# Patient Record
Sex: MALE | Race: BLACK OR AFRICAN AMERICAN | NOT HISPANIC OR LATINO | Employment: UNEMPLOYED
[De-identification: names, ages, dates, MRNs, and addresses within clinical notes are randomized per-mention and may not be internally consistent; named-entity substitution may affect disease eponyms.]

---
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
## 2023-10-17 ENCOUNTER — TELEPHONE (OUTPATIENT)
Dept: HEMATOLOGY/ONCOLOGY | Facility: HOSPITAL | Age: 23
End: 2023-10-17
Payer: MEDICAID

## 2023-10-17 NOTE — TELEPHONE ENCOUNTER
Message sent to team.   Contacted patient regarding breathing machine and that we are looking into a network that is able to provide a new one. Patient verbalized understanding. Provided call back number.

## 2023-10-17 NOTE — TELEPHONE ENCOUNTER
Patient insurance states that  is not in their network, they are attempting to come  his breathing machine. Patient would like to know what he should do.

## 2023-10-20 PROBLEM — H43.10 VITREOUS HEMORRHAGE (MULTI): Status: ACTIVE | Noted: 2023-10-20

## 2023-10-20 PROBLEM — R41.89 COGNITIVE IMPAIRMENT: Status: ACTIVE | Noted: 2023-10-20

## 2023-10-20 PROBLEM — R70.1 RETICULOCYTOSIS: Status: ACTIVE | Noted: 2023-10-20

## 2023-10-20 PROBLEM — L03.115 CELLULITIS OF RIGHT ANTERIOR LOWER LEG: Status: ACTIVE | Noted: 2022-08-15

## 2023-10-20 PROBLEM — R80.9 ALBUMINURIA: Status: ACTIVE | Noted: 2023-10-20

## 2023-10-20 PROBLEM — R48.8 DYSCALCULIA: Status: ACTIVE | Noted: 2023-10-20

## 2023-10-20 PROBLEM — H52.203 ASTIGMATISM OF BOTH EYES: Status: ACTIVE | Noted: 2023-10-20

## 2023-10-20 PROBLEM — K80.20 CALCULUS OF GALLBLADDER: Status: ACTIVE | Noted: 2021-05-26

## 2023-10-20 PROBLEM — R17 JAUNDICE: Status: ACTIVE | Noted: 2023-10-20

## 2023-10-20 PROBLEM — J98.4 SMALL AIRWAYS DISEASE: Status: ACTIVE | Noted: 2023-10-20

## 2023-10-20 PROBLEM — R06.02 SHORTNESS OF BREATH ON EXERTION: Status: ACTIVE | Noted: 2023-10-20

## 2023-10-20 PROBLEM — R40.0 DAYTIME SLEEPINESS: Status: ACTIVE | Noted: 2023-10-20

## 2023-10-20 PROBLEM — R29.818 SUSPECTED SLEEP APNEA: Status: ACTIVE | Noted: 2023-10-20

## 2023-10-20 PROBLEM — E83.19 IRON OVERLOAD: Status: ACTIVE | Noted: 2023-10-20

## 2023-10-20 PROBLEM — H52.13 MYOPIA, BILATERAL: Status: ACTIVE | Noted: 2023-10-20

## 2023-10-20 PROBLEM — E86.0 MODERATE DEHYDRATION: Status: ACTIVE | Noted: 2023-10-20

## 2023-10-20 PROBLEM — H36.89 SICKLE CELL RETINOPATHY (MULTI): Status: ACTIVE | Noted: 2023-10-20

## 2023-10-20 PROBLEM — R09.02 HYPOXIA: Status: ACTIVE | Noted: 2018-04-04

## 2023-10-20 PROBLEM — K80.50 BILIARY COLIC: Status: ACTIVE | Noted: 2021-05-26

## 2023-10-20 PROBLEM — S05.01XA ABRASION OF CORNEA, RIGHT: Status: ACTIVE | Noted: 2023-10-20

## 2023-10-20 PROBLEM — L03.119 CELLULITIS, LEG: Status: ACTIVE | Noted: 2023-10-20

## 2023-10-20 PROBLEM — D57.1 SICKLE CELL DISEASE (MULTI): Status: ACTIVE | Noted: 2021-05-26

## 2023-10-20 PROBLEM — D57.1 SICKLE CELL RETINOPATHY (MULTI): Status: ACTIVE | Noted: 2023-10-20

## 2023-10-20 RX ORDER — CALCIUM CARBONATE 200(500)MG
1 TABLET,CHEWABLE ORAL AS NEEDED
COMMUNITY

## 2023-10-20 RX ORDER — ALBUTEROL SULFATE 90 UG/1
1-2 AEROSOL, METERED RESPIRATORY (INHALATION)
COMMUNITY
Start: 2022-06-20 | End: 2023-10-23 | Stop reason: SDUPTHER

## 2023-10-20 RX ORDER — ERGOCALCIFEROL 1.25 MG/1
CAPSULE ORAL
COMMUNITY
Start: 2023-05-02 | End: 2023-12-27 | Stop reason: WASHOUT

## 2023-10-20 RX ORDER — OXYCODONE HYDROCHLORIDE 5 MG/1
1 TABLET ORAL EVERY 6 HOURS PRN
COMMUNITY
End: 2023-10-23 | Stop reason: ALTCHOICE

## 2023-10-20 RX ORDER — ACETAMINOPHEN 500 MG
1-2 TABLET ORAL DAILY PRN
COMMUNITY
Start: 2020-09-16

## 2023-10-20 RX ORDER — ACETAMINOPHEN 500 MG
TABLET ORAL
COMMUNITY
Start: 2022-11-10 | End: 2023-12-27 | Stop reason: WASHOUT

## 2023-10-20 RX ORDER — NAPROXEN 500 MG/1
TABLET ORAL
COMMUNITY

## 2023-10-23 ENCOUNTER — OFFICE VISIT (OUTPATIENT)
Dept: HEMATOLOGY/ONCOLOGY | Facility: HOSPITAL | Age: 23
End: 2023-10-23
Payer: MEDICAID

## 2023-10-23 ENCOUNTER — PHARMACY VISIT (OUTPATIENT)
Dept: PHARMACY | Facility: CLINIC | Age: 23
End: 2023-10-23
Payer: MEDICAID

## 2023-10-23 VITALS
BODY MASS INDEX: 24.06 KG/M2 | WEIGHT: 177.8 LBS | OXYGEN SATURATION: 92 % | TEMPERATURE: 97.3 F | SYSTOLIC BLOOD PRESSURE: 131 MMHG | HEART RATE: 88 BPM | DIASTOLIC BLOOD PRESSURE: 70 MMHG | RESPIRATION RATE: 16 BRPM

## 2023-10-23 DIAGNOSIS — J45.909 ASTHMA, UNSPECIFIED ASTHMA SEVERITY, UNSPECIFIED WHETHER COMPLICATED, UNSPECIFIED WHETHER PERSISTENT (HHS-HCC): ICD-10-CM

## 2023-10-23 DIAGNOSIS — D57.00 SICKLE CELL DISEASE WITH CRISIS (MULTI): Primary | ICD-10-CM

## 2023-10-23 PROCEDURE — 99214 OFFICE O/P EST MOD 30 MIN: CPT | Performed by: PEDIATRICS

## 2023-10-23 PROCEDURE — RXMED WILLOW AMBULATORY MEDICATION CHARGE

## 2023-10-23 RX ORDER — NALOXONE HYDROCHLORIDE 4 MG/.1ML
1 SPRAY NASAL AS NEEDED
Qty: 2 EACH | Refills: 1 | Status: SHIPPED | OUTPATIENT
Start: 2023-10-23 | End: 2024-10-22

## 2023-10-23 RX ORDER — FOLIC ACID 1 MG/1
TABLET ORAL
Qty: 90 TABLET | Refills: 2 | Status: SHIPPED | OUTPATIENT
Start: 2023-10-23 | End: 2024-01-24

## 2023-10-23 RX ORDER — AMOXICILLIN 250 MG/1
250 CAPSULE ORAL EVERY 12 HOURS
Qty: 60 CAPSULE | Refills: 11 | Status: SHIPPED | OUTPATIENT
Start: 2023-10-23 | End: 2024-03-19 | Stop reason: SDUPTHER

## 2023-10-23 RX ORDER — ALBUTEROL SULFATE 90 UG/1
1-2 AEROSOL, METERED RESPIRATORY (INHALATION) EVERY 6 HOURS PRN
Qty: 18 G | Refills: 1 | Status: SHIPPED | OUTPATIENT
Start: 2023-10-23

## 2023-10-23 RX ORDER — HYDROXYUREA 500 MG/1
2000 CAPSULE ORAL DAILY
Qty: 120 CAPSULE | Refills: 0 | Status: SHIPPED | OUTPATIENT
Start: 2023-10-23 | End: 2023-11-24 | Stop reason: SDUPTHER

## 2023-10-23 RX ORDER — FLUTICASONE PROPIONATE AND SALMETEROL 50; 250 UG/1; UG/1
1 POWDER RESPIRATORY (INHALATION) EVERY 12 HOURS
Qty: 60 EACH | Refills: 2 | Status: SHIPPED | OUTPATIENT
Start: 2023-10-23 | End: 2024-03-19 | Stop reason: SDUPTHER

## 2023-10-23 RX ORDER — OXYCODONE HYDROCHLORIDE 10 MG/1
10 TABLET ORAL EVERY 6 HOURS
Qty: 15 TABLET | Refills: 0 | Status: SHIPPED | OUTPATIENT
Start: 2023-10-23 | End: 2023-11-07

## 2023-10-23 ASSESSMENT — PAIN SCALES - GENERAL: PAINLEVEL: 0-NO PAIN

## 2023-10-24 ASSESSMENT — ENCOUNTER SYMPTOMS
ABDOMINAL PAIN: 0
DYSURIA: 0
WOUND: 0
NAUSEA: 0
FLANK PAIN: 0
CONFUSION: 0
ARTHRALGIAS: 0
CONSTIPATION: 0
HEMOPTYSIS: 0
BACK PAIN: 0
UNEXPECTED WEIGHT CHANGE: 0
LEG SWELLING: 0
EYE PROBLEMS: 0
FEVER: 0
SLEEP DISTURBANCE: 0
SORE THROAT: 0
NUMBNESS: 0
FATIGUE: 0
HEMATOLOGIC/LYMPHATIC NEGATIVE: 1
APPETITE CHANGE: 0
MYALGIAS: 0
DEPRESSION: 0
CHILLS: 0
SCLERAL ICTERUS: 0
FREQUENCY: 0
PALPITATIONS: 0
BLOOD IN STOOL: 0
CHEST TIGHTNESS: 0
DIARRHEA: 0
EXTREMITY WEAKNESS: 0
HEADACHES: 0
COUGH: 0
VOMITING: 0
LIGHT-HEADEDNESS: 0
NERVOUS/ANXIOUS: 0
WHEEZING: 0

## 2023-10-24 NOTE — PROGRESS NOTES
SICKLE CELL OUTPATIENT NOTE  Patient ID: Yakov Jimenez   Visit Type: Follow up visit       ASSESSMENT AND PLAN  Yakov Jimenez is 23 y.o. male with     History of Hb SS Sickle cell disease in steady state and without acute sickle cell complications including pain. Pain regimen will be as follows  Oral Tylenol and or ibuprofen/naprosyn as needed for mild to moderate pain   Oral oxycodone 10 mg every 4-6 hours prn and patient was given a new prescription   Discussed non pharmacologic methods of pain control   Reviewed OARRS  Narcan available at all times and reviewed indications    Encounter for management of disease modifying therapy. Yakov Jimenez is currently on Hydroxyurea and continues to tolerate this without any major side effects    Continue hydroxyurea 2000 mg daily   Follow up on CBC and CMP for toxicity monitoring     Chronic anemia secondary to SCD    Daily folic acid     Nocturnal hypoxia on supplemental night time oxygen. Current oxygen supplier is out of his insurance network. 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 He will  need a CBC and CMP         Chief Complaint: Follow up for HB SS SCD      Interval History: Yakov is present, in clinic today for follow-up of hemoglobin SS sickle cell disease.  His grandfather Anderson, was on the phone during the encounter. He has been doing well since his last office visit encounter, and is without any complaints in clinic today. He remains on oral hydroxyurea, 2 g daily and has been compliant with this. Prn tylenol ibuprofen and oxycodone does control his sickle cell pain. He denies  hospitalizations, ED visits or [facility] visits for acute sickle cell crises. He had his nocturnal oximetry testing done but current oxygen supplier is out of network for him. Reactive airway disease has been well controlled. He is without pain, fever, chest pain, chest tightness, shortness of breath, nausea, or vomiting in clinic today. He denies focal neurologic deficits or priapism. He has not had any changes or worsening of his vision.     Review of System:   Review of Systems   Constitutional:  Negative for appetite change, chills, fatigue, fever and unexpected weight change.   HENT:   Negative for nosebleeds and sore throat.    Eyes:  Negative for eye problems and icterus.   Respiratory:  Negative for chest tightness, cough, hemoptysis and wheezing.    Cardiovascular:  Negative for chest pain, leg swelling and palpitations.   Gastrointestinal:  Negative for abdominal pain, blood in stool, constipation, diarrhea, nausea and vomiting.   Genitourinary:  Negative for dysuria, frequency and nocturia.    Musculoskeletal:  Negative for arthralgias, back pain, flank pain, gait problem and myalgias.   Skin:  Negative for rash and wound.   Neurological:  Negative for extremity weakness, gait problem, headaches, light-headedness and numbness.   Hematological: Negative.    Psychiatric/Behavioral:  Negative for confusion, depression and sleep disturbance. The patient is not nervous/anxious.         Allergies:   Allergies   Allergen Reactions    Ceftriaxone Anaphylaxis       Current Medications:   Outpatient Encounter Medications as of 10/23/2023   Medication Sig    acetaminophen (Tylenol) 500 mg tablet Take 1-2 tablets (500-1,000 mg) by mouth once daily as needed.    naproxen (Naprosyn) 500 mg tablet Take by mouth.  May take one Naproxen with one tylenol for pain if tylenol alone doesn't work for mild to moderate sickle cell pain    Advair Diskus 250-50 mcg/dose diskus inhaler Inhale 1 puff every 12 hours.    albuterol 90  mcg/actuation inhaler Inhale 1-2 puffs  EVERY 4 TO 6 HOURS if needed for wheezing..    amoxicillin (Amoxil) 250 mg capsule TAKE 1 CAPSULE BY MOUTH EVERY 12 HOURS    calcium carbonate (Tums) 200 mg calcium chewable tablet Chew 1 tablet (500 mg) if needed.    ergocalciferol (Vitamin D-2) 1.25 MG (18404 UT) capsule     folic acid (Folvite) 1 mg tablet TAKE 1 TABLLET BY MOUTH ONCE A DAY    hydroxyurea (Hydrea) 500 mg capsule TAKE 4 CAPSULES BY MOUTH ONCE DAILY    melatonin 5 mg tablet     naloxone (Narcan) 4 mg/0.1 mL nasal spray INSTILL 1 SPRAY IN ONE NOSTRIL AS NEEDED FOR ACCIDENTAL OPIOID OVERDOSE; REPEAT WITH SECOND DOSE IN 5 MINUTES IF NO RESPONSE.    oxyCODONE (Roxicodone) 10 mg immediate release tablet TAKE 1 TABLET BY MOUTH EVERY 6 HOURS     Past Medical History:    Hb SS SCD    Vitreous hemorrhage right eye, March 2019. s/p laser and close f/u via optho. Last opthal   Chronic fatigue      Past Surgical History:   She has a past surgical history that includes Other surgical history (07/14/2022) and MR angio head wo IV contrast (7/26/2019). Lives with Grandparents and uncle. 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 Mother passed away from complications of SCD and her uncle also has  SCD        Family History:   Family History   Problem Relation Name Age of Onset    Asthma Mother         Social History:   Yakov Jimenez  reports that he has never smoked. He has never used smokeless tobacco.  has no history on file for drug use.    EXAMINATION FINDINGS   /70 (BP Location: Left arm, Patient Position: Sitting)   Pulse 88   Temp 36.3 °C (97.3 °F) (Core)   Resp 16   Wt 80.6 kg (177 lb 12.8 oz)   SpO2 92%   BMI 24.06 kg/m²   2.02 meters squared  Physical Exam  Vitals and nursing note reviewed.   Constitutional:       General: He is not in acute distress.     Appearance: Normal appearance. He is not ill-appearing.   HENT:      Nose: Nose normal.      Mouth/Throat:      Mouth: Mucous  membranes are moist.      Pharynx: Oropharynx is clear. No oropharyngeal exudate or posterior oropharyngeal erythema.   Eyes:      General: No scleral icterus.        Right eye: No discharge.         Left eye: No discharge.      Extraocular Movements: Extraocular movements intact.      Pupils: Pupils are equal, round, and reactive to light.   Cardiovascular:      Rate and Rhythm: Normal rate and regular rhythm.      Pulses: Normal pulses.      Heart sounds: Normal heart sounds, S1 normal and S2 normal. No murmur heard.  Pulmonary:      Effort: Pulmonary effort is normal. No respiratory distress.      Breath sounds: Normal breath sounds. No wheezing or rales.   Abdominal:      General: Abdomen is flat. There is no distension.      Palpations: Abdomen is soft. There is no hepatomegaly or splenomegaly.      Tenderness: There is no abdominal tenderness. There is no guarding.   Musculoskeletal:         General: No swelling or deformity. Normal range of motion.      Cervical back: Normal range of motion and neck supple.      Right lower leg: No edema.      Left lower leg: No edema.   Skin:     General: Skin is warm.      Capillary Refill: Capillary refill takes less than 2 seconds.      Coloration: Skin is not jaundiced.      Findings: No bruising or lesion.   Neurological:      General: No focal deficit present.      Mental Status: He is alert and oriented to person, place, and time.      Cranial Nerves: No cranial nerve deficit.      Motor: No weakness.      Gait: Gait normal.   Psychiatric:         Mood and Affect: Mood normal.         Behavior: Behavior normal.       LABS   [facility] Encounter on 08/21/2023   Component Date Value Ref Range Status    Hgb Identification Path Review 08/21/2023 JADA   Final    Comment:  By her/his signature above, the Pathologist   listed as making the final interpretation   certifies that she/he has personally reviewed    this case.  ----------------------------------------------        Glucose 08/21/2023 83  74 - 99 mg/dL Final    Sodium 08/21/2023 136  136 - 145 mmol/L Final    Potassium 08/21/2023 4.4  3.5 - 5.3 mmol/L Final    Chloride 08/21/2023 104  98 - 107 mmol/L Final    Bicarbonate 08/21/2023 27  21 - 32 mmol/L Final    Anion Gap 08/21/2023 9 (L)  10 - 20 mmol/L Final    Urea Nitrogen 08/21/2023 11  6 - 23 mg/dL Final    Creatinine 08/21/2023 0.69  0.50 - 1.30 mg/dL Final    GFR MALE 08/21/2023 >90  >90 mL/min/1.73m2 Final    Comment:  CALCULATIONS OF ESTIMATED GFR ARE PERFORMED   USING THE 2021 CKD-EPI STUDY REFIT EQUATION   WITHOUT THE RACE VARIABLE FOR THE IDMS-TRACEABLE   CREATININE METHODS.    https://jasn.asnjournals.org/content/early/2021/09/22/ASN.2564358829      Calcium 08/21/2023 9.4  8.6 - 10.3 mg/dL Final    Albumin 08/21/2023 4.3  3.4 - 5.0 g/dL Final    Alkaline Phosphatase 08/21/2023 98  33 - 120 U/L Final    Total Protein 08/21/2023 7.8  6.4 - 8.2 g/dL Final    AST 08/21/2023 31  9 - 39 U/L Final    Total Bilirubin 08/21/2023 2.9 (H)  0.0 - 1.2 mg/dL Final    ALT (SGPT) 08/21/2023 27  10 - 52 U/L Final    Comment:  Patients treated with Sulfasalazine may generate    falsely decreased results for ALT.      BNP 08/21/2023 22  0 - 99 pg/mL Final    Comment: .  <100 pg/mL - Heart failure unlikely  100-299 pg/mL - Intermediate probability of acute heart  .               failure exacerbation. Correlate with clinical  .               context and patient history.    >=300 pg/mL - Heart Failure likely. Correlate with clinical  .               context and patient history.   Biotin interference may cause falsely decreased results.   Patients taking a Biotin dose of up to 5 mg/day should   refrain from taking Biotin for 24 hours before sample   collection. Providers may contact their local laboratory   for further information.      Retic % 08/21/2023 11.5 (H)  0.5 - 2.0 % Final    Retic Absolute 08/21/2023 0.265 (H)  0.022 - 0.118 x10E12/L Final    Immature Retic fraction  08/21/2023 30.9 (H)  0.0 - 16.0 % Final    Reticulocyte Hemoglobin 08/21/2023 42 (H)  28 - 38 pg Final    LD 08/21/2023 341 (H)  84 - 246 U/L Final    WBC 08/21/2023 5.2  4.4 - 11.3 x10E9/L Final    nRBC 08/21/2023 41.5  0.0 - 0.0 /100 WBC Final    RBC 08/21/2023 2.30 (L)  4.50 - 5.90 x10E12/L Final    Hemoglobin 08/21/2023 10.1 (L)  13.5 - 17.5 g/dL Final    Hematocrit 08/21/2023 26.7 (L)  41.0 - 52.0 % Final    MCV 08/21/2023 116 (H)  80 - 100 fL Final    MCHC 08/21/2023 37.8 (H)  32.0 - 36.0 g/dL Final    Platelets 08/21/2023 310  150 - 450 x10E9/L Final    RDW 08/21/2023 24.8 (H)  11.5 - 14.5 % Final    Neutrophils % 08/21/2023 42.2  40.0 - 80.0 % Final    Immature Granulocytes %, Automated 08/21/2023 0.2  0.0 - 0.9 % Final    Comment:  Immature Granulocyte Count (IG) includes promyelocytes,    myelocytes and metamyelocytes but does not include bands.   Percent differential counts (%) should be interpreted in the   context of the absolute cell counts (cells/L).      Lymphocytes % 08/21/2023 47.7  13.0 - 44.0 % Final    Monocytes % 08/21/2023 6.0  2.0 - 10.0 % Final    Eosinophils % 08/21/2023 2.7  0.0 - 6.0 % Final    Basophils % 08/21/2023 1.2  0.0 - 2.0 % Final    Neutrophils Absolute 08/21/2023 2.20  1.20 - 7.70 x10E9/L Final    Lymphocytes Absolute 08/21/2023 2.48  1.20 - 4.80 x10E9/L Final    Monocytes Absolute 08/21/2023 0.31  0.10 - 1.00 x10E9/L Final    Eosinophils Absolute 08/21/2023 0.14  0.00 - 0.70 x10E9/L Final    Basophils Absolute 08/21/2023 0.06  0.00 - 0.10 x10E9/L Final    Automated WBC differential has been confirmed by manual smear.    Hemoglobin F 08/21/2023 7.7  % Final    Hemoglobin S 08/21/2023 88.1  % Final    Hemoglobin A2 08/21/2023 4.2  % Final     HGB A2 values may be falsely elevated  in the presence of HGB S.    Hemoglobin Identification Interpre* 08/21/2023 SEE COMMENT   Final    Comment: Known Sickle Cell Anemia (Hb SS)  Increased hemoglobin F with macrocytosis is most likely a  result of treatment   effect. Clinical correlation suggested.      ALBUMIN (MG/L) IN URINE 08/21/2023 12.7  Not Established mg/L Final    Albumin/Creatine Ratio 08/21/2023 15.0  0.0 - 30.0 ug/mg crt Final    Creatinine, Urine 08/21/2023 84.7  20.0 - 370.0 mg/dL Final    RBC Morphology 08/21/2023 See Below   Final    Polychromasia 08/21/2023 Mild   Final    RBC Fragments 08/21/2023 Few   Final    Sickle Cells 08/21/2023 Many   Final    Target Cells 08/21/2023 Few   Final    Pappenheimer Bodies 08/21/2023 Present   Final                 Jose Guadalupe Nelson MD

## 2023-11-02 ENCOUNTER — PHARMACY VISIT (OUTPATIENT)
Dept: PHARMACY | Facility: CLINIC | Age: 23
End: 2023-11-02
Payer: MEDICAID

## 2023-11-02 PROCEDURE — RXMED WILLOW AMBULATORY MEDICATION CHARGE

## 2023-11-09 ENCOUNTER — APPOINTMENT (OUTPATIENT)
Dept: SLEEP MEDICINE | Facility: CLINIC | Age: 23
End: 2023-11-09
Payer: MEDICAID

## 2023-11-17 ENCOUNTER — CLINICAL SUPPORT (OUTPATIENT)
Dept: SLEEP MEDICINE | Facility: CLINIC | Age: 23
End: 2023-11-17
Payer: MEDICAID

## 2023-11-17 VITALS
HEIGHT: 72 IN | SYSTOLIC BLOOD PRESSURE: 131 MMHG | DIASTOLIC BLOOD PRESSURE: 70 MMHG | WEIGHT: 177.69 LBS | BODY MASS INDEX: 24.07 KG/M2

## 2023-11-17 DIAGNOSIS — G47.9 SLEEP DISORDER, UNSPECIFIED: ICD-10-CM

## 2023-11-17 DIAGNOSIS — R40.0 DAYTIME SLEEPINESS: ICD-10-CM

## 2023-11-17 DIAGNOSIS — R29.818 SUSPECTED SLEEP APNEA: ICD-10-CM

## 2023-11-17 PROCEDURE — 95810 POLYSOM 6/> YRS 4/> PARAM: CPT | Performed by: INTERNAL MEDICINE

## 2023-11-18 ASSESSMENT — SLEEP AND FATIGUE QUESTIONNAIRES
HOW LIKELY ARE YOU TO NOD OFF OR FALL ASLEEP WHILE WATCHING TV: WOULD NEVER DOZE
SITING INACTIVE IN A PUBLIC PLACE LIKE A CLASS ROOM OR A MOVIE THEATER: WOULD NEVER DOZE
HOW LIKELY ARE YOU TO NOD OFF OR FALL ASLEEP WHILE SITTING AND TALKING TO SOMEONE: WOULD NEVER DOZE
HOW LIKELY ARE YOU TO NOD OFF OR FALL ASLEEP IN A CAR, WHILE STOPPED FOR A FEW MINUTES IN TRAFFIC: WOULD NEVER DOZE
HOW LIKELY ARE YOU TO NOD OFF OR FALL ASLEEP WHILE SITTING AND READING: WOULD NEVER DOZE
ESS-CHAD TOTAL SCORE: 5
HOW LIKELY ARE YOU TO NOD OFF OR FALL ASLEEP WHEN YOU ARE A PASSENGER IN A CAR FOR AN HOUR WITHOUT A BREAK: HIGH CHANCE OF DOZING
HOW LIKELY ARE YOU TO NOD OFF OR FALL ASLEEP WHILE LYING DOWN TO REST IN THE AFTERNOON WHEN CIRCUMSTANCES PERMIT: MODERATE CHANCE OF DOZING
HOW LIKELY ARE YOU TO NOD OFF OR FALL ASLEEP WHILE SITTING QUIETLY AFTER LUNCH WITHOUT ALCOHOL: WOULD NEVER DOZE

## 2023-11-18 NOTE — PROGRESS NOTES
[facility] TECH NOTE:     Patient: Yakov Jimenez   MRN//AGE: [id number]  2000  23 y.o.   Technologist: Joseph Olivas   Room: 3   Service Date: 2023        Sleep Testing Location: [facility]  Neck Ciur 37cm  Vail: 5    TECHNOLOGIST SLEEP STUDY PROCEDURE NOTE:   This sleep study is being conducted according to the policies and procedures outlined by the AAS accreditation standards.  The sleep study procedure and processes involved during this appointment was explained to the patient/patient’s family, questions were answered. The patient/family verbalized understanding.      The patient is a 23 y.o. year old male scheduled for aDiagnostic PSG Split night with montage of:  adult PSG with end tidal CO2 . he arrived for his appointment.      The study that was ultimately completed was aDiagnostic PSG Split night with montage of:  adult PSG with end tidal CO2 .    The full study Was completed.  Patient questionnaires completed?: yes     Consents signed? yes    Initial Fall Risk Screening:     Yakov has not fallen in the last 6 months. his did not result in injury. Yakov does not have a fear of falling. He does not need assistance with sitting, standing, or walking. he does not need assistance walking in his home. he does not need assistance in an unfamiliar setting. The patient is notusing an assistive device.     Brief Study observations: This is a 23 year-old male here for a PSG-Split Night Study     Deviation to order/protocol and reason: none      If PAP, which was preferred mask/pressure/mode: none      Other:None    After the procedure, the patient/family was informed to ensure followup with ordering clinician for testing results.      Technologist: Joseph Olivas

## 2023-11-24 ENCOUNTER — PHARMACY VISIT (OUTPATIENT)
Dept: PHARMACY | Facility: CLINIC | Age: 23
End: 2023-11-24
Payer: MEDICAID

## 2023-11-24 DIAGNOSIS — D57.00 SICKLE CELL DISEASE WITH CRISIS (MULTI): ICD-10-CM

## 2023-11-24 PROCEDURE — RXMED WILLOW AMBULATORY MEDICATION CHARGE

## 2023-11-27 RX ORDER — HYDROXYUREA 500 MG/1
2000 CAPSULE ORAL DAILY
Qty: 120 CAPSULE | Refills: 0 | Status: ON HOLD | OUTPATIENT
Start: 2023-11-27 | End: 2024-01-03

## 2023-11-28 ENCOUNTER — PHARMACY VISIT (OUTPATIENT)
Dept: PHARMACY | Facility: CLINIC | Age: 23
End: 2023-11-28
Payer: MEDICAID

## 2023-11-28 PROCEDURE — RXMED WILLOW AMBULATORY MEDICATION CHARGE

## 2023-11-30 ENCOUNTER — TELEPHONE (OUTPATIENT)
Dept: HEMATOLOGY/ONCOLOGY | Facility: HOSPITAL | Age: 23
End: 2023-11-30
Payer: MEDICAID

## 2023-11-30 ENCOUNTER — HOSPITAL ENCOUNTER (EMERGENCY)
Facility: HOSPITAL | Age: 23
Discharge: HOME | End: 2023-11-30
Attending: EMERGENCY MEDICINE
Payer: MEDICAID

## 2023-11-30 VITALS
DIASTOLIC BLOOD PRESSURE: 73 MMHG | TEMPERATURE: 98.5 F | OXYGEN SATURATION: 100 % | HEART RATE: 78 BPM | RESPIRATION RATE: 16 BRPM | SYSTOLIC BLOOD PRESSURE: 118 MMHG

## 2023-11-30 DIAGNOSIS — R42 DIZZINESS: Primary | ICD-10-CM

## 2023-11-30 LAB
ALBUMIN SERPL BCP-MCNC: 4.4 G/DL (ref 3.4–5)
ALP SERPL-CCNC: 87 U/L (ref 33–120)
ALT SERPL W P-5'-P-CCNC: 23 U/L (ref 10–52)
ANION GAP SERPL CALC-SCNC: 11 MMOL/L (ref 10–20)
AST SERPL W P-5'-P-CCNC: 24 U/L (ref 9–39)
BASOPHILS # BLD AUTO: 0.05 X10*3/UL (ref 0–0.1)
BASOPHILS NFR BLD AUTO: 0.9 %
BILIRUB SERPL-MCNC: 2.4 MG/DL (ref 0–1.2)
BUN SERPL-MCNC: 8 MG/DL (ref 6–23)
CALCIUM SERPL-MCNC: 9.1 MG/DL (ref 8.6–10.6)
CHLORIDE SERPL-SCNC: 104 MMOL/L (ref 98–107)
CO2 SERPL-SCNC: 27 MMOL/L (ref 21–32)
CREAT SERPL-MCNC: 0.69 MG/DL (ref 0.5–1.3)
EOSINOPHIL # BLD AUTO: 0.1 X10*3/UL (ref 0–0.7)
EOSINOPHIL NFR BLD AUTO: 1.8 %
ERYTHROCYTE [DISTWIDTH] IN BLOOD BY AUTOMATED COUNT: 19.1 % (ref 11.5–14.5)
GFR SERPL CREATININE-BSD FRML MDRD: >90 ML/MIN/1.73M*2
GLUCOSE SERPL-MCNC: 86 MG/DL (ref 74–99)
HCT VFR BLD AUTO: 26.1 % (ref 41–52)
HGB BLD-MCNC: 9.4 G/DL (ref 13.5–17.5)
HGB RETIC QN: 41 PG (ref 28–38)
IMM GRANULOCYTES # BLD AUTO: 0.02 X10*3/UL (ref 0–0.7)
IMM GRANULOCYTES NFR BLD AUTO: 0.4 % (ref 0–0.9)
IMMATURE RETIC FRACTION: 34.1 %
LDH SERPL L TO P-CCNC: 260 U/L (ref 84–246)
LYMPHOCYTES # BLD AUTO: 3.54 X10*3/UL (ref 1.2–4.8)
LYMPHOCYTES NFR BLD AUTO: 62.4 %
MCH RBC QN AUTO: 42.9 PG (ref 26–34)
MCHC RBC AUTO-ENTMCNC: 36 G/DL (ref 32–36)
MCV RBC AUTO: 119 FL (ref 80–100)
MONOCYTES # BLD AUTO: 0.43 X10*3/UL (ref 0.1–1)
MONOCYTES NFR BLD AUTO: 7.6 %
NEUTROPHILS # BLD AUTO: 1.53 X10*3/UL (ref 1.2–7.7)
NEUTROPHILS NFR BLD AUTO: 26.9 %
NRBC BLD-RTO: 21.3 /100 WBCS (ref 0–0)
PLATELET # BLD AUTO: 239 X10*3/UL (ref 150–450)
POLYCHROMASIA BLD QL SMEAR: NORMAL
POTASSIUM SERPL-SCNC: 4.2 MMOL/L (ref 3.5–5.3)
PROT SERPL-MCNC: 7.2 G/DL (ref 6.4–8.2)
RBC # BLD AUTO: 2.19 X10*6/UL (ref 4.5–5.9)
RBC MORPH BLD: NORMAL
RETICS #: 0.25 X10*6/UL (ref 0.02–0.12)
RETICS/RBC NFR AUTO: 11.2 % (ref 0.5–2)
SICKLE CELLS BLD QL SMEAR: NORMAL
SODIUM SERPL-SCNC: 138 MMOL/L (ref 136–145)
STOMATOCYTES BLD QL SMEAR: NORMAL
TARGETS BLD QL SMEAR: NORMAL
WBC # BLD AUTO: 5.7 X10*3/UL (ref 4.4–11.3)

## 2023-11-30 PROCEDURE — 2500000004 HC RX 250 GENERAL PHARMACY W/ HCPCS (ALT 636 FOR OP/ED): Mod: SE | Performed by: STUDENT IN AN ORGANIZED HEALTH CARE EDUCATION/TRAINING PROGRAM

## 2023-11-30 PROCEDURE — 93010 ELECTROCARDIOGRAM REPORT: CPT | Performed by: EMERGENCY MEDICINE

## 2023-11-30 PROCEDURE — 80053 COMPREHEN METABOLIC PANEL: CPT | Performed by: EMERGENCY MEDICINE

## 2023-11-30 PROCEDURE — 36415 COLL VENOUS BLD VENIPUNCTURE: CPT | Performed by: EMERGENCY MEDICINE

## 2023-11-30 PROCEDURE — 85025 COMPLETE CBC W/AUTO DIFF WBC: CPT | Performed by: EMERGENCY MEDICINE

## 2023-11-30 PROCEDURE — 99283 EMERGENCY DEPT VISIT LOW MDM: CPT | Mod: 25

## 2023-11-30 PROCEDURE — 99284 EMERGENCY DEPT VISIT MOD MDM: CPT | Performed by: EMERGENCY MEDICINE

## 2023-11-30 PROCEDURE — 93005 ELECTROCARDIOGRAM TRACING: CPT

## 2023-11-30 PROCEDURE — 83615 LACTATE (LD) (LDH) ENZYME: CPT | Performed by: EMERGENCY MEDICINE

## 2023-11-30 PROCEDURE — 96361 HYDRATE IV INFUSION ADD-ON: CPT

## 2023-11-30 PROCEDURE — 85045 AUTOMATED RETICULOCYTE COUNT: CPT | Performed by: EMERGENCY MEDICINE

## 2023-11-30 PROCEDURE — 96360 HYDRATION IV INFUSION INIT: CPT

## 2023-11-30 PROCEDURE — 2500000001 HC RX 250 WO HCPCS SELF ADMINISTERED DRUGS (ALT 637 FOR MEDICARE OP): Mod: SE | Performed by: STUDENT IN AN ORGANIZED HEALTH CARE EDUCATION/TRAINING PROGRAM

## 2023-11-30 RX ORDER — MECLIZINE HCL 12.5 MG 12.5 MG/1
25 TABLET ORAL ONCE
Status: COMPLETED | OUTPATIENT
Start: 2023-11-30 | End: 2023-11-30

## 2023-11-30 RX ORDER — MECLIZINE HYDROCHLORIDE 25 MG/1
25 TABLET ORAL 3 TIMES DAILY PRN
Qty: 10 TABLET | Refills: 0 | Status: SHIPPED | OUTPATIENT
Start: 2023-11-30 | End: 2024-01-03 | Stop reason: HOSPADM

## 2023-11-30 RX ORDER — HYDROGEN PEROXIDE 3 %
SOLUTION, NON-ORAL MISCELLANEOUS
Status: DISCONTINUED
Start: 2023-11-30 | End: 2023-11-30 | Stop reason: HOSPADM

## 2023-11-30 RX ADMIN — MECLIZINE 25 MG: 12.5 TABLET ORAL at 19:30

## 2023-11-30 RX ADMIN — SODIUM CHLORIDE, POTASSIUM CHLORIDE, SODIUM LACTATE AND CALCIUM CHLORIDE 1000 ML: 600; 310; 30; 20 INJECTION, SOLUTION INTRAVENOUS at 19:30

## 2023-11-30 ASSESSMENT — COLUMBIA-SUICIDE SEVERITY RATING SCALE - C-SSRS
2. HAVE YOU ACTUALLY HAD ANY THOUGHTS OF KILLING YOURSELF?: NO
1. IN THE PAST MONTH, HAVE YOU WISHED YOU WERE DEAD OR WISHED YOU COULD GO TO SLEEP AND NOT WAKE UP?: NO
6. HAVE YOU EVER DONE ANYTHING, STARTED TO DO ANYTHING, OR PREPARED TO DO ANYTHING TO END YOUR LIFE?: NO

## 2023-11-30 NOTE — ED PROVIDER NOTES
HPI   Chief Complaint   Patient presents with    Dizziness       Patient is a 23-year-old male with past medical history of sickle cell disease here with 1 day of positional vertigo.  Denies fevers, chills, chest pain, shortness of breath, vision changes, neck pain or stiffness, headaches, focal weakness, numbness, paresthesias.  Denies any symptoms ever had before, denies preceding URI, denies tinnitus or hearing changes.  Has not taken any medication for symptoms.  Denies any vomiting.       used: No                        No data recorded                Patient History   No past medical history on file.  Past Surgical History:   Procedure Laterality Date    MR HEAD ANGIO WO IV CONTRAST  7/26/2019    MR HEAD ANGIO WO IV CONTRAST 7/26/2019 [facility] ANCILLARY LEGACY    OTHER SURGICAL HISTORY  07/14/2022    Eye surgery     Family History   Problem Relation Name Age of Onset    Asthma Mother       Social History     Tobacco Use    Smoking status: Never    Smokeless tobacco: Never   Substance Use Topics    Alcohol use: Not on file    Drug use: Not on file       Physical Exam   ED Triage Vitals [11/30/23 1453]   Temp Heart Rate Resp BP   36.9 °C (98.4 °F) 83 16 123/61      SpO2 Temp Source Heart Rate Source Patient Position   98 % Temporal -- --      BP Location FiO2 (%)     -- --       Physical Exam  Constitutional:       Appearance: Normal appearance.   HENT:      Head: Normocephalic and atraumatic.      Right Ear: External ear normal.      Left Ear: External ear normal.      Nose: No congestion or rhinorrhea.      Mouth/Throat:      Mouth: Mucous membranes are moist.      Pharynx: Oropharynx is clear.   Eyes:      Extraocular Movements: Extraocular movements intact.      Conjunctiva/sclera: Conjunctivae normal.      Pupils: Pupils are equal, round, and reactive to light.      Comments: No nystagmus   Cardiovascular:      Rate and Rhythm: Normal rate and regular rhythm.      Pulses: Normal pulses.       Heart sounds: Normal heart sounds.   Pulmonary:      Effort: Pulmonary effort is normal.      Breath sounds: Normal breath sounds.   Abdominal:      Palpations: Abdomen is soft.      Tenderness: There is no abdominal tenderness.   Musculoskeletal:         General: Normal range of motion.      Cervical back: Normal range of motion and neck supple. No rigidity.   Skin:     General: Skin is warm and dry.      Capillary Refill: Capillary refill takes less than 2 seconds.   Neurological:      General: No focal deficit present.      Mental Status: He is alert and oriented to person, place, and time. Mental status is at baseline.      Comments: 5 out of 5 strength bilateral upper and lower extremities, sensation intact, negative Romberg, gait intact without ataxia, negative finger-nose and heel-to-shin, negative dysdiadochokinesia    Psychiatric:         Mood and Affect: Mood normal.         Behavior: Behavior normal.         ED Course & [facility]   ED Course as of 11/30/23 2007   Thu Nov 30, 2023   1902 ECG 12 lead  EKG interpreted by me.  11/30/2023 at 1503.  Sinus rhythm with sinus arrhythmia at 78 bpm.   QRS 90 QTc 408.  No ST elevation.  LVH.  No significant change when compared to previous EKG from 3/2/2022. [MC]      ED Course User Index  [MC] David Dutton MD       Medical Decision Making  23-year-old male past medical history of sickle cell disease here with 1 day of positional vertigo.  Patient presents hemodynamically stable, no acute distress, mentating appropriately, afebrile and saturating well on room air.  Physical exam notable for an overall well-appearing male, he has a nonfocal neurologic exam with negative cerebellar testing, does not have nystagmus so cannot perform a hints exam, but given completely benign exam, as well as positional nature of vertigo I suspect a peripheral cause, very low concern for a posterior CVA or dural venous thrombosis.  No signs of meningitis.  Given his lack of  cardiopulmonary or infectious symptoms, very low concern for acute chest syndrome, EKG nonischemic without any concerns for arrhythmia, no chest x-ray indicated.  Labs are largely at his baseline with findings of vaso-occlusive crisis with appropriate bone marrow response.  Patient given meclizine, fluids with improvement in symptoms, discharged with neurology follow-up.    Amount and/or Complexity of Data Reviewed  External Data Reviewed: notes.  Labs: ordered.  ECG/medicine tests: ordered and independent interpretation performed.     Details: Normal sinus rhythm, no ST changes, normal axis    Risk  Prescription drug management.        Procedure  Procedures     Danie Medina MD  Resident  11/30/23 2019

## 2023-11-30 NOTE — TELEPHONE ENCOUNTER
"Received message that Yakov would like to be seen in ACC. When I spoke with him he reported having symptoms of 'lightheadedness and motion sickness\" and feels this is exacerbating his fatigue. He has also had HA. Pt denies chest pain, cough, SOB, blurry vision, falls, fever or chills, v/d/abd pain, or urinary complaints.  Per chart review he has not had labs drawn since 8/23. Given symptoms advised him to go to ER.   "

## 2023-12-01 ENCOUNTER — PHARMACY VISIT (OUTPATIENT)
Dept: PHARMACY | Facility: CLINIC | Age: 23
End: 2023-12-01
Payer: MEDICAID

## 2023-12-01 PROCEDURE — RXMED WILLOW AMBULATORY MEDICATION CHARGE

## 2023-12-01 NOTE — DISCHARGE INSTRUCTIONS
May take meclizine up to 3 times per day as needed for dizziness. Please follow up with your hematologist. Return to emergency department for reassessment if new or worsening symptoms.

## 2023-12-02 ENCOUNTER — ANCILLARY PROCEDURE (OUTPATIENT)
Dept: EMERGENCY MEDICINE | Facility: HOSPITAL | Age: 23
End: 2023-12-02
Payer: MEDICAID

## 2023-12-02 LAB
ATRIAL RATE: 78 BPM
P AXIS: 52 DEGREES
P OFFSET: 199 MS
P ONSET: 152 MS
PR INTERVAL: 132 MS
Q ONSET: 218 MS
QRS COUNT: 12 BEATS
QRS DURATION: 90 MS
QT INTERVAL: 358 MS
QTC CALCULATION(BAZETT): 408 MS
QTC FREDERICIA: 390 MS
R AXIS: 78 DEGREES
T AXIS: 50 DEGREES
T OFFSET: 397 MS
VENTRICULAR RATE: 78 BPM

## 2023-12-03 ENCOUNTER — PHARMACY VISIT (OUTPATIENT)
Dept: PHARMACY | Facility: CLINIC | Age: 23
End: 2023-12-03
Payer: MEDICAID

## 2023-12-03 PROCEDURE — RXMED WILLOW AMBULATORY MEDICATION CHARGE

## 2023-12-06 ENCOUNTER — TELEPHONE (OUTPATIENT)
Dept: HEMATOLOGY/ONCOLOGY | Facility: HOSPITAL | Age: 23
End: 2023-12-06
Payer: MEDICAID

## 2023-12-06 DIAGNOSIS — D57.00 SICKLE CELL DISEASE WITH CRISIS (MULTI): Primary | ICD-10-CM

## 2023-12-06 NOTE — TELEPHONE ENCOUNTER
Refill request received for Oxycodone.  Preferred pharmacy is [facility].  Message sent to Sickle Cell team.

## 2023-12-08 ENCOUNTER — PHARMACY VISIT (OUTPATIENT)
Dept: PHARMACY | Facility: CLINIC | Age: 23
End: 2023-12-08
Payer: MEDICAID

## 2023-12-08 PROCEDURE — RXMED WILLOW AMBULATORY MEDICATION CHARGE

## 2023-12-08 RX ORDER — OXYCODONE HYDROCHLORIDE 10 MG/1
10 TABLET ORAL EVERY 6 HOURS PRN
Qty: 15 TABLET | Refills: 0 | Status: SHIPPED | OUTPATIENT
Start: 2023-12-08 | End: 2023-12-15

## 2023-12-15 ENCOUNTER — PHARMACY VISIT (OUTPATIENT)
Dept: PHARMACY | Facility: CLINIC | Age: 23
End: 2023-12-15

## 2023-12-15 PROCEDURE — RXMED WILLOW AMBULATORY MEDICATION CHARGE

## 2023-12-26 ENCOUNTER — HOSPITAL ENCOUNTER (INPATIENT)
Facility: HOSPITAL | Age: 23
LOS: 7 days | Discharge: HOME | End: 2024-01-03
Attending: EMERGENCY MEDICINE | Admitting: INTERNAL MEDICINE
Payer: MEDICAID

## 2023-12-26 ENCOUNTER — TELEPHONE (OUTPATIENT)
Dept: HEMATOLOGY/ONCOLOGY | Facility: HOSPITAL | Age: 23
End: 2023-12-26

## 2023-12-26 ENCOUNTER — ANCILLARY PROCEDURE (OUTPATIENT)
Dept: EMERGENCY MEDICINE | Facility: HOSPITAL | Age: 23
End: 2023-12-26
Payer: MEDICAID

## 2023-12-26 ENCOUNTER — APPOINTMENT (OUTPATIENT)
Dept: RADIOLOGY | Facility: HOSPITAL | Age: 23
End: 2023-12-26
Payer: MEDICAID

## 2023-12-26 DIAGNOSIS — D57.00 SICKLE CELL DISEASE WITH CRISIS (MULTI): ICD-10-CM

## 2023-12-26 DIAGNOSIS — H36.819: ICD-10-CM

## 2023-12-26 DIAGNOSIS — D57.00 SICKLE CELL PAIN CRISIS (MULTI): ICD-10-CM

## 2023-12-26 DIAGNOSIS — D57.09: ICD-10-CM

## 2023-12-26 DIAGNOSIS — H36.89: ICD-10-CM

## 2023-12-26 DIAGNOSIS — J11.1 FLU: Primary | ICD-10-CM

## 2023-12-26 LAB
ABO GROUP (TYPE) IN BLOOD: NORMAL
ALBUMIN SERPL BCP-MCNC: 4.2 G/DL (ref 3.4–5)
ALP SERPL-CCNC: 98 U/L (ref 33–120)
ALT SERPL W P-5'-P-CCNC: 33 U/L (ref 10–52)
ANION GAP SERPL CALC-SCNC: 12 MMOL/L (ref 10–20)
ANTIBODY SCREEN: NORMAL
AST SERPL W P-5'-P-CCNC: 59 U/L (ref 9–39)
ATRIAL RATE: 88 BPM
BASOPHILS # BLD AUTO: 0.08 X10*3/UL (ref 0–0.1)
BASOPHILS NFR BLD AUTO: 0.6 %
BILIRUB SERPL-MCNC: 2.6 MG/DL (ref 0–1.2)
BUN SERPL-MCNC: 9 MG/DL (ref 6–23)
CALCIUM SERPL-MCNC: 9.2 MG/DL (ref 8.6–10.6)
CHLORIDE SERPL-SCNC: 105 MMOL/L (ref 98–107)
CO2 SERPL-SCNC: 27 MMOL/L (ref 21–32)
CREAT SERPL-MCNC: 0.75 MG/DL (ref 0.5–1.3)
EOSINOPHIL # BLD AUTO: 0.01 X10*3/UL (ref 0–0.7)
EOSINOPHIL NFR BLD AUTO: 0.1 %
ERYTHROCYTE [DISTWIDTH] IN BLOOD BY AUTOMATED COUNT: 21.8 % (ref 11.5–14.5)
FLUAV RNA RESP QL NAA+PROBE: NOT DETECTED
FLUBV RNA RESP QL NAA+PROBE: NOT DETECTED
GFR SERPL CREATININE-BSD FRML MDRD: >90 ML/MIN/1.73M*2
GLUCOSE SERPL-MCNC: 118 MG/DL (ref 74–99)
HCT VFR BLD AUTO: 23.4 % (ref 41–52)
HGB BLD-MCNC: 9.1 G/DL (ref 13.5–17.5)
HGB RETIC QN: 39 PG (ref 28–38)
IMM GRANULOCYTES # BLD AUTO: 0.45 X10*3/UL (ref 0–0.7)
IMM GRANULOCYTES NFR BLD AUTO: 3.3 % (ref 0–0.9)
IMMATURE RETIC FRACTION: 42.1 %
LDH SERPL L TO P-CCNC: 701 U/L (ref 84–246)
LYMPHOCYTES # BLD AUTO: 3.12 X10*3/UL (ref 1.2–4.8)
LYMPHOCYTES NFR BLD AUTO: 22.9 %
MCH RBC QN AUTO: 41 PG (ref 26–34)
MCHC RBC AUTO-ENTMCNC: 38.9 G/DL (ref 32–36)
MCV RBC AUTO: 105 FL (ref 80–100)
MONOCYTES # BLD AUTO: 0.79 X10*3/UL (ref 0.1–1)
MONOCYTES NFR BLD AUTO: 5.8 %
NEUTROPHILS # BLD AUTO: 9.16 X10*3/UL (ref 1.2–7.7)
NEUTROPHILS NFR BLD AUTO: 67.3 %
NRBC BLD-RTO: 18.8 /100 WBCS (ref 0–0)
P AXIS: 77 DEGREES
P OFFSET: 192 MS
P ONSET: 152 MS
PLATELET # BLD AUTO: 144 X10*3/UL (ref 150–450)
POTASSIUM SERPL-SCNC: 4 MMOL/L (ref 3.5–5.3)
PR INTERVAL: 130 MS
PROT SERPL-MCNC: 8.1 G/DL (ref 6.4–8.2)
Q ONSET: 217 MS
QRS COUNT: 15 BEATS
QRS DURATION: 80 MS
QT INTERVAL: 374 MS
QTC CALCULATION(BAZETT): 452 MS
QTC FREDERICIA: 425 MS
R AXIS: 70 DEGREES
RBC # BLD AUTO: 2.22 X10*6/UL (ref 4.5–5.9)
RBC MORPH BLD: NORMAL
RETICS #: 0.15 X10*6/UL (ref 0.02–0.12)
RETICS/RBC NFR AUTO: 6.8 % (ref 0.5–2)
RH FACTOR (ANTIGEN D): NORMAL
SARS-COV-2 RNA RESP QL NAA+PROBE: NOT DETECTED
SODIUM SERPL-SCNC: 140 MMOL/L (ref 136–145)
T AXIS: 28 DEGREES
T OFFSET: 404 MS
VENTRICULAR RATE: 88 BPM
WBC # BLD AUTO: 13.6 X10*3/UL (ref 4.4–11.3)

## 2023-12-26 PROCEDURE — 83021 HEMOGLOBIN CHROMOTOGRAPHY: CPT | Performed by: STUDENT IN AN ORGANIZED HEALTH CARE EDUCATION/TRAINING PROGRAM

## 2023-12-26 PROCEDURE — 71046 X-RAY EXAM CHEST 2 VIEWS: CPT | Performed by: RADIOLOGY

## 2023-12-26 PROCEDURE — 87636 SARSCOV2 & INF A&B AMP PRB: CPT | Performed by: EMERGENCY MEDICINE

## 2023-12-26 PROCEDURE — 80053 COMPREHEN METABOLIC PANEL: CPT | Performed by: EMERGENCY MEDICINE

## 2023-12-26 PROCEDURE — 36415 COLL VENOUS BLD VENIPUNCTURE: CPT | Performed by: EMERGENCY MEDICINE

## 2023-12-26 PROCEDURE — 96374 THER/PROPH/DIAG INJ IV PUSH: CPT

## 2023-12-26 PROCEDURE — 93010 ELECTROCARDIOGRAM REPORT: CPT | Performed by: EMERGENCY MEDICINE

## 2023-12-26 PROCEDURE — 85045 AUTOMATED RETICULOCYTE COUNT: CPT | Performed by: EMERGENCY MEDICINE

## 2023-12-26 PROCEDURE — 2500000004 HC RX 250 GENERAL PHARMACY W/ HCPCS (ALT 636 FOR OP/ED): Mod: SE | Performed by: EMERGENCY MEDICINE

## 2023-12-26 PROCEDURE — 93005 ELECTROCARDIOGRAM TRACING: CPT

## 2023-12-26 PROCEDURE — 96376 TX/PRO/DX INJ SAME DRUG ADON: CPT

## 2023-12-26 PROCEDURE — 86901 BLOOD TYPING SEROLOGIC RH(D): CPT | Performed by: EMERGENCY MEDICINE

## 2023-12-26 PROCEDURE — 99285 EMERGENCY DEPT VISIT HI MDM: CPT | Performed by: EMERGENCY MEDICINE

## 2023-12-26 PROCEDURE — 71046 X-RAY EXAM CHEST 2 VIEWS: CPT

## 2023-12-26 PROCEDURE — 85025 COMPLETE CBC W/AUTO DIFF WBC: CPT | Performed by: EMERGENCY MEDICINE

## 2023-12-26 PROCEDURE — 83020 HEMOGLOBIN ELECTROPHORESIS: CPT | Performed by: STUDENT IN AN ORGANIZED HEALTH CARE EDUCATION/TRAINING PROGRAM

## 2023-12-26 PROCEDURE — 96361 HYDRATE IV INFUSION ADD-ON: CPT

## 2023-12-26 PROCEDURE — 83615 LACTATE (LD) (LDH) ENZYME: CPT | Performed by: EMERGENCY MEDICINE

## 2023-12-26 PROCEDURE — G0378 HOSPITAL OBSERVATION PER HR: HCPCS

## 2023-12-26 PROCEDURE — 96375 TX/PRO/DX INJ NEW DRUG ADDON: CPT

## 2023-12-26 RX ORDER — HYDROMORPHONE HYDROCHLORIDE 1 MG/ML
1 INJECTION, SOLUTION INTRAMUSCULAR; INTRAVENOUS; SUBCUTANEOUS ONCE
Status: COMPLETED | OUTPATIENT
Start: 2023-12-26 | End: 2023-12-26

## 2023-12-26 RX ORDER — KETOROLAC TROMETHAMINE 30 MG/ML
30 INJECTION, SOLUTION INTRAMUSCULAR; INTRAVENOUS ONCE
Status: COMPLETED | OUTPATIENT
Start: 2023-12-26 | End: 2023-12-26

## 2023-12-26 RX ORDER — OSELTAMIVIR PHOSPHATE 75 MG/1
75 CAPSULE ORAL ONCE
Status: COMPLETED | OUTPATIENT
Start: 2023-12-26 | End: 2023-12-26

## 2023-12-26 RX ORDER — HYDROMORPHONE HYDROCHLORIDE 1 MG/ML
0.6 INJECTION, SOLUTION INTRAMUSCULAR; INTRAVENOUS; SUBCUTANEOUS
Status: DISCONTINUED | OUTPATIENT
Start: 2023-12-26 | End: 2023-12-26

## 2023-12-26 RX ADMIN — HYDROMORPHONE HYDROCHLORIDE 1 MG: 1 INJECTION, SOLUTION INTRAMUSCULAR; INTRAVENOUS; SUBCUTANEOUS at 22:43

## 2023-12-26 RX ADMIN — OSELTAMIVIR PHOSPHATE 75 MG: 75 CAPSULE ORAL at 19:25

## 2023-12-26 RX ADMIN — SODIUM CHLORIDE, POTASSIUM CHLORIDE, SODIUM LACTATE AND CALCIUM CHLORIDE 1000 ML: 600; 310; 30; 20 INJECTION, SOLUTION INTRAVENOUS at 19:20

## 2023-12-26 RX ADMIN — HYDROMORPHONE HYDROCHLORIDE 0.6 MG: 1 INJECTION, SOLUTION INTRAMUSCULAR; INTRAVENOUS; SUBCUTANEOUS at 19:25

## 2023-12-26 RX ADMIN — KETOROLAC TROMETHAMINE 30 MG: 30 INJECTION, SOLUTION INTRAMUSCULAR; INTRAVENOUS at 20:37

## 2023-12-26 RX ADMIN — SODIUM CHLORIDE, POTASSIUM CHLORIDE, SODIUM LACTATE AND CALCIUM CHLORIDE 1000 ML: 600; 310; 30; 20 INJECTION, SOLUTION INTRAVENOUS at 22:06

## 2023-12-26 ASSESSMENT — LIFESTYLE VARIABLES
REASON UNABLE TO ASSESS: YES
EVER FELT BAD OR GUILTY ABOUT YOUR DRINKING: NO
HAVE YOU EVER FELT YOU SHOULD CUT DOWN ON YOUR DRINKING: NO
HAVE PEOPLE ANNOYED YOU BY CRITICIZING YOUR DRINKING: NO
EVER HAD A DRINK FIRST THING IN THE MORNING TO STEADY YOUR NERVES TO GET RID OF A HANGOVER: NO

## 2023-12-26 ASSESSMENT — PAIN SCALES - GENERAL
PAINLEVEL_OUTOF10: 8
PAINLEVEL_OUTOF10: 10 - WORST POSSIBLE PAIN

## 2023-12-26 ASSESSMENT — PAIN DESCRIPTION - LOCATION: LOCATION: BACK

## 2023-12-26 ASSESSMENT — PAIN - FUNCTIONAL ASSESSMENT
PAIN_FUNCTIONAL_ASSESSMENT: 0-10
PAIN_FUNCTIONAL_ASSESSMENT: 0-10

## 2023-12-26 ASSESSMENT — PAIN DESCRIPTION - ORIENTATION: ORIENTATION: LOWER

## 2023-12-26 NOTE — ED TRIAGE NOTES
Pt presents to ED for sickle cell crisis x 1 day. Pt states that he started to have a sickle cell crisis today, but it feels worse than it usually does. Pt states he was seen at an urgent care recently where he tested positive for flu. Pt states home medications(one dose which was doubled) are not alleviating symptoms. Pain is primarily in lower back, chest, and legs. Pt is Aox3.

## 2023-12-26 NOTE — ED PROVIDER NOTES
[facility] Emergency Medicine   Date:  12/28/2023  Patient:  Yakov Jimenez  YOB: 2000  MRN:  [id number]   ED Provider: Pranay Contreras DO    History of Present Illness:  Patient is a 23-year-old gentleman with a history of sickle cell disease presenting to the emergency room today with flulike symptoms and sickle cell pain crisis symptoms.  Patient reports symptoms for the last 24 hours: Fevers, chills, cough, shortness of breath, and bodyaches.  In addition he is having low back pain but this is consistent with sickle cell pain.  Pain is rating up his back.  Sharp and stabbing in nature.  Try to take his oxycodone at home with minimal relief.  Mother who accompanies him to the hospital reports decreased oral intake and is concerned for dehydration.  No exacerbating relieving factors of his symptoms.  Not on Tamiflu.  No additional complaints offered at time my assessment.    Limitations to history: None  Independent Historians: Mother  External Records Reviewed: Prior hospitalizations reviewed.    Past Medical History     No past medical history on file.  Past Surgical History:   Procedure Laterality Date    MR HEAD ANGIO WO IV CONTRAST  7/26/2019    MR HEAD ANGIO WO IV [place] [facility] [place]    OTHER SURGICAL HISTORY  07/14/2022    Eye surgery     Social History     Tobacco Use    Smoking status: Never    Smokeless tobacco: Never     Allergies   Allergen Reactions    Ceftriaxone Anaphylaxis     Current Outpatient Medications   Medication Instructions    acetaminophen (Tylenol) 500 mg tablet 1-2 tablets, oral, Daily PRN    Advair Diskus 250-50 mcg/dose diskus inhaler 1 puff, inhalation, Every 12 hours    albuterol 90 mcg/actuation inhaler Inhale 1-2 puffs  EVERY 4 TO 6 HOURS if needed for wheezing..    amoxicillin (Amoxil) 250 mg capsule TAKE 1 CAPSULE BY MOUTH EVERY 12 HOURS    calcium carbonate (Tums) 200 mg calcium chewable tablet 1 tablet,  oral, As needed    folic acid (Folvite) 1 mg tablet TAKE 1 TABLLET BY MOUTH ONCE A DAY    hydroxyurea (Hydrea) 500 mg capsule TAKE 4 CAPSULES BY MOUTH ONCE DAILY    meclizine (ANTIVERT) 25 mg, oral, 3 times daily PRN    naloxone (Narcan) 4 mg/0.1 mL nasal spray INSTILL 1 SPRAY IN ONE NOSTRIL AS NEEDED FOR ACCIDENTAL OPIOID OVERDOSE; REPEAT WITH SECOND DOSE IN 5 MINUTES IF NO RESPONSE.    naproxen (Naprosyn) 500 mg tablet oral,  May take one Naproxen with one tylenol for pain if tylenol alone doesn't work for mild to moderate sickle cell pain<BR>    oxyCODONE (ROXICODONE) 10 mg, oral, Every 6 hours PRN       Review of Systems:     ROS: a ten point review of systems was performed and was negative except as per HPI.    Physical Exam:    Vitals:    12/28/23 0939   BP: 129/74   Pulse: 97   Resp: 18   Temp: 37.1 °C (98.8 °F)   SpO2: 93%       Physical Exam  General: Awake and Alert, uncomfortable in appearance   Head: Atraumatic, Normocephalic  Eyes: Clear conjunctiva  Mouth: Dry MM  Throat: No pharyngeal erythema.  No tonsillar swelling  Lungs: Respirations non labored.    Cardiac: Regular Rate and Rhythm.  No murmur  Abdominal: Abdomen non distended  Neuro: Moves all extremities, gait normal  Extremities: Warm and well perfused.  Diffuse body tenderness  Psych: Appropriate mood and affect.       Diagnostics:    Labs Reviewed   CBC WITH AUTO DIFFERENTIAL - Abnormal       Result Value    WBC 13.6 (*)     nRBC 18.8 (*)     RBC 2.22 (*)     Hemoglobin 9.1 (*)     Hematocrit 23.4 (*)      (*)     MCH 41.0 (*)     MCHC 38.9 (*)     RDW 21.8 (*)     Platelets 144 (*)     Neutrophils % 67.3      Immature Granulocytes %, Automated 3.3 (*)     Lymphocytes % 22.9      Monocytes % 5.8      Eosinophils % 0.1      Basophils % 0.6      Neutrophils Absolute 9.16 (*)     Immature Granulocytes Absolute, Automated 0.45      Lymphocytes Absolute 3.12      Monocytes Absolute 0.79      Eosinophils Absolute 0.01      Basophils Absolute  0.08     COMPREHENSIVE METABOLIC PANEL - Abnormal    Glucose 118 (*)     Sodium 140      Potassium 4.0      Chloride 105      Bicarbonate 27      Anion Gap 12      Urea Nitrogen 9      Creatinine 0.75      eGFR >90      Calcium 9.2      Albumin 4.2      Alkaline Phosphatase 98      Total Protein 8.1      AST 59 (*)     Bilirubin, Total 2.6 (*)     ALT 33     LACTATE DEHYDROGENASE - Abnormal     (*)    RETICULOCYTES - Abnormal    Retic % 6.8 (*)     Retic Absolute 0.153 (*)     Reticulocyte Hemoglobin 39 (*)     Immature Retic fraction 42.1 (*)    HEMOGLOBIN IDENTIFICATION WITH PATH REVIEW - Abnormal    Hemoglobin F        Hemoglobin S 85.0 (*)     Hemoglobin A2        Hemoglobin Identification Interpretation        Pathologist Review-Hemoglobin Identification       CBC WITH AUTO DIFFERENTIAL - Abnormal    WBC 12.9 (*)     nRBC 25.5 (*)     RBC 2.13 (*)     Hemoglobin 9.0 (*)     Hematocrit 22.8 (*)      (*)     MCH 42.3 (*)     MCHC 39.5 (*)     RDW 22.6 (*)     Platelets 169      Neutrophils % 64.7      Immature Granulocytes %, Automated 1.9 (*)     Lymphocytes % 22.8      Monocytes % 10.0      Eosinophils % 0.3      Basophils % 0.3      Neutrophils Absolute 8.32 (*)     Immature Granulocytes Absolute, Automated 0.25      Lymphocytes Absolute 2.94      Monocytes Absolute 1.29 (*)     Eosinophils Absolute 0.04      Basophils Absolute 0.04     CBC WITH AUTO DIFFERENTIAL - Abnormal    WBC 14.1 (*)     nRBC 26.7 (*)     RBC 2.20 (*)     Hemoglobin 9.0 (*)     Hematocrit 23.7 (*)      (*)     MCH 40.9 (*)     MCHC 38.0 (*)     RDW 23.4 (*)     Platelets 225      Neutrophils % 74.5      Immature Granulocytes %, Automated 1.7 (*)     Lymphocytes % 17.4      Monocytes % 5.9      Eosinophils % 0.1      Basophils % 0.4      Neutrophils Absolute 10.49 (*)     Immature Granulocytes Absolute, Automated 0.24      Lymphocytes Absolute 2.44      Monocytes Absolute 0.83      Eosinophils Absolute 0.01       Basophils Absolute 0.05     COMPREHENSIVE METABOLIC PANEL - Abnormal    Glucose 128 (*)     Sodium 138      Potassium 4.8      Chloride 106      Bicarbonate 26      Anion Gap 11      Urea Nitrogen 10      Creatinine 0.75      eGFR >90      Calcium 9.1      Albumin 3.9      Alkaline Phosphatase 85      Total Protein 7.2      AST 60 (*)     Bilirubin, Total 3.1 (*)     ALT 28     COMPREHENSIVE METABOLIC PANEL - Abnormal    Glucose 122 (*)     Sodium 139      Potassium 4.4      Chloride 106      Bicarbonate 25      Anion Gap 12      Urea Nitrogen 11      Creatinine 0.91      eGFR >90      Calcium 8.9      Albumin 3.9      Alkaline Phosphatase 150 (*)     Total Protein 7.3      AST 76 (*)     Bilirubin, Total 4.2 (*)     ALT 28     LACTATE DEHYDROGENASE - Abnormal     (*)    RETICULOCYTES - Abnormal    Retic % 10.1 (*)     Retic Absolute 0.215 (*)     Reticulocyte Hemoglobin 37      Immature Retic fraction 28.9 (*)    HEMOGLOBIN IDENTIFICATION WITH PATH REVIEW - Abnormal    Hemoglobin F        Hemoglobin S 85.5 (*)     Hemoglobin A2        Hemoglobin Identification Interpretation        Pathologist Review-Hemoglobin Identification       LACTATE DEHYDROGENASE - Abnormal    LDH 1,043 (*)    CBC WITH AUTO DIFFERENTIAL - Abnormal    WBC 11.0      nRBC 38.2 (*)     RBC 2.04 (*)     Hemoglobin 8.4 (*)     Hematocrit 23.2 (*)      (*)     MCH 41.2 (*)     MCHC 36.2 (*)     RDW 22.9 (*)     Platelets 272      Neutrophils % 74.7      Immature Granulocytes %, Automated 1.3 (*)     Lymphocytes % 17.7      Monocytes % 5.9      Eosinophils % 0.1      Basophils % 0.3      Neutrophils Absolute 8.21 (*)     Immature Granulocytes Absolute, Automated 0.14      Lymphocytes Absolute 1.94      Monocytes Absolute 0.65      Eosinophils Absolute 0.01      Basophils Absolute 0.03     COMPREHENSIVE METABOLIC PANEL - Abnormal    Glucose 87      Sodium 141      Potassium 3.6      Chloride 105      Bicarbonate 29      Anion Gap 11       Urea Nitrogen 8      Creatinine 0.74      eGFR >90      Calcium 9.1      Albumin 3.6      Alkaline Phosphatase 174 (*)     Total Protein 7.1      AST 47 (*)     Bilirubin, Total 3.8 (*)     ALT 22     LACTATE DEHYDROGENASE - Abnormal    LDH 1,098 (*)    RETICULOCYTES - Abnormal    Retic % 10.1 (*)     Retic Absolute 0.206 (*)     Reticulocyte Hemoglobin 36      Immature Retic fraction 43.8 (*)    SARS-COV-2 AND INFLUENZA A/B PCR - Normal    Flu A Result Not Detected      Flu B Result Not Detected      Coronavirus 2019, PCR Not Detected      Narrative:     This assay has received FDA Emergency Use Authorization (EUA) and  is only authorized for the duration of time that circumstances exist to justify the authorization of the emergency use of in vitro diagnostic tests for the detection of SARS-CoV-2 virus and/or diagnosis of COVID-19 infection under section 564(b)(1) of the Act, 21 U.S.C. 360bbb-3(b)(1). Testing for SARS-CoV-2 is only recommended for patients who meet current clinical and/or epidemiological criteria as defined by federal, state, or local public health directives. This assay is an in vitro diagnostic nucleic acid amplification test for the qualitative detection of SARS-CoV-2, Influenza A, and Influenza B from nasopharyngeal specimens and has been validated for use at [facility]. Negative results do not preclude COVID-19 infections or Influenza A/B infections, and should not be used as the sole basis for diagnosis, treatment, or other management decisions. If Influenza A/B and RSV PCR results are negative, testing for Parainfluenza virus, Adenovirus and Metapneumovirus is routinely performed for [facility] [place], and is available on other patients by placing an add-on request.    SCREEN OPIATE/OPIOID/BENZO PRESCRIPTION COMPLIANCE - Normal    Creatinine, Urine Random 60.6      Amphetamine Screen, Urine Presumptive Negative      Barbiturate  Screen, Urine Presumptive Negative      Cannabinoid Screen, Urine Presumptive Negative      Cocaine Metabolite Screen, Urine Presumptive Negative      PCP Screen, Urine Presumptive Negative     TYPE AND SCREEN    ABO TYPE O      Rh TYPE POS      ANTIBODY SCREEN NEG     OOB INTERNAL TRACKING   RESPIRATORY VIRAL PANEL   OPIATE/OPIOID/BENZO PRESCRIPTION COMPLIANCE    Narrative:     The following orders were created for panel order Opiate/Opioid/Benzo Prescription Compliance.  Procedure                               Abnormality         Status                     ---------                               -----------         ------                     Screen Opiate/Opioid/Kvng...[[id number]]  Normal              Final result               Confirmation Opiate/Opio...[[id number]]                      In process                   Please view results for these tests on the individual orders.   CONFIRMATION OPIATE/OPIOID/BENZO PRESCRIPTION COMPLIANCE   MORPHOLOGY    RBC Morphology See Below     MORPHOLOGY    RBC Morphology See Below      Sickle Cells Few      Target Cells Few      Basophilic Stippling Present      [place] Bodies Present     MORPHOLOGY    RBC Morphology See Below      Polychromasia Mild      Sickle Cells Few      Target Cells Few      Basophilic Stippling Present      [place] Bodies Present     PATH REVIEW-CBC DIFFERENTIAL       XR chest 2 views   Final Result   1.  No evidence of acute cardiopulmonary process.        I personally reviewed the images/study and I agree with the findings   as stated by Kumar Trejo MD. This study was interpreted at   [place], [place].        MACRO:   None.        Signed by: Evan Finkelstein 12/26/2023 8:01 PM   Dictation workstation:   [id number]          Medications   fluticasone furoate-vilanteroL (Breo Ellipta) 200-25 mcg/dose inhaler 1 puff (1 puff inhalation Given 12/28/23 0847)   albuterol 90 mcg/actuation inhaler  1-2 puff (has no administration in time range)   amoxicillin (Amoxil) capsule 250 mg (250 mg oral Given 12/28/23 0032)   calcium carbonate (Tums) chewable tablet 500 mg (500 mg oral Given 12/27/23 0848)   folic acid (Folvite) tablet 1 mg (1 mg oral Given 12/28/23 0848)   hydroxyurea (Hydrea) capsule 2,000 mg ( oral Dose Auto Held 1/4/24 0900)   melatonin tablet 5 mg (5 mg oral Not Given 12/27/23 0120)   oxyCODONE (Roxicodone) immediate release tablet 10 mg (10 mg oral Given 12/28/23 0608)   lactated Ringer's infusion (100 mL/hr intravenous New Bag 12/28/23 0032)   pantoprazole (ProtoNix) EC tablet 40 mg (40 mg oral Given 12/28/23 0608)   enoxaparin (Lovenox) syringe 40 mg (40 mg subcutaneous Given 12/28/23 0847)   HYDROmorphone (Dilaudid) injection 0.4 mg (0.4 mg intravenous Given 12/27/23 2151)   ketorolac (Toradol) injection 30 mg (30 mg intravenous Given 12/28/23 0608)   sennosides (Senokot) tablet 8.6 mg (8.6 mg oral Given 12/28/23 0848)   lactated Ringer's bolus 1,000 mL (0 mL intravenous Stopped 12/27/23 0006)   lactated Ringer's bolus 1,000 mL (0 mL intravenous Stopped 12/26/23 2120)   oseltamivir (Tamiflu) capsule 75 mg (75 mg oral Given 12/26/23 1925)   ketorolac (Toradol) injection 30 mg (30 mg intravenous Given 12/26/23 2037)   HYDROmorphone (Dilaudid) injection 1 mg (1 mg intravenous Given 12/26/23 2243)   ondansetron (Zofran) injection 4 mg (4 mg intravenous Given 12/28/23 0431)         Hospital Course / Medical Decision Making:    Diagnoses as of 12/28/23 1031   Sickle cell pain crisis (CMS/HCC)   Flu       Patient is a 19-year-old gentleman with a history of sickle cell disease presenting to the emergency room today complaining of diffuse bodyaches, sickle cell pain crisis, and recent influenza positive test.  Patient seen on his arrival to the emergency department.  Vital signs reviewed: Afebrile, normotensive, saturating well on room air.  On exam the patient is ill in appearance with diffuse body  tenderness.  Mother at bedside notes concerned about the patient as she feels that he is dehydrated does not frequently visit the hospital for sickle cell pain crises but when he does get sick he oftentimes requires admission.  EKG performed shows a sinus rhythm of 88 bpm, normal axis, normal ST segments.  Labs obtained and reviewed.  Chest x-ray without infiltrate.  Patient given symptomatic management including IV narcotics, IV Toradol, and fluids.  Upon reassessment patient reports continued symptoms.  Given such patient referred for admission to the hospital.        Diagnosis  Sickle Cell Pain Crisis    Disposition: Admit    Critical Care Time: NA    Pranay Contreras, DO  EM Attending      Pranay Contreras, DO  12/26/23 4118       Pranay Contreras, DO  12/28/23 1037

## 2023-12-27 PROBLEM — J11.1 FLU: Status: ACTIVE | Noted: 2023-12-27

## 2023-12-27 LAB
ALBUMIN SERPL BCP-MCNC: 3.9 G/DL (ref 3.4–5)
ALBUMIN SERPL BCP-MCNC: 3.9 G/DL (ref 3.4–5)
ALP SERPL-CCNC: 150 U/L (ref 33–120)
ALP SERPL-CCNC: 85 U/L (ref 33–120)
ALT SERPL W P-5'-P-CCNC: 28 U/L (ref 10–52)
ALT SERPL W P-5'-P-CCNC: 28 U/L (ref 10–52)
AMPHETAMINES UR QL SCN: NORMAL
ANION GAP SERPL CALC-SCNC: 11 MMOL/L (ref 10–20)
ANION GAP SERPL CALC-SCNC: 12 MMOL/L (ref 10–20)
AST SERPL W P-5'-P-CCNC: 60 U/L (ref 9–39)
AST SERPL W P-5'-P-CCNC: 76 U/L (ref 9–39)
BARBITURATES UR QL SCN: NORMAL
BASO STIPL BLD QL SMEAR: PRESENT
BASOPHILS # BLD AUTO: 0.04 X10*3/UL (ref 0–0.1)
BASOPHILS # BLD AUTO: 0.05 X10*3/UL (ref 0–0.1)
BASOPHILS NFR BLD AUTO: 0.3 %
BASOPHILS NFR BLD AUTO: 0.4 %
BILIRUB SERPL-MCNC: 3.1 MG/DL (ref 0–1.2)
BILIRUB SERPL-MCNC: 4.2 MG/DL (ref 0–1.2)
BUN SERPL-MCNC: 10 MG/DL (ref 6–23)
BUN SERPL-MCNC: 11 MG/DL (ref 6–23)
BZE UR QL SCN: NORMAL
CALCIUM SERPL-MCNC: 8.9 MG/DL (ref 8.6–10.6)
CALCIUM SERPL-MCNC: 9.1 MG/DL (ref 8.6–10.6)
CANNABINOIDS UR QL SCN: NORMAL
CHLORIDE SERPL-SCNC: 106 MMOL/L (ref 98–107)
CHLORIDE SERPL-SCNC: 106 MMOL/L (ref 98–107)
CO2 SERPL-SCNC: 25 MMOL/L (ref 21–32)
CO2 SERPL-SCNC: 26 MMOL/L (ref 21–32)
CREAT SERPL-MCNC: 0.75 MG/DL (ref 0.5–1.3)
CREAT SERPL-MCNC: 0.91 MG/DL (ref 0.5–1.3)
CREAT UR-MCNC: 60.6 MG/DL (ref 20–370)
EOSINOPHIL # BLD AUTO: 0.01 X10*3/UL (ref 0–0.7)
EOSINOPHIL # BLD AUTO: 0.04 X10*3/UL (ref 0–0.7)
EOSINOPHIL NFR BLD AUTO: 0.1 %
EOSINOPHIL NFR BLD AUTO: 0.3 %
ERYTHROCYTE [DISTWIDTH] IN BLOOD BY AUTOMATED COUNT: 22.6 % (ref 11.5–14.5)
ERYTHROCYTE [DISTWIDTH] IN BLOOD BY AUTOMATED COUNT: 23.4 % (ref 11.5–14.5)
GFR SERPL CREATININE-BSD FRML MDRD: >90 ML/MIN/1.73M*2
GFR SERPL CREATININE-BSD FRML MDRD: >90 ML/MIN/1.73M*2
GLUCOSE SERPL-MCNC: 122 MG/DL (ref 74–99)
GLUCOSE SERPL-MCNC: 128 MG/DL (ref 74–99)
HCT VFR BLD AUTO: 22.8 % (ref 41–52)
HCT VFR BLD AUTO: 23.7 % (ref 41–52)
HGB BLD-MCNC: 9 G/DL (ref 13.5–17.5)
HGB BLD-MCNC: 9 G/DL (ref 13.5–17.5)
HGB RETIC QN: 37 PG (ref 28–38)
HOWELL-JOLLY BOD BLD QL SMEAR: PRESENT
IMM GRANULOCYTES # BLD AUTO: 0.24 X10*3/UL (ref 0–0.7)
IMM GRANULOCYTES # BLD AUTO: 0.25 X10*3/UL (ref 0–0.7)
IMM GRANULOCYTES NFR BLD AUTO: 1.7 % (ref 0–0.9)
IMM GRANULOCYTES NFR BLD AUTO: 1.9 % (ref 0–0.9)
IMMATURE RETIC FRACTION: 28.9 %
LDH SERPL L TO P-CCNC: 1043 U/L (ref 84–246)
LDH SERPL L TO P-CCNC: 741 U/L (ref 84–246)
LYMPHOCYTES # BLD AUTO: 2.44 X10*3/UL (ref 1.2–4.8)
LYMPHOCYTES # BLD AUTO: 2.94 X10*3/UL (ref 1.2–4.8)
LYMPHOCYTES NFR BLD AUTO: 17.4 %
LYMPHOCYTES NFR BLD AUTO: 22.8 %
MCH RBC QN AUTO: 40.9 PG (ref 26–34)
MCH RBC QN AUTO: 42.3 PG (ref 26–34)
MCHC RBC AUTO-ENTMCNC: 38 G/DL (ref 32–36)
MCHC RBC AUTO-ENTMCNC: 39.5 G/DL (ref 32–36)
MCV RBC AUTO: 107 FL (ref 80–100)
MCV RBC AUTO: 108 FL (ref 80–100)
MONOCYTES # BLD AUTO: 0.83 X10*3/UL (ref 0.1–1)
MONOCYTES # BLD AUTO: 1.29 X10*3/UL (ref 0.1–1)
MONOCYTES NFR BLD AUTO: 10 %
MONOCYTES NFR BLD AUTO: 5.9 %
NEUTROPHILS # BLD AUTO: 10.49 X10*3/UL (ref 1.2–7.7)
NEUTROPHILS # BLD AUTO: 8.32 X10*3/UL (ref 1.2–7.7)
NEUTROPHILS NFR BLD AUTO: 64.7 %
NEUTROPHILS NFR BLD AUTO: 74.5 %
NRBC BLD-RTO: 25.5 /100 WBCS (ref 0–0)
NRBC BLD-RTO: 26.7 /100 WBCS (ref 0–0)
PCP UR QL SCN: NORMAL
PLATELET # BLD AUTO: 169 X10*3/UL (ref 150–450)
PLATELET # BLD AUTO: 225 X10*3/UL (ref 150–450)
POTASSIUM SERPL-SCNC: 4.4 MMOL/L (ref 3.5–5.3)
POTASSIUM SERPL-SCNC: 4.8 MMOL/L (ref 3.5–5.3)
PROT SERPL-MCNC: 7.2 G/DL (ref 6.4–8.2)
PROT SERPL-MCNC: 7.3 G/DL (ref 6.4–8.2)
RBC # BLD AUTO: 2.13 X10*6/UL (ref 4.5–5.9)
RBC # BLD AUTO: 2.2 X10*6/UL (ref 4.5–5.9)
RBC MORPH BLD: NORMAL
RETICS #: 0.21 X10*6/UL (ref 0.02–0.12)
RETICS/RBC NFR AUTO: 10.1 % (ref 0.5–2)
SICKLE CELLS BLD QL SMEAR: NORMAL
SODIUM SERPL-SCNC: 138 MMOL/L (ref 136–145)
SODIUM SERPL-SCNC: 139 MMOL/L (ref 136–145)
TARGETS BLD QL SMEAR: NORMAL
WBC # BLD AUTO: 12.9 X10*3/UL (ref 4.4–11.3)
WBC # BLD AUTO: 14.1 X10*3/UL (ref 4.4–11.3)

## 2023-12-27 PROCEDURE — 83615 LACTATE (LD) (LDH) ENZYME: CPT | Performed by: STUDENT IN AN ORGANIZED HEALTH CARE EDUCATION/TRAINING PROGRAM

## 2023-12-27 PROCEDURE — 99233 SBSQ HOSP IP/OBS HIGH 50: CPT | Performed by: NURSE PRACTITIONER

## 2023-12-27 PROCEDURE — 99223 1ST HOSP IP/OBS HIGH 75: CPT | Performed by: STUDENT IN AN ORGANIZED HEALTH CARE EDUCATION/TRAINING PROGRAM

## 2023-12-27 PROCEDURE — 87632 RESP VIRUS 6-11 TARGETS: CPT | Performed by: STUDENT IN AN ORGANIZED HEALTH CARE EDUCATION/TRAINING PROGRAM

## 2023-12-27 PROCEDURE — 80307 DRUG TEST PRSMV CHEM ANLYZR: CPT | Performed by: STUDENT IN AN ORGANIZED HEALTH CARE EDUCATION/TRAINING PROGRAM

## 2023-12-27 PROCEDURE — 84075 ASSAY ALKALINE PHOSPHATASE: CPT | Performed by: STUDENT IN AN ORGANIZED HEALTH CARE EDUCATION/TRAINING PROGRAM

## 2023-12-27 PROCEDURE — 1170000001 HC PRIVATE ONCOLOGY ROOM DAILY

## 2023-12-27 PROCEDURE — 2500000001 HC RX 250 WO HCPCS SELF ADMINISTERED DRUGS (ALT 637 FOR MEDICARE OP): Performed by: STUDENT IN AN ORGANIZED HEALTH CARE EDUCATION/TRAINING PROGRAM

## 2023-12-27 PROCEDURE — 2500000004 HC RX 250 GENERAL PHARMACY W/ HCPCS (ALT 636 FOR OP/ED): Performed by: NURSE PRACTITIONER

## 2023-12-27 PROCEDURE — 85025 COMPLETE CBC W/AUTO DIFF WBC: CPT | Performed by: STUDENT IN AN ORGANIZED HEALTH CARE EDUCATION/TRAINING PROGRAM

## 2023-12-27 PROCEDURE — 2500000001 HC RX 250 WO HCPCS SELF ADMINISTERED DRUGS (ALT 637 FOR MEDICARE OP): Performed by: NURSE PRACTITIONER

## 2023-12-27 PROCEDURE — 36415 COLL VENOUS BLD VENIPUNCTURE: CPT | Performed by: STUDENT IN AN ORGANIZED HEALTH CARE EDUCATION/TRAINING PROGRAM

## 2023-12-27 PROCEDURE — 80346 BENZODIAZEPINES1-12: CPT | Performed by: STUDENT IN AN ORGANIZED HEALTH CARE EDUCATION/TRAINING PROGRAM

## 2023-12-27 PROCEDURE — 2500000004 HC RX 250 GENERAL PHARMACY W/ HCPCS (ALT 636 FOR OP/ED): Performed by: STUDENT IN AN ORGANIZED HEALTH CARE EDUCATION/TRAINING PROGRAM

## 2023-12-27 PROCEDURE — 85045 AUTOMATED RETICULOCYTE COUNT: CPT | Performed by: STUDENT IN AN ORGANIZED HEALTH CARE EDUCATION/TRAINING PROGRAM

## 2023-12-27 PROCEDURE — 83021 HEMOGLOBIN CHROMOTOGRAPHY: CPT | Performed by: STUDENT IN AN ORGANIZED HEALTH CARE EDUCATION/TRAINING PROGRAM

## 2023-12-27 RX ORDER — SODIUM CHLORIDE, SODIUM LACTATE, POTASSIUM CHLORIDE, CALCIUM CHLORIDE 600; 310; 30; 20 MG/100ML; MG/100ML; MG/100ML; MG/100ML
100 INJECTION, SOLUTION INTRAVENOUS CONTINUOUS
Status: DISCONTINUED | OUTPATIENT
Start: 2023-12-27 | End: 2023-12-29

## 2023-12-27 RX ORDER — CALCIUM CARBONATE 200(500)MG
1 TABLET,CHEWABLE ORAL AS NEEDED
Status: DISCONTINUED | OUTPATIENT
Start: 2023-12-27 | End: 2024-01-03 | Stop reason: HOSPADM

## 2023-12-27 RX ORDER — POLYETHYLENE GLYCOL 3350 17 G/17G
17 POWDER, FOR SOLUTION ORAL 2 TIMES DAILY PRN
Status: DISCONTINUED | OUTPATIENT
Start: 2023-12-27 | End: 2023-12-27

## 2023-12-27 RX ORDER — ENOXAPARIN SODIUM 100 MG/ML
40 INJECTION SUBCUTANEOUS
Status: DISCONTINUED | OUTPATIENT
Start: 2023-12-27 | End: 2024-01-03 | Stop reason: HOSPADM

## 2023-12-27 RX ORDER — PANTOPRAZOLE SODIUM 40 MG/1
40 TABLET, DELAYED RELEASE ORAL
Status: DISCONTINUED | OUTPATIENT
Start: 2023-12-27 | End: 2024-01-03 | Stop reason: HOSPADM

## 2023-12-27 RX ORDER — ACETAMINOPHEN 500 MG
5 TABLET ORAL NIGHTLY PRN
Status: DISCONTINUED | OUTPATIENT
Start: 2023-12-27 | End: 2024-01-03 | Stop reason: HOSPADM

## 2023-12-27 RX ORDER — OXYCODONE HYDROCHLORIDE 5 MG/1
10 TABLET ORAL EVERY 6 HOURS PRN
Status: DISCONTINUED | OUTPATIENT
Start: 2023-12-27 | End: 2024-01-03 | Stop reason: HOSPADM

## 2023-12-27 RX ORDER — HYDROXYUREA 500 MG/1
2000 CAPSULE ORAL DAILY
Status: DISCONTINUED | OUTPATIENT
Start: 2023-12-27 | End: 2024-01-03 | Stop reason: HOSPADM

## 2023-12-27 RX ORDER — FLUTICASONE FUROATE AND VILANTEROL 200; 25 UG/1; UG/1
1 POWDER RESPIRATORY (INHALATION)
Status: DISCONTINUED | OUTPATIENT
Start: 2023-12-27 | End: 2024-01-03 | Stop reason: HOSPADM

## 2023-12-27 RX ORDER — KETOROLAC TROMETHAMINE 30 MG/ML
30 INJECTION, SOLUTION INTRAMUSCULAR; INTRAVENOUS 4 TIMES DAILY
Status: COMPLETED | OUTPATIENT
Start: 2023-12-27 | End: 2023-12-30

## 2023-12-27 RX ORDER — HYDROMORPHONE HYDROCHLORIDE 1 MG/ML
0.4 INJECTION, SOLUTION INTRAMUSCULAR; INTRAVENOUS; SUBCUTANEOUS
Status: DISCONTINUED | OUTPATIENT
Start: 2023-12-27 | End: 2024-01-03 | Stop reason: HOSPADM

## 2023-12-27 RX ORDER — OXYCODONE HYDROCHLORIDE 10 MG/1
10 TABLET ORAL EVERY 6 HOURS PRN
COMMUNITY
End: 2024-02-26 | Stop reason: SDUPTHER

## 2023-12-27 RX ORDER — DOCUSATE SODIUM 100 MG/1
100 CAPSULE, LIQUID FILLED ORAL 2 TIMES DAILY PRN
Status: DISCONTINUED | OUTPATIENT
Start: 2023-12-27 | End: 2023-12-27

## 2023-12-27 RX ORDER — ACETAMINOPHEN 325 MG/1
970 TABLET ORAL EVERY 8 HOURS PRN
Status: DISCONTINUED | OUTPATIENT
Start: 2023-12-27 | End: 2023-12-27

## 2023-12-27 RX ORDER — NAPROXEN 500 MG/1
500 TABLET ORAL 2 TIMES DAILY PRN
Status: DISCONTINUED | OUTPATIENT
Start: 2023-12-27 | End: 2023-12-27

## 2023-12-27 RX ORDER — AMOXICILLIN 250 MG/1
250 CAPSULE ORAL EVERY 12 HOURS
Status: DISCONTINUED | OUTPATIENT
Start: 2023-12-27 | End: 2023-12-31 | Stop reason: ALTCHOICE

## 2023-12-27 RX ORDER — ALBUTEROL SULFATE 90 UG/1
1-2 AEROSOL, METERED RESPIRATORY (INHALATION) EVERY 6 HOURS PRN
Status: DISCONTINUED | OUTPATIENT
Start: 2023-12-27 | End: 2024-01-03 | Stop reason: HOSPADM

## 2023-12-27 RX ORDER — FOLIC ACID 1 MG/1
1 TABLET ORAL DAILY
Status: DISCONTINUED | OUTPATIENT
Start: 2023-12-27 | End: 2024-01-03 | Stop reason: HOSPADM

## 2023-12-27 RX ORDER — KETOROLAC TROMETHAMINE 30 MG/ML
30 INJECTION, SOLUTION INTRAMUSCULAR; INTRAVENOUS EVERY 6 HOURS PRN
Status: DISCONTINUED | OUTPATIENT
Start: 2023-12-27 | End: 2023-12-27

## 2023-12-27 RX ORDER — SENNOSIDES 8.6 MG/1
1 TABLET ORAL 2 TIMES DAILY
Status: DISCONTINUED | OUTPATIENT
Start: 2023-12-27 | End: 2024-01-03 | Stop reason: HOSPADM

## 2023-12-27 RX ADMIN — AMOXICILLIN 250 MG: 250 CAPSULE ORAL at 11:21

## 2023-12-27 RX ADMIN — KETOROLAC TROMETHAMINE 30 MG: 30 INJECTION, SOLUTION INTRAMUSCULAR; INTRAVENOUS at 12:46

## 2023-12-27 RX ADMIN — ACETAMINOPHEN 975 MG: 325 TABLET ORAL at 08:49

## 2023-12-27 RX ADMIN — ENOXAPARIN SODIUM 40 MG: 100 INJECTION SUBCUTANEOUS at 08:44

## 2023-12-27 RX ADMIN — OXYCODONE HYDROCHLORIDE 10 MG: 5 TABLET ORAL at 01:20

## 2023-12-27 RX ADMIN — SODIUM CHLORIDE, POTASSIUM CHLORIDE, SODIUM LACTATE AND CALCIUM CHLORIDE 100 ML/HR: 600; 310; 30; 20 INJECTION, SOLUTION INTRAVENOUS at 01:49

## 2023-12-27 RX ADMIN — KETOROLAC TROMETHAMINE 30 MG: 30 INJECTION, SOLUTION INTRAMUSCULAR; INTRAVENOUS at 21:51

## 2023-12-27 RX ADMIN — HYDROMORPHONE HYDROCHLORIDE 0.4 MG: 1 INJECTION, SOLUTION INTRAMUSCULAR; INTRAVENOUS; SUBCUTANEOUS at 11:21

## 2023-12-27 RX ADMIN — SENNOSIDES 8.6 MG: 8.6 TABLET, FILM COATED ORAL at 12:47

## 2023-12-27 RX ADMIN — KETOROLAC TROMETHAMINE 30 MG: 30 INJECTION, SOLUTION INTRAMUSCULAR; INTRAVENOUS at 05:44

## 2023-12-27 RX ADMIN — SODIUM CHLORIDE, POTASSIUM CHLORIDE, SODIUM LACTATE AND CALCIUM CHLORIDE 100 ML/HR: 600; 310; 30; 20 INJECTION, SOLUTION INTRAVENOUS at 12:49

## 2023-12-27 RX ADMIN — CALCIUM CARBONATE (ANTACID) CHEW TAB 500 MG 500 MG: 500 CHEW TAB at 08:48

## 2023-12-27 RX ADMIN — DOCUSATE SODIUM 100 MG: 100 CAPSULE, LIQUID FILLED ORAL at 08:49

## 2023-12-27 RX ADMIN — HYDROMORPHONE HYDROCHLORIDE 0.4 MG: 1 INJECTION, SOLUTION INTRAMUSCULAR; INTRAVENOUS; SUBCUTANEOUS at 21:51

## 2023-12-27 RX ADMIN — HYDROMORPHONE HYDROCHLORIDE 0.4 MG: 1 INJECTION, SOLUTION INTRAMUSCULAR; INTRAVENOUS; SUBCUTANEOUS at 04:05

## 2023-12-27 RX ADMIN — KETOROLAC TROMETHAMINE 30 MG: 30 INJECTION, SOLUTION INTRAMUSCULAR; INTRAVENOUS at 17:35

## 2023-12-27 RX ADMIN — AMOXICILLIN 250 MG: 250 CAPSULE ORAL at 01:20

## 2023-12-27 RX ADMIN — FOLIC ACID 1 MG: 1 TABLET ORAL at 08:45

## 2023-12-27 RX ADMIN — HYDROXYUREA 2000 MG: 500 CAPSULE ORAL at 08:45

## 2023-12-27 RX ADMIN — SENNOSIDES 8.6 MG: 8.6 TABLET, FILM COATED ORAL at 21:51

## 2023-12-27 RX ADMIN — PANTOPRAZOLE SODIUM 40 MG: 40 TABLET, DELAYED RELEASE ORAL at 05:43

## 2023-12-27 ASSESSMENT — PAIN - FUNCTIONAL ASSESSMENT
PAIN_FUNCTIONAL_ASSESSMENT: 0-10

## 2023-12-27 ASSESSMENT — PAIN SCALES - GENERAL
PAINLEVEL_OUTOF10: 5 - MODERATE PAIN
PAINLEVEL_OUTOF10: 6
PAINLEVEL_OUTOF10: 5 - MODERATE PAIN
PAINLEVEL_OUTOF10: 5 - MODERATE PAIN
PAINLEVEL_OUTOF10: 7
PAINLEVEL_OUTOF10: 5 - MODERATE PAIN

## 2023-12-27 NOTE — H&P
History Of Present Illness  24 yo M, PMH sickle cell disease (HbSS) and intrapulmonary shunt presenting with flu like symptoms and acute pain. Patient initially presented to outpatient clinic on 12/21 for fever, sore throat, body aches and cough. Workup included strep test and covid/flu swap which was positive for influenza A. Today feels like he is in a pain crisis. He states he has not had one of these in many years. Primarily endorsing lower back pain which is consistent w/ prior pain crises. Feels the dilauded he received on arrival to the ED helped significantly. Flu sxs are improving, fevers or chills, N/V/D. Endorses mild cough, chest pain that is located centrally and radiating to his back. Denies dyspnea.  Has had poor PO intake the past several days.     In the ED  - Vitals:   T 36.8, HR 93, /63, RR 19, SpO2 95% on RA  - Labs:   CBC: WBC 13.6, Hgb 9.1, plt 144   BMP: Na 140, K 4.0, Cl 105, HCO3 27, BUN 9, Cr 0.75, glu 118   LFT: Ca 9.2, tprot 8.1, alb 4.2, alkphos 33, AST 59, ALT 33, tbili 2.6   LDH: 701   Covid/Flu A/Flu B: negative    - Imaging:  CXR (12/26)  IMPRESSION:  No evidence of acute cardiopulmonary process.    EKG: NSR, rate 88, no TWI, no ST changes compared to prior       In the ED, received 2L IVF, 30 mg IV ketorolac, 1 mg IV dilaudid and Tamiflu.     PMH: as above  SurgHx: vitreous hemorrhage  Allergies: ceftriaxone  SocHx: denies smoking, alcohol, drug use, college student   FamHx: not pertinent     Home Medications @ 10/2023:  Medication Sig    acetaminophen (Tylenol) 500 mg tablet Take 1-2 tablets (500-1,000 mg) by mouth once daily as needed.    naproxen (Naprosyn) 500 mg tablet Take by mouth.       May take one Naproxen with one tylenol for pain if tylenol alone doesn't work for mild to moderate sickle cell pain    Advair Diskus 250-50 mcg/dose diskus inhaler Inhale 1 puff every 12 hours.    albuterol 90 mcg/actuation inhaler Inhale 1-2 puffs  EVERY 4 TO 6 HOURS if needed for  "wheezing..    amoxicillin (Amoxil) 250 mg capsule TAKE 1 CAPSULE BY MOUTH EVERY 12 HOURS    calcium carbonate (Tums) 200 mg calcium chewable tablet Chew 1 tablet (500 mg) if needed.    ergocalciferol (Vitamin D-2) 1.25 MG (35771 UT) capsule      folic acid (Folvite) 1 mg tablet TAKE 1 TABLLET BY MOUTH ONCE A DAY    hydroxyurea (Hydrea) 500 mg capsule TAKE 4 CAPSULES BY MOUTH ONCE DAILY    melatonin 5 mg tablet      naloxone (Narcan) 4 mg/0.1 mL nasal spray INSTILL 1 SPRAY IN ONE NOSTRIL AS NEEDED FOR ACCIDENTAL OPIOID OVERDOSE; REPEAT WITH SECOND DOSE IN 5 MINUTES IF NO RESPONSE.    oxyCODONE (Roxicodone) 10 mg immediate release tablet TAKE 1 TABLET BY MOUTH EVERY 6 HOURS     Review of Systems   12 pt ROS complete and negative unless stated above.    Physical Exam  Constitutional: lying in bed, appears uncomfortable   HEENT: sclerae anicteric, EOM grossly intact  CV: RRR, no murmurs noted  Pulm: CTAB, no increased WOB  GI: abd soft, NT, ND  Skin: warm and dry  Ext: bilateral LE without pitting edema   Neuro: alert and conversant  Psych: affect appropriate    Last Recorded Vitals  Blood pressure 130/73, pulse 87, temperature 36.8 °C (98.2 °F), temperature source Oral, resp. rate 18, height 1.88 m (6' 2\"), weight 77.1 kg (170 lb), SpO2 94 %.    Relevant Results  Results for orders placed or performed during the hospital encounter of 12/26/23 (from the past 24 hour(s))   CBC and Auto Differential   Result Value Ref Range    WBC 13.6 (H) 4.4 - 11.3 x10*3/uL    nRBC 18.8 (H) 0.0 - 0.0 /100 WBCs    RBC 2.22 (L) 4.50 - 5.90 x10*6/uL    Hemoglobin 9.1 (L) 13.5 - 17.5 g/dL    Hematocrit 23.4 (L) 41.0 - 52.0 %     (H) 80 - 100 fL    MCH 41.0 (H) 26.0 - 34.0 pg    MCHC 38.9 (H) 32.0 - 36.0 g/dL    RDW 21.8 (H) 11.5 - 14.5 %    Platelets 144 (L) 150 - 450 x10*3/uL    Neutrophils % 67.3 40.0 - 80.0 %    Immature Granulocytes %, Automated 3.3 (H) 0.0 - 0.9 %    Lymphocytes % 22.9 13.0 - 44.0 %    Monocytes % 5.8 2.0 - 10.0 " %    Eosinophils % 0.1 0.0 - 6.0 %    Basophils % 0.6 0.0 - 2.0 %    Neutrophils Absolute 9.16 (H) 1.20 - 7.70 x10*3/uL    Immature Granulocytes Absolute, Automated 0.45 0.00 - 0.70 x10*3/uL    Lymphocytes Absolute 3.12 1.20 - 4.80 x10*3/uL    Monocytes Absolute 0.79 0.10 - 1.00 x10*3/uL    Eosinophils Absolute 0.01 0.00 - 0.70 x10*3/uL    Basophils Absolute 0.08 0.00 - 0.10 x10*3/uL   Comprehensive metabolic panel   Result Value Ref Range    Glucose 118 (H) 74 - 99 mg/dL    Sodium 140 136 - 145 mmol/L    Potassium 4.0 3.5 - 5.3 mmol/L    Chloride 105 98 - 107 mmol/L    Bicarbonate 27 21 - 32 mmol/L    Anion Gap 12 10 - 20 mmol/L    Urea Nitrogen 9 6 - 23 mg/dL    Creatinine 0.75 0.50 - 1.30 mg/dL    eGFR >90 >60 mL/min/1.73m*2    Calcium 9.2 8.6 - 10.6 mg/dL    Albumin 4.2 3.4 - 5.0 g/dL    Alkaline Phosphatase 98 33 - 120 U/L    Total Protein 8.1 6.4 - 8.2 g/dL    AST 59 (H) 9 - 39 U/L    Bilirubin, Total 2.6 (H) 0.0 - 1.2 mg/dL    ALT 33 10 - 52 U/L   Morphology   Result Value Ref Range    RBC Morphology See Below    LDH, Lactate dehydrogenase   Result Value Ref Range     (H) 84 - 246 U/L   Reticulocytes   Result Value Ref Range    Retic % 6.8 (H) 0.5 - 2.0 %    Retic Absolute 0.153 (H) 0.022 - 0.118 x10*6/uL    Reticulocyte Hemoglobin 39 (H) 28 - 38 pg    Immature Retic fraction 42.1 (H) <=16.0 %   Sars-CoV-2 and Influenza A/B PCR   Result Value Ref Range    Flu A Result Not Detected Not Detected    Flu B Result Not Detected Not Detected    Coronavirus 2019, PCR Not Detected Not Detected   ECG 12 lead   Result Value Ref Range    Ventricular Rate 88 BPM    Atrial Rate 88 BPM    NM Interval 130 ms    QRS Duration 80 ms    QT Interval 374 ms    QTC Calculation(Bazett) 452 ms    P Axis 77 degrees    R Axis 70 degrees    T Axis 28 degrees    QRS Count 15 beats    Q Onset 217 ms    P Onset 152 ms    P Offset 192 ms    T Offset 404 ms    QTC Fredericia 425 ms   Type and Screen   Result Value Ref Range    ABO  TYPE O     Rh TYPE POS     ANTIBODY SCREEN NEG    CBC and Auto Differential   Result Value Ref Range    WBC 12.9 (H) 4.4 - 11.3 x10*3/uL    nRBC 25.5 (H) 0.0 - 0.0 /100 WBCs    RBC 2.13 (L) 4.50 - 5.90 x10*6/uL    Hemoglobin 9.0 (L) 13.5 - 17.5 g/dL    Hematocrit 22.8 (L) 41.0 - 52.0 %     (H) 80 - 100 fL    MCH 42.3 (H) 26.0 - 34.0 pg    MCHC 39.5 (H) 32.0 - 36.0 g/dL    RDW 22.6 (H) 11.5 - 14.5 %    Platelets 169 150 - 450 x10*3/uL    Neutrophils % 64.7 40.0 - 80.0 %    Immature Granulocytes %, Automated 1.9 (H) 0.0 - 0.9 %    Lymphocytes % 22.8 13.0 - 44.0 %    Monocytes % 10.0 2.0 - 10.0 %    Eosinophils % 0.3 0.0 - 6.0 %    Basophils % 0.3 0.0 - 2.0 %    Neutrophils Absolute 8.32 (H) 1.20 - 7.70 x10*3/uL    Immature Granulocytes Absolute, Automated 0.25 0.00 - 0.70 x10*3/uL    Lymphocytes Absolute 2.94 1.20 - 4.80 x10*3/uL    Monocytes Absolute 1.29 (H) 0.10 - 1.00 x10*3/uL    Eosinophils Absolute 0.04 0.00 - 0.70 x10*3/uL    Basophils Absolute 0.04 0.00 - 0.10 x10*3/uL   Comprehensive Metabolic Panel   Result Value Ref Range    Glucose 128 (H) 74 - 99 mg/dL    Sodium 138 136 - 145 mmol/L    Potassium 4.8 3.5 - 5.3 mmol/L    Chloride 106 98 - 107 mmol/L    Bicarbonate 26 21 - 32 mmol/L    Anion Gap 11 10 - 20 mmol/L    Urea Nitrogen 10 6 - 23 mg/dL    Creatinine 0.75 0.50 - 1.30 mg/dL    eGFR >90 >60 mL/min/1.73m*2    Calcium 9.1 8.6 - 10.6 mg/dL    Albumin 3.9 3.4 - 5.0 g/dL    Alkaline Phosphatase 85 33 - 120 U/L    Total Protein 7.2 6.4 - 8.2 g/dL    AST 60 (H) 9 - 39 U/L    Bilirubin, Total 3.1 (H) 0.0 - 1.2 mg/dL    ALT 28 10 - 52 U/L   Lactate Dehydrogenase   Result Value Ref Range     (H) 84 - 246 U/L   Reticulocytes   Result Value Ref Range    Retic % 10.1 (H) 0.5 - 2.0 %    Retic Absolute 0.215 (H) 0.022 - 0.118 x10*6/uL    Reticulocyte Hemoglobin 37 28 - 38 pg    Immature Retic fraction 28.9 (H) <=16.0 %        Assessment/Plan   Principal Problem:    Sickle cell pain crisis  (CMS/[facility])  Active Problems:    Flu      22 yo M, PMH sickle cell disease (HbSS) and intrapulmonary shunt presenting with flu like symptoms and acute pain. Patient rarely admitted for acute pain crises. Will manage supportively. Flu symptoms largely resolved at this point but will continue supportive care.     #HgbSS disease w/ pain crisis   -follows w/ Dr Nelson outpatient  -pain plan & oarrs reviewed  -oral Tylenol PRN for mild/mod pain  -oral oxy 10 mg every 4-6 hours PRN severe pain   -toradol 30 mg IV q6h PRN for moderate pain w/ PPI ppx  -dilaudid 0.4 mg q3h severe break through   -hgb 9.1 (baseline 9-10)  -LDH elevated at 701 (baseline 200-300)  -hgb ID pending  -cont home hydrea   -cont folic acid   -cont amoxicillin ppx   -bowel regimen (PRN doc senna/miralax)    #influenza A  -tested positive outpatient on 12/21   -negative here for covid and flu  -full respiratory viral panel pending  -supportive care  -IVF @ 100 ccs    #leukocytosis  -mildly elevated at 13  -low suspicion for infection   -likely reactive  -cont to monitor     #small airway disease  -cont home inhalers       N: regular  A: PIV  DVT ppx: lovenox  GI ppx: PPI    Code Status: FULL CODE (confirmed on admission)  Surrogate Medical Decision-maker: Sulam Warner [phone]        Kamron Flynn MD

## 2023-12-27 NOTE — PROGRESS NOTES
"Yakov Jimenez is a 23 y.o. male on day 0 of admission presenting with Sickle cell pain crisis (CMS/HCC).    Subjective   Feeling well this morning, says the IVP dilaudid is helping his pain a lot. He is drinking lots of water. He does not have any abdominal pain or fullness. LBM two days ago. Denies any HA, dizziness/lightheadedness, fevers/chills, cough, congestion, rhinorrhea, sore throat, SOB, CP, palpitations, abdominal pain, n/v/d/c, melena, dysuria, urgency/frequency, hematuria, numbness/tingling, bruising/bleeding or rashes. Denies any sick contacts. ROS otherwise negative.         Objective     Physical Exam  Vitals reviewed.   Constitutional:       Appearance: Normal appearance.   HENT:      Head: Normocephalic and atraumatic.      Nose: Nose normal.      Mouth/Throat:      Mouth: Mucous membranes are moist.      Pharynx: Oropharynx is clear.   Eyes:      Extraocular Movements: Extraocular movements intact.      Pupils: Pupils are equal, round, and reactive to light.   Cardiovascular:      Rate and Rhythm: Normal rate and regular rhythm.      Pulses: Normal pulses.      Heart sounds: Normal heart sounds.   Pulmonary:      Effort: Pulmonary effort is normal.      Breath sounds: Normal breath sounds.   Abdominal:      General: Bowel sounds are normal.      Palpations: Abdomen is soft.      Tenderness: There is no abdominal tenderness.   Musculoskeletal:         General: Normal range of motion.   Skin:     General: Skin is warm.   Neurological:      General: No focal deficit present.      Mental Status: He is alert and oriented to person, place, and time. Mental status is at baseline.   Psychiatric:         Mood and Affect: Mood normal.         Behavior: Behavior normal.       Last Recorded Vitals  Blood pressure 137/80, pulse 93, temperature 37.8 °C (100 °F), temperature source Temporal, resp. rate 20, height 1.88 m (6' 2\"), weight 77.1 kg (170 lb), SpO2 99 %.    Assessment/Plan   Principal Problem:    " Sickle cell pain crisis (CMS/HCC)  Active Problems:    Flu    Yakov Jimenez is a 24 yo male with PMH of sickle cell disease (HbSS, on hydrea) and intrapulmonary shunt who presented to the ED with flu like symptoms and acute pain typical of his sickle cell disease. He tested positive for influenza A (12/21) as outpatient, and his symptoms have improved since then, but now with worsening pain. Admitted for pain management.      #HgbSS disease w/ pain crisis   - crisis likely in setting of recent influenza A   - follows w/ Dr. Nelson outpatient, very rarely admitted for crisis   - OARRS reviewed- no aberrant behavior noted   - no active careplan available   - 12/26- current continue dilaudid 0.4mg IVP q2h PRN + oxycodone 10mg q6h PRN   - 12/27 start toradol 30mg IVP q6h sched with PPI support x 3 days   - hgb at baseline (BL ~ 9), no indication for simple transfusion at this time   - CXR 12/26  without acute process   - LDH elevated on admission (BL ~ 350) - 1043 (12/27) but without abdominal pain; bili elevated (BL ~1.5), 4.2 (12/27) - likely both elevated in setting of active crisis, but if develops abdominal pain or worsening labs would consider RUQ U/S   - continue LR @ 100ml/hr in setting of active hemolysis   - hgb S 85% (12/26)   - home hydrea 2000mg daily held 12/27 in setting of active infection-  indicating compliance   - continue home folic acid and amoxicillin ppx   - bowel regimen for opioid induced constipation   - Utox 12/27 WNL     # influenza A  - 12/21 as outpatient tested + for influenza A  - COVID and flu A/B neg on admit  - s/p tamiflu in ED 12/26  - RSV panel pending 12/26  - mild leukocytosis of 14K noted (12/27), likely reactive     #small airway disease  -cont home inhalers     PPX: lovenox, encourage ambulation, PPI while toradol     DISPO  - FULL CODE, confirmed on admission  - discharge home no needs pending improvement in pain and labs  - Mom Alana [phone]  - FUVs Dr. Nelson  Heme 1/3       I spent > 60 minutes in the professional and overall care of this patient.      Julita Renee, APRN-CNP

## 2023-12-27 NOTE — PROGRESS NOTES
Pharmacy Medication History Review    Yakov Jimenez is a 23 y.o. male admitted for Sickle cell pain crisis (CMS/[facility]). Pharmacy reviewed the patient's [id number] medications and allergies for accuracy.    The list below reflects the updated PTA list. Comments regarding how patient may be taking medications differently can be found in the Admit Orders Activity  Prior to Admission Medications   Prescriptions Last Dose Informant Patient Reported? Taking?   Advair Diskus 250-50 mcg/dose diskus inhaler 12/26/2023 Self No Yes   Sig: Inhale 1 puff every 12 hours.   acetaminophen (Tylenol) 500 mg tablet Past Week Self Yes PRN   Sig: Take 1-2 tablets (500-1,000 mg) by mouth once daily as needed.   albuterol 90 mcg/actuation inhaler 12/26/2023 Self No PRN   Sig: Inhale 1-2 puffs  EVERY 4 TO 6 HOURS if needed for wheezing..   amoxicillin (Amoxil) 250 mg capsule 12/26/2023 Self No Yes   Sig: TAKE 1 CAPSULE BY MOUTH EVERY 12 HOURS   calcium carbonate (Tums) 200 mg calcium chewable tablet Unknown Self Yes PRN   Sig: Chew 1 tablet (500 mg) if needed.   folic acid (Folvite) 1 mg tablet 12/26/2023 Self No Yes   Sig: TAKE 1 TABLLET BY MOUTH ONCE A DAY   hydroxyurea (Hydrea) 500 mg capsule 12/26/2023 Self No Yes   Sig: TAKE 4 CAPSULES BY MOUTH ONCE DAILY   meclizine (Antivert) 25 mg tablet Not Taking Self No No   Sig: Take 1 tablet (25 mg) by mouth 3 times a day as needed for dizziness for up to 10 doses.   Patient not taking: Reported on 12/27/2023   naloxone (Narcan) 4 mg/0.1 mL nasal spray  Self No PRN   Sig: INSTILL 1 SPRAY IN ONE NOSTRIL AS NEEDED FOR ACCIDENTAL OPIOID OVERDOSE; REPEAT WITH SECOND DOSE IN 5 MINUTES IF NO RESPONSE.   naproxen (Naprosyn) 500 mg tablet Past Week Self Yes PRN   Sig: Take by mouth.  May take one Naproxen with one tylenol for pain if tylenol alone doesn't work for mild to moderate sickle cell pain   oxyCODONE (Roxicodone) 10 mg immediate release tablet 12/27/2023 at am Self Yes PRN   Sig: Take  "1 tablet (10 mg) by mouth every 6 hours if needed for severe pain (7 - 10).      Facility-Administered Medications: None        The list below reflects the updated allergy list. Please review each documented allergy for additional clarification and justification.  Allergies  Reviewed by Jose Ace RN on 12/26/2023        Severity Reactions Comments    Ceftriaxone High Anaphylaxis             Patient accepts M2B at discharge. Pharmacy has been updated to [facility].    Sources used to complete the med history include   OARRS, Surescripts fill history, care everywhere  Patient interview, patient is a good historian, and listed all medication names and doses    Below are additional concerns with the patient's PTA list.  Patient reports taking both capsules of amoxicillin once daily      Kelli Castellano, PharmD, [facility] PGY1 Pharmacy Resident  [facility]  Please reach out via [employer] Secure Chat for questions, or if no response call [phone] or [employer] \"MedRec\"    "

## 2023-12-28 LAB
ALBUMIN SERPL BCP-MCNC: 3.6 G/DL (ref 3.4–5)
ALP SERPL-CCNC: 174 U/L (ref 33–120)
ALT SERPL W P-5'-P-CCNC: 22 U/L (ref 10–52)
ANION GAP SERPL CALC-SCNC: 11 MMOL/L (ref 10–20)
AST SERPL W P-5'-P-CCNC: 47 U/L (ref 9–39)
BASO STIPL BLD QL SMEAR: PRESENT
BASOPHILS # BLD AUTO: 0.03 X10*3/UL (ref 0–0.1)
BASOPHILS NFR BLD AUTO: 0.3 %
BILIRUB SERPL-MCNC: 3.8 MG/DL (ref 0–1.2)
BUN SERPL-MCNC: 8 MG/DL (ref 6–23)
CALCIUM SERPL-MCNC: 9.1 MG/DL (ref 8.6–10.6)
CHLORIDE SERPL-SCNC: 105 MMOL/L (ref 98–107)
CO2 SERPL-SCNC: 29 MMOL/L (ref 21–32)
CREAT SERPL-MCNC: 0.74 MG/DL (ref 0.5–1.3)
EOSINOPHIL # BLD AUTO: 0.01 X10*3/UL (ref 0–0.7)
EOSINOPHIL NFR BLD AUTO: 0.1 %
ERYTHROCYTE [DISTWIDTH] IN BLOOD BY AUTOMATED COUNT: 22.9 % (ref 11.5–14.5)
GFR SERPL CREATININE-BSD FRML MDRD: >90 ML/MIN/1.73M*2
GLUCOSE SERPL-MCNC: 87 MG/DL (ref 74–99)
HCT VFR BLD AUTO: 23.2 % (ref 41–52)
HEMOGLOBIN A2: 4.1 % (ref 2–3.5)
HEMOGLOBIN A2: 4.2 % (ref 2–3.5)
HEMOGLOBIN F: 8.9 % (ref 0–2)
HEMOGLOBIN F: 9 % (ref 0–2)
HEMOGLOBIN IDENTIFICATION INTERPRETATION: ABNORMAL
HEMOGLOBIN IDENTIFICATION INTERPRETATION: ABNORMAL
HEMOGLOBIN S: 85 %
HEMOGLOBIN S: 85.5 %
HGB BLD-MCNC: 8.4 G/DL (ref 13.5–17.5)
HGB RETIC QN: 36 PG (ref 28–38)
HOWELL-JOLLY BOD BLD QL SMEAR: PRESENT
IMM GRANULOCYTES # BLD AUTO: 0.14 X10*3/UL (ref 0–0.7)
IMM GRANULOCYTES NFR BLD AUTO: 1.3 % (ref 0–0.9)
IMMATURE RETIC FRACTION: 43.8 %
LDH SERPL L TO P-CCNC: 1098 U/L (ref 84–246)
LYMPHOCYTES # BLD AUTO: 1.94 X10*3/UL (ref 1.2–4.8)
LYMPHOCYTES NFR BLD AUTO: 17.7 %
MCH RBC QN AUTO: 41.2 PG (ref 26–34)
MCHC RBC AUTO-ENTMCNC: 36.2 G/DL (ref 32–36)
MCV RBC AUTO: 114 FL (ref 80–100)
MONOCYTES # BLD AUTO: 0.65 X10*3/UL (ref 0.1–1)
MONOCYTES NFR BLD AUTO: 5.9 %
NEUTROPHILS # BLD AUTO: 8.21 X10*3/UL (ref 1.2–7.7)
NEUTROPHILS NFR BLD AUTO: 74.7 %
NRBC BLD-RTO: 38.2 /100 WBCS (ref 0–0)
PATH REVIEW-HGB IDENTIFICATION: ABNORMAL
PATH REVIEW-HGB IDENTIFICATION: ABNORMAL
PLATELET # BLD AUTO: 272 X10*3/UL (ref 150–450)
POLYCHROMASIA BLD QL SMEAR: NORMAL
POTASSIUM SERPL-SCNC: 3.6 MMOL/L (ref 3.5–5.3)
PROT SERPL-MCNC: 7.1 G/DL (ref 6.4–8.2)
RBC # BLD AUTO: 2.04 X10*6/UL (ref 4.5–5.9)
RBC MORPH BLD: NORMAL
RETICS #: 0.21 X10*6/UL (ref 0.02–0.12)
RETICS/RBC NFR AUTO: 10.1 % (ref 0.5–2)
SICKLE CELLS BLD QL SMEAR: NORMAL
SODIUM SERPL-SCNC: 141 MMOL/L (ref 136–145)
TARGETS BLD QL SMEAR: NORMAL
WBC # BLD AUTO: 11 X10*3/UL (ref 4.4–11.3)

## 2023-12-28 PROCEDURE — 99233 SBSQ HOSP IP/OBS HIGH 50: CPT | Performed by: INTERNAL MEDICINE

## 2023-12-28 PROCEDURE — 2500000004 HC RX 250 GENERAL PHARMACY W/ HCPCS (ALT 636 FOR OP/ED): Performed by: STUDENT IN AN ORGANIZED HEALTH CARE EDUCATION/TRAINING PROGRAM

## 2023-12-28 PROCEDURE — 1170000001 HC PRIVATE ONCOLOGY ROOM DAILY

## 2023-12-28 PROCEDURE — 80053 COMPREHEN METABOLIC PANEL: CPT | Performed by: STUDENT IN AN ORGANIZED HEALTH CARE EDUCATION/TRAINING PROGRAM

## 2023-12-28 PROCEDURE — 2500000004 HC RX 250 GENERAL PHARMACY W/ HCPCS (ALT 636 FOR OP/ED): Performed by: NURSE PRACTITIONER

## 2023-12-28 PROCEDURE — 99233 SBSQ HOSP IP/OBS HIGH 50: CPT

## 2023-12-28 PROCEDURE — 2500000002 HC RX 250 W HCPCS SELF ADMINISTERED DRUGS (ALT 637 FOR MEDICARE OP, ALT 636 FOR OP/ED): Performed by: STUDENT IN AN ORGANIZED HEALTH CARE EDUCATION/TRAINING PROGRAM

## 2023-12-28 PROCEDURE — 83615 LACTATE (LD) (LDH) ENZYME: CPT | Performed by: NURSE PRACTITIONER

## 2023-12-28 PROCEDURE — 85025 COMPLETE CBC W/AUTO DIFF WBC: CPT | Performed by: STUDENT IN AN ORGANIZED HEALTH CARE EDUCATION/TRAINING PROGRAM

## 2023-12-28 PROCEDURE — 85045 AUTOMATED RETICULOCYTE COUNT: CPT | Performed by: NURSE PRACTITIONER

## 2023-12-28 PROCEDURE — 96372 THER/PROPH/DIAG INJ SC/IM: CPT | Performed by: STUDENT IN AN ORGANIZED HEALTH CARE EDUCATION/TRAINING PROGRAM

## 2023-12-28 PROCEDURE — 36415 COLL VENOUS BLD VENIPUNCTURE: CPT | Performed by: STUDENT IN AN ORGANIZED HEALTH CARE EDUCATION/TRAINING PROGRAM

## 2023-12-28 PROCEDURE — 2500000001 HC RX 250 WO HCPCS SELF ADMINISTERED DRUGS (ALT 637 FOR MEDICARE OP): Performed by: STUDENT IN AN ORGANIZED HEALTH CARE EDUCATION/TRAINING PROGRAM

## 2023-12-28 PROCEDURE — 2500000004 HC RX 250 GENERAL PHARMACY W/ HCPCS (ALT 636 FOR OP/ED)

## 2023-12-28 PROCEDURE — 2500000001 HC RX 250 WO HCPCS SELF ADMINISTERED DRUGS (ALT 637 FOR MEDICARE OP): Performed by: NURSE PRACTITIONER

## 2023-12-28 RX ORDER — ONDANSETRON HYDROCHLORIDE 2 MG/ML
4 INJECTION, SOLUTION INTRAVENOUS ONCE
Status: COMPLETED | OUTPATIENT
Start: 2023-12-28 | End: 2023-12-28

## 2023-12-28 RX ADMIN — FLUTICASONE FUROATE AND VILANTEROL TRIFENATATE 1 PUFF: 200; 25 POWDER RESPIRATORY (INHALATION) at 08:47

## 2023-12-28 RX ADMIN — SODIUM CHLORIDE, POTASSIUM CHLORIDE, SODIUM LACTATE AND CALCIUM CHLORIDE 100 ML/HR: 600; 310; 30; 20 INJECTION, SOLUTION INTRAVENOUS at 20:48

## 2023-12-28 RX ADMIN — SODIUM CHLORIDE, POTASSIUM CHLORIDE, SODIUM LACTATE AND CALCIUM CHLORIDE 100 ML/HR: 600; 310; 30; 20 INJECTION, SOLUTION INTRAVENOUS at 11:01

## 2023-12-28 RX ADMIN — KETOROLAC TROMETHAMINE 30 MG: 30 INJECTION, SOLUTION INTRAMUSCULAR; INTRAVENOUS at 06:08

## 2023-12-28 RX ADMIN — SENNOSIDES 8.6 MG: 8.6 TABLET, FILM COATED ORAL at 20:48

## 2023-12-28 RX ADMIN — KETOROLAC TROMETHAMINE 30 MG: 30 INJECTION, SOLUTION INTRAMUSCULAR; INTRAVENOUS at 20:48

## 2023-12-28 RX ADMIN — FOLIC ACID 1 MG: 1 TABLET ORAL at 08:48

## 2023-12-28 RX ADMIN — KETOROLAC TROMETHAMINE 30 MG: 30 INJECTION, SOLUTION INTRAMUSCULAR; INTRAVENOUS at 17:07

## 2023-12-28 RX ADMIN — SODIUM CHLORIDE, POTASSIUM CHLORIDE, SODIUM LACTATE AND CALCIUM CHLORIDE 100 ML/HR: 600; 310; 30; 20 INJECTION, SOLUTION INTRAVENOUS at 00:32

## 2023-12-28 RX ADMIN — AMOXICILLIN 250 MG: 250 CAPSULE ORAL at 00:32

## 2023-12-28 RX ADMIN — AMOXICILLIN 250 MG: 250 CAPSULE ORAL at 12:09

## 2023-12-28 RX ADMIN — OXYCODONE HYDROCHLORIDE 10 MG: 5 TABLET ORAL at 06:08

## 2023-12-28 RX ADMIN — OXYCODONE HYDROCHLORIDE 10 MG: 5 TABLET ORAL at 12:09

## 2023-12-28 RX ADMIN — ONDANSETRON 4 MG: 2 INJECTION INTRAMUSCULAR; INTRAVENOUS at 04:31

## 2023-12-28 RX ADMIN — SENNOSIDES 8.6 MG: 8.6 TABLET, FILM COATED ORAL at 08:48

## 2023-12-28 RX ADMIN — ENOXAPARIN SODIUM 40 MG: 100 INJECTION SUBCUTANEOUS at 08:47

## 2023-12-28 RX ADMIN — KETOROLAC TROMETHAMINE 30 MG: 30 INJECTION, SOLUTION INTRAMUSCULAR; INTRAVENOUS at 12:09

## 2023-12-28 RX ADMIN — PANTOPRAZOLE SODIUM 40 MG: 40 TABLET, DELAYED RELEASE ORAL at 06:08

## 2023-12-28 SDOH — SOCIAL STABILITY: SOCIAL INSECURITY: WERE YOU ABLE TO COMPLETE ALL THE BEHAVIORAL HEALTH SCREENINGS?: YES

## 2023-12-28 SDOH — SOCIAL STABILITY: SOCIAL INSECURITY: DO YOU FEEL ANYONE HAS EXPLOITED OR TAKEN ADVANTAGE OF YOU FINANCIALLY OR OF YOUR PERSONAL PROPERTY?: NO

## 2023-12-28 SDOH — SOCIAL STABILITY: SOCIAL INSECURITY: HAVE YOU HAD THOUGHTS OF HARMING ANYONE ELSE?: NO

## 2023-12-28 SDOH — SOCIAL STABILITY: SOCIAL INSECURITY: DOES ANYONE TRY TO KEEP YOU FROM HAVING/CONTACTING OTHER FRIENDS OR DOING THINGS OUTSIDE YOUR HOME?: NO

## 2023-12-28 SDOH — SOCIAL STABILITY: SOCIAL INSECURITY: ARE YOU OR HAVE YOU BEEN THREATENED OR ABUSED PHYSICALLY, EMOTIONALLY, OR SEXUALLY BY ANYONE?: NO

## 2023-12-28 SDOH — SOCIAL STABILITY: SOCIAL INSECURITY: ABUSE: ADULT

## 2023-12-28 SDOH — SOCIAL STABILITY: SOCIAL INSECURITY: HAS ANYONE EVER THREATENED TO HURT YOUR FAMILY OR YOUR PETS?: NO

## 2023-12-28 SDOH — SOCIAL STABILITY: SOCIAL INSECURITY: DO YOU FEEL UNSAFE GOING BACK TO THE PLACE WHERE YOU ARE LIVING?: NO

## 2023-12-28 SDOH — SOCIAL STABILITY: SOCIAL INSECURITY: ARE THERE ANY APPARENT SIGNS OF INJURIES/BEHAVIORS THAT COULD BE RELATED TO ABUSE/NEGLECT?: NO

## 2023-12-28 ASSESSMENT — COGNITIVE AND FUNCTIONAL STATUS - GENERAL
DAILY ACTIVITIY SCORE: 24
MOBILITY SCORE: 24
MOBILITY SCORE: 24
DAILY ACTIVITIY SCORE: 24
PATIENT BASELINE BEDBOUND: NO

## 2023-12-28 ASSESSMENT — ACTIVITIES OF DAILY LIVING (ADL)
PATIENT'S MEMORY ADEQUATE TO SAFELY COMPLETE DAILY ACTIVITIES?: YES
GROOMING: INDEPENDENT
HEARING - RIGHT EAR: FUNCTIONAL
BATHING: INDEPENDENT
JUDGMENT_ADEQUATE_SAFELY_COMPLETE_DAILY_ACTIVITIES: YES
ASSISTIVE_DEVICE: EYEGLASSES
FEEDING YOURSELF: INDEPENDENT
WALKS IN HOME: INDEPENDENT
HEARING - LEFT EAR: FUNCTIONAL
LACK_OF_TRANSPORTATION: NO
DRESSING YOURSELF: INDEPENDENT
ADEQUATE_TO_COMPLETE_ADL: YES
TOILETING: INDEPENDENT

## 2023-12-28 ASSESSMENT — LIFESTYLE VARIABLES
AUDIT-C TOTAL SCORE: 0
HOW OFTEN DO YOU HAVE 6 OR MORE DRINKS ON ONE OCCASION: NEVER
HOW MANY STANDARD DRINKS CONTAINING ALCOHOL DO YOU HAVE ON A TYPICAL DAY: PATIENT DOES NOT DRINK
HOW OFTEN DO YOU HAVE A DRINK CONTAINING ALCOHOL: NEVER
AUDIT-C TOTAL SCORE: 0
SKIP TO QUESTIONS 9-10: 1

## 2023-12-28 ASSESSMENT — PATIENT HEALTH QUESTIONNAIRE - PHQ9
2. FEELING DOWN, DEPRESSED OR HOPELESS: NOT AT ALL
SUM OF ALL RESPONSES TO PHQ9 QUESTIONS 1 & 2: 0
1. LITTLE INTEREST OR PLEASURE IN DOING THINGS: NOT AT ALL

## 2023-12-28 ASSESSMENT — PAIN - FUNCTIONAL ASSESSMENT
PAIN_FUNCTIONAL_ASSESSMENT: 0-10

## 2023-12-28 ASSESSMENT — PAIN SCALES - GENERAL
PAINLEVEL_OUTOF10: 4
PAINLEVEL_OUTOF10: 7
PAINLEVEL_OUTOF10: 2
PAINLEVEL_OUTOF10: 7

## 2023-12-28 NOTE — PROGRESS NOTES
"Yakov Jimenez is a 23 y.o. male on day 1 of admission presenting with Sickle cell pain crisis (CMS/HCC).    Subjective   Patient seen resting in bed. Reports significant improvement in pain this morning. Experiencing most of his pain in his lower back. Also continues to endorse a productive cough but denies any fevers/chills, shortness of breath or chest pain. Patient has not had a bowel movement since admission. Reports that he is taking miralax and senna and would not like to add anything additional today. Patient has been drinking and eating as able, notes some improvement in his appetite today.       Objective     Physical Exam  Constitutional:       Appearance: Normal appearance.   HENT:      Mouth/Throat:      Mouth: Mucous membranes are moist.   Eyes:      Pupils: Pupils are equal, round, and reactive to light.   Cardiovascular:      Rate and Rhythm: Normal rate and regular rhythm.   Pulmonary:      Effort: Pulmonary effort is normal.      Breath sounds: Normal breath sounds. No wheezing or rhonchi.   Abdominal:      General: Abdomen is flat. Bowel sounds are normal.      Palpations: Abdomen is soft.   Musculoskeletal:         General: Normal range of motion.      Cervical back: Normal range of motion and neck supple.   Skin:     General: Skin is warm.   Neurological:      General: No focal deficit present.      Mental Status: He is alert.   Psychiatric:         Mood and Affect: Mood normal.         Last Recorded Vitals  Blood pressure 119/68, pulse 103, temperature 37.3 °C (99.1 °F), resp. rate 18, height 1.88 m (6' 2\"), weight 78.5 kg (173 lb 1 oz), SpO2 94 %.  Intake/Output last 3 Shifts:  I/O last 3 completed shifts:  In: 383 (4.9 mL/kg) [I.V.:383 (4.9 mL/kg)]  Out: 1000 (12.7 mL/kg) [Urine:1000 (0.4 mL/kg/hr)]  Weight: 78.5 kg     Relevant Results                Scheduled medications  amoxicillin, 250 mg, oral, q12h  enoxaparin, 40 mg, subcutaneous, q24h EMIR  fluticasone furoate-vilanteroL, 1 puff, " inhalation, Daily  folic acid, 1 mg, oral, Daily  [Held by provider] hydroxyurea, 2,000 mg, oral, Daily  ketorolac, 30 mg, intravenous, 4x daily  pantoprazole, 40 mg, oral, Daily before breakfast  sennosides, 1 tablet, oral, BID      Continuous medications  lactated Ringer's, 100 mL/hr, Last Rate: 100 mL/hr (12/28/23 0032)      PRN medications  PRN medications: albuterol, calcium carbonate, HYDROmorphone, melatonin, oxyCODONE               Assessment/Plan   Principal Problem:    Sickle cell pain crisis (CMS/HCC)  Active Problems:    Flu    Yakov Jimenez is a 24 yo male with PMH of sickle cell disease (HbSS, on hydrea) and intrapulmonary shunt who presented to the ED with flu like symptoms and acute pain typical of his sickle cell disease. He tested positive for influenza A (12/21) as outpatient, and his symptoms have improved since then, but now with worsening pain. Patient with worsening Tbili and LDH on 12/27, started on LR @100mL/hour (12/17-current) in setting of active hemolysis. Discharge pending improvement of labs and pain.      #HgbSS disease w/ pain crisis   - crisis likely in setting of recent influenza A   - follows w/ Dr. Nelson outpatient, very rarely admitted for crisis   - OARRS reviewed- no aberrant behavior noted   - no active careplan available   - 12/26- current continue dilaudid 0.4mg IVP q2h PRN + oxycodone 10mg q6h PRN   - continue toradol 30mg IVP q6h sched with PPI support x 3 days (12/27-planned stop 12/30)   - hgb at baseline (BL ~ 9), no indication for simple transfusion at this time   - CXR (12/26) without acute cardiopulmonary process   - LDH elevated on admission (BL ~ 350) - 1098 (12/28) but without abdominal pain; bili elevated (BL ~1.5), 3.8 (12/28) and now down trending - likely both elevated in setting of active crisis, but if develops abdominal pain or worsening labs would consider RUQ U/S   - continue LR @ 100ml/hr in setting of active hemolysis (12/27-current)  - hgb S 85%  (12/26)   - home hydrea 2000mg daily held 12/27 in setting of active infection-  indicating compliance   - continue home folic acid and amoxicillin ppx   - bowel regimen for opioid induced constipation   - Utox 12/27 WNL     # influenza A  - 12/21 as outpatient tested + for influenza A  - COVID and flu A/B neg on admit  - s/p tamiflu in ED 12/26  - RSV panel pending 12/28, collected 12/27   - mild leukocytosis of 14K noted (12/27), likely reactive     # small airway disease  -cont home inhalers      PPX: lovenox, encourage ambulation, PPI while toradol      DISPO  - FULL CODE, confirmed on admission  - discharge home no needs pending improvement in pain and labs  - Mom Alana [phone]  - FUVs Dr. Leslie Gómez 1/3       I spent 60 minutes in the professional and overall care of this patient.    Assessment and Plan as above discussed with attending physician, Dr. Abel, PA-C

## 2023-12-28 NOTE — PROGRESS NOTES
Child Life Assessment:   Reason for Consult  Discipline: Child Life Specialist  Reason for Consult: Coping skill development/planning  Total Time Spent (min): 10 minutes    Patient Intervention(s)  Type of Intervention Performed: Healing environment interventions  Healing Environment Intervention(s): Opportunity for choice and control, Orientation to services, Coping skill development/planning      Session Details: Certified Child Life Specialist (CCLS) introduced child life services to patient and engaged in conversation regarding patient's coping with hospitalization and pain.  Patient shared that he felt much more comfortable today than when he was first admitted, though did take a stress ball as a coping tool.  Patient shared that he typically uses distraction when having pain and denied the need for additional resources.  Child life available if needed throughout patient's admission.    MARCOS Marti, CCLS  [employer] Secure Chat

## 2023-12-29 LAB
1OH-MIDAZOLAM UR CFM-MCNC: <25 NG/ML
6MAM UR CFM-MCNC: <25 NG/ML
7AMINOCLONAZEPAM UR CFM-MCNC: <25 NG/ML
A-OH ALPRAZ UR CFM-MCNC: <25 NG/ML
ADENOVIRUS RVP, VIRC: NOT DETECTED
ALBUMIN SERPL BCP-MCNC: 3.2 G/DL (ref 3.4–5)
ALP SERPL-CCNC: 138 U/L (ref 33–120)
ALPRAZ UR CFM-MCNC: <25 NG/ML
ALT SERPL W P-5'-P-CCNC: 18 U/L (ref 10–52)
ANION GAP SERPL CALC-SCNC: 10 MMOL/L (ref 10–20)
AST SERPL W P-5'-P-CCNC: 32 U/L (ref 9–39)
BASO STIPL BLD QL SMEAR: PRESENT
BASOPHILS # BLD AUTO: 0.02 X10*3/UL (ref 0–0.1)
BASOPHILS NFR BLD AUTO: 0.2 %
BILIRUB SERPL-MCNC: 3.1 MG/DL (ref 0–1.2)
BUN SERPL-MCNC: 7 MG/DL (ref 6–23)
CALCIUM SERPL-MCNC: 8.5 MG/DL (ref 8.6–10.6)
CHLORDIAZEP UR CFM-MCNC: <25 NG/ML
CHLORIDE SERPL-SCNC: 104 MMOL/L (ref 98–107)
CLONAZEPAM UR CFM-MCNC: <25 NG/ML
CO2 SERPL-SCNC: 29 MMOL/L (ref 21–32)
CODEINE UR CFM-MCNC: <50 NG/ML
CREAT SERPL-MCNC: 0.78 MG/DL (ref 0.5–1.3)
DIAZEPAM UR CFM-MCNC: <25 NG/ML
EDDP UR CFM-MCNC: <25 NG/ML
ENTEROVIRUS/RHINOVIRUS RVP, VIRC: NOT DETECTED
EOSINOPHIL # BLD AUTO: 0.1 X10*3/UL (ref 0–0.7)
EOSINOPHIL NFR BLD AUTO: 0.9 %
ERYTHROCYTE [DISTWIDTH] IN BLOOD BY AUTOMATED COUNT: 22.5 % (ref 11.5–14.5)
FENTANYL UR CFM-MCNC: <2.5 NG/ML
GFR SERPL CREATININE-BSD FRML MDRD: >90 ML/MIN/1.73M*2
GLUCOSE SERPL-MCNC: 81 MG/DL (ref 74–99)
HCT VFR BLD AUTO: 21.4 % (ref 41–52)
HGB BLD-MCNC: 7.6 G/DL (ref 13.5–17.5)
HGB RETIC QN: 33 PG (ref 28–38)
HOWELL-JOLLY BOD BLD QL SMEAR: PRESENT
HUMAN BOCAVIRUS RVP, VIRC: NOT DETECTED
HUMAN CORONAVIRUS RVP, VIRC: NOT DETECTED
HYDROCODONE CTO UR CFM-MCNC: <25 NG/ML
HYDROMORPHONE UR CFM-MCNC: 353 NG/ML
IMM GRANULOCYTES # BLD AUTO: 0.15 X10*3/UL (ref 0–0.7)
IMM GRANULOCYTES NFR BLD AUTO: 1.4 % (ref 0–0.9)
IMMATURE RETIC FRACTION: 34.4 %
INFLUENZA A , VIRC: NOT DETECTED
INFLUENZA A H1N1-09 , VIRC: NOT DETECTED
INFLUENZA B PCR, VIRC: NOT DETECTED
LDH SERPL L TO P-CCNC: 854 U/L (ref 84–246)
LORAZEPAM UR CFM-MCNC: <25 NG/ML
LYMPHOCYTES # BLD AUTO: 1.76 X10*3/UL (ref 1.2–4.8)
LYMPHOCYTES NFR BLD AUTO: 16.4 %
MCH RBC QN AUTO: 40 PG (ref 26–34)
MCHC RBC AUTO-ENTMCNC: 35.5 G/DL (ref 32–36)
MCV RBC AUTO: 113 FL (ref 80–100)
METAPNEUMOVIRUS , VIRC: NOT DETECTED
METHADONE UR CFM-MCNC: <25 NG/ML
MIDAZOLAM UR CFM-MCNC: <25 NG/ML
MONOCYTES # BLD AUTO: 0.73 X10*3/UL (ref 0.1–1)
MONOCYTES NFR BLD AUTO: 6.8 %
MORPHINE UR CFM-MCNC: <50 NG/ML
NEUTROPHILS # BLD AUTO: 7.94 X10*3/UL (ref 1.2–7.7)
NEUTROPHILS NFR BLD AUTO: 74.3 %
NORDIAZEPAM UR CFM-MCNC: <25 NG/ML
NORFENTANYL UR CFM-MCNC: <2.5 NG/ML
NORHYDROCODONE UR CFM-MCNC: <25 NG/ML
NOROXYCODONE UR CFM-MCNC: >1000 NG/ML
NORTRAMADOL UR-MCNC: <50 NG/ML
NRBC BLD-RTO: 38.6 /100 WBCS (ref 0–0)
OXAZEPAM UR CFM-MCNC: <25 NG/ML
OXYCODONE UR CFM-MCNC: 670 NG/ML
OXYMORPHONE UR CFM-MCNC: 552 NG/ML
PARAINFLUENZA PCR, VIRC: NOT DETECTED
PLATELET # BLD AUTO: 357 X10*3/UL (ref 150–450)
POLYCHROMASIA BLD QL SMEAR: NORMAL
POTASSIUM SERPL-SCNC: 3.4 MMOL/L (ref 3.5–5.3)
PROT SERPL-MCNC: 6.3 G/DL (ref 6.4–8.2)
RBC # BLD AUTO: 1.9 X10*6/UL (ref 4.5–5.9)
RBC MORPH BLD: NORMAL
RETICS #: 0.25 X10*6/UL (ref 0.02–0.12)
RETICS/RBC NFR AUTO: 13.1 % (ref 0.5–2)
RSV PCR, RVP, VIRC: NOT DETECTED
SICKLE CELLS BLD QL SMEAR: NORMAL
SODIUM SERPL-SCNC: 140 MMOL/L (ref 136–145)
TARGETS BLD QL SMEAR: NORMAL
TEMAZEPAM UR CFM-MCNC: <25 NG/ML
TRAMADOL UR CFM-MCNC: <50 NG/ML
WBC # BLD AUTO: 10.7 X10*3/UL (ref 4.4–11.3)
ZOLPIDEM UR CFM-MCNC: <25 NG/ML
ZOLPIDEM UR-MCNC: <25 NG/ML

## 2023-12-29 PROCEDURE — 36415 COLL VENOUS BLD VENIPUNCTURE: CPT | Performed by: STUDENT IN AN ORGANIZED HEALTH CARE EDUCATION/TRAINING PROGRAM

## 2023-12-29 PROCEDURE — 2500000002 HC RX 250 W HCPCS SELF ADMINISTERED DRUGS (ALT 637 FOR MEDICARE OP, ALT 636 FOR OP/ED): Performed by: STUDENT IN AN ORGANIZED HEALTH CARE EDUCATION/TRAINING PROGRAM

## 2023-12-29 PROCEDURE — 83615 LACTATE (LD) (LDH) ENZYME: CPT

## 2023-12-29 PROCEDURE — 85025 COMPLETE CBC W/AUTO DIFF WBC: CPT | Performed by: STUDENT IN AN ORGANIZED HEALTH CARE EDUCATION/TRAINING PROGRAM

## 2023-12-29 PROCEDURE — 2500000004 HC RX 250 GENERAL PHARMACY W/ HCPCS (ALT 636 FOR OP/ED): Performed by: NURSE PRACTITIONER

## 2023-12-29 PROCEDURE — 99233 SBSQ HOSP IP/OBS HIGH 50: CPT | Performed by: HOME HEALTH AIDE

## 2023-12-29 PROCEDURE — 96372 THER/PROPH/DIAG INJ SC/IM: CPT | Performed by: STUDENT IN AN ORGANIZED HEALTH CARE EDUCATION/TRAINING PROGRAM

## 2023-12-29 PROCEDURE — 2500000001 HC RX 250 WO HCPCS SELF ADMINISTERED DRUGS (ALT 637 FOR MEDICARE OP): Performed by: STUDENT IN AN ORGANIZED HEALTH CARE EDUCATION/TRAINING PROGRAM

## 2023-12-29 PROCEDURE — 2500000004 HC RX 250 GENERAL PHARMACY W/ HCPCS (ALT 636 FOR OP/ED): Performed by: HOME HEALTH AIDE

## 2023-12-29 PROCEDURE — 85045 AUTOMATED RETICULOCYTE COUNT: CPT

## 2023-12-29 PROCEDURE — 2500000004 HC RX 250 GENERAL PHARMACY W/ HCPCS (ALT 636 FOR OP/ED): Performed by: STUDENT IN AN ORGANIZED HEALTH CARE EDUCATION/TRAINING PROGRAM

## 2023-12-29 PROCEDURE — 1170000001 HC PRIVATE ONCOLOGY ROOM DAILY

## 2023-12-29 PROCEDURE — 85060 BLOOD SMEAR INTERPRETATION: CPT | Performed by: STUDENT IN AN ORGANIZED HEALTH CARE EDUCATION/TRAINING PROGRAM

## 2023-12-29 PROCEDURE — 80053 COMPREHEN METABOLIC PANEL: CPT | Performed by: STUDENT IN AN ORGANIZED HEALTH CARE EDUCATION/TRAINING PROGRAM

## 2023-12-29 PROCEDURE — 2500000001 HC RX 250 WO HCPCS SELF ADMINISTERED DRUGS (ALT 637 FOR MEDICARE OP): Performed by: NURSE PRACTITIONER

## 2023-12-29 PROCEDURE — 93010 ELECTROCARDIOGRAM REPORT: CPT | Performed by: INTERNAL MEDICINE

## 2023-12-29 RX ORDER — POTASSIUM CHLORIDE 20 MEQ/1
20 TABLET, EXTENDED RELEASE ORAL ONCE
Status: COMPLETED | OUTPATIENT
Start: 2023-12-29 | End: 2023-12-29

## 2023-12-29 RX ORDER — ACETAMINOPHEN 325 MG/1
650 TABLET ORAL ONCE
Status: COMPLETED | OUTPATIENT
Start: 2023-12-29 | End: 2023-12-29

## 2023-12-29 RX ADMIN — SODIUM CHLORIDE, POTASSIUM CHLORIDE, SODIUM LACTATE AND CALCIUM CHLORIDE 100 ML/HR: 600; 310; 30; 20 INJECTION, SOLUTION INTRAVENOUS at 06:41

## 2023-12-29 RX ADMIN — FLUTICASONE FUROATE AND VILANTEROL TRIFENATATE 1 PUFF: 200; 25 POWDER RESPIRATORY (INHALATION) at 08:53

## 2023-12-29 RX ADMIN — HYDROMORPHONE HYDROCHLORIDE 0.4 MG: 1 INJECTION, SOLUTION INTRAMUSCULAR; INTRAVENOUS; SUBCUTANEOUS at 06:41

## 2023-12-29 RX ADMIN — AMOXICILLIN 250 MG: 250 CAPSULE ORAL at 23:26

## 2023-12-29 RX ADMIN — ACETAMINOPHEN 650 MG: 325 TABLET ORAL at 18:20

## 2023-12-29 RX ADMIN — FOLIC ACID 1 MG: 1 TABLET ORAL at 08:53

## 2023-12-29 RX ADMIN — AMOXICILLIN 250 MG: 250 CAPSULE ORAL at 12:15

## 2023-12-29 RX ADMIN — KETOROLAC TROMETHAMINE 30 MG: 30 INJECTION, SOLUTION INTRAMUSCULAR; INTRAVENOUS at 21:56

## 2023-12-29 RX ADMIN — KETOROLAC TROMETHAMINE 30 MG: 30 INJECTION, SOLUTION INTRAMUSCULAR; INTRAVENOUS at 12:15

## 2023-12-29 RX ADMIN — OXYCODONE HYDROCHLORIDE 10 MG: 5 TABLET ORAL at 02:09

## 2023-12-29 RX ADMIN — KETOROLAC TROMETHAMINE 30 MG: 30 INJECTION, SOLUTION INTRAMUSCULAR; INTRAVENOUS at 17:15

## 2023-12-29 RX ADMIN — KETOROLAC TROMETHAMINE 30 MG: 30 INJECTION, SOLUTION INTRAMUSCULAR; INTRAVENOUS at 06:41

## 2023-12-29 RX ADMIN — PANTOPRAZOLE SODIUM 40 MG: 40 TABLET, DELAYED RELEASE ORAL at 06:42

## 2023-12-29 RX ADMIN — SENNOSIDES 8.6 MG: 8.6 TABLET, FILM COATED ORAL at 08:52

## 2023-12-29 RX ADMIN — POTASSIUM CHLORIDE 20 MEQ: 1500 TABLET, EXTENDED RELEASE ORAL at 12:49

## 2023-12-29 RX ADMIN — ENOXAPARIN SODIUM 40 MG: 100 INJECTION SUBCUTANEOUS at 08:53

## 2023-12-29 RX ADMIN — SENNOSIDES 8.6 MG: 8.6 TABLET, FILM COATED ORAL at 21:56

## 2023-12-29 RX ADMIN — OXYCODONE HYDROCHLORIDE 10 MG: 5 TABLET ORAL at 08:52

## 2023-12-29 RX ADMIN — AMOXICILLIN 250 MG: 250 CAPSULE ORAL at 00:43

## 2023-12-29 RX ADMIN — OXYCODONE HYDROCHLORIDE 10 MG: 5 TABLET ORAL at 17:15

## 2023-12-29 ASSESSMENT — COGNITIVE AND FUNCTIONAL STATUS - GENERAL
DAILY ACTIVITIY SCORE: 24
MOBILITY SCORE: 24

## 2023-12-29 ASSESSMENT — PAIN SCALES - GENERAL
PAINLEVEL_OUTOF10: 7
PAINLEVEL_OUTOF10: 1
PAINLEVEL_OUTOF10: 7
PAINLEVEL_OUTOF10: 7
PAINLEVEL_OUTOF10: 6

## 2023-12-29 ASSESSMENT — PAIN - FUNCTIONAL ASSESSMENT
PAIN_FUNCTIONAL_ASSESSMENT: 0-10

## 2023-12-29 NOTE — PROGRESS NOTES
Yakov Jimenez is a 23 y.o. male on day 2 of admission presenting with Sickle cell pain crisis (CMS/HCC).    Subjective   Patient laying comfortably in bed. States the pain is more controlled today on pain regimen, not ready to switch to only oral pain medications. States pain is primary in back, currently 9/10 severity. LBM last evening, liquid consistency. No bowel movements since. Denies HA, dizziness, CP, SOB, N/V/C. All other ROS otherwise negative.        Objective     Physical Exam  Constitutional:       General: He is not in acute distress.     Appearance: Normal appearance.   HENT:      Head: Normocephalic and atraumatic.      Mouth/Throat:      Mouth: Mucous membranes are moist.   Eyes:      General: No scleral icterus.     Extraocular Movements: Extraocular movements intact.      Pupils: Pupils are equal, round, and reactive to light.   Cardiovascular:      Rate and Rhythm: Normal rate and regular rhythm.      Pulses: Normal pulses.      Heart sounds: Normal heart sounds. No murmur heard.     No friction rub. No gallop.   Pulmonary:      Effort: Pulmonary effort is normal. No respiratory distress.      Breath sounds: Normal breath sounds. No wheezing, rhonchi or rales.   Abdominal:      General: Abdomen is flat. Bowel sounds are normal. There is no distension.      Palpations: Abdomen is soft. There is no mass.      Tenderness: There is no abdominal tenderness. There is no guarding.   Musculoskeletal:         General: No swelling, tenderness, deformity or signs of injury. Normal range of motion.      Cervical back: Normal range of motion and neck supple.   Skin:     General: Skin is warm.      Findings: No bruising, erythema or rash.   Neurological:      General: No focal deficit present.      Mental Status: He is alert and oriented to person, place, and time.      Cranial Nerves: No cranial nerve deficit.      Sensory: No sensory deficit.      Motor: No weakness.   Psychiatric:         Mood and Affect:  "Mood normal.         Behavior: Behavior normal.      Comments: Fluent speech, cooperative          Last Recorded Vitals  Blood pressure 112/70, pulse 83, temperature 36.7 °C (98.1 °F), temperature source Temporal, resp. rate 18, height 1.88 m (6' 2\"), weight 78.5 kg (173 lb 1 oz), SpO2 99 %.  Intake/Output last 3 Shifts:  I/O last 3 completed shifts:  In: 2977.7 (37.9 mL/kg) [I.V.:2977.7 (37.9 mL/kg)]  Out: 1550 (19.7 mL/kg) [Urine:1550 (0.5 mL/kg/hr)]  Weight: 78.5 kg        Scheduled medications  amoxicillin, 250 mg, oral, q12h  enoxaparin, 40 mg, subcutaneous, q24h EMIR  fluticasone furoate-vilanteroL, 1 puff, inhalation, Daily  folic acid, 1 mg, oral, Daily  [Held by provider] hydroxyurea, 2,000 mg, oral, Daily  ketorolac, 30 mg, intravenous, 4x daily  pantoprazole, 40 mg, oral, Daily before breakfast  sennosides, 1 tablet, oral, BID      Continuous medications       PRN medications  PRN medications: albuterol, calcium carbonate, HYDROmorphone, melatonin, oxyCODONE               Assessment/Plan   Principal Problem:    Sickle cell pain crisis (CMS/HCC)  Active Problems:    Flu    Yakov Jimenez is a 22 yo male with PMH of sickle cell disease (HbSS, on hydrea) and intrapulmonary shunt who presented to the ED with flu like symptoms and acute pain typical of his sickle cell disease. He tested positive for influenza A (12/21) as outpatient, and his symptoms have improved since then, but now with worsening pain. Patient with worsening Tbili and LDH on 12/27, started on LR @100mL/hour (12/27-current) in setting of active hemolysis. Discharge pending improvement of labs and pain.      #HgbSS disease w/ pain crisis   - crisis likely in setting of recent influenza A   - follows w/ Dr. Nelson outpatient, very rarely admitted for crisis   - OARRS reviewed- no aberrant behavior noted   - no active careplan available   - 12/26- current continue dilaudid 0.4mg IVP q2h PRN + oxycodone 10mg q6h PRN   - continue toradol 30mg IVP q6h " sched with PPI support x 3 days (12/27-planned stop 12/30)   - hgb at baseline (BL ~ 9), no indication for simple transfusion at this time ; Hgb 7.6 (12/29) likely dilaudid from IVF, no evidence of bleeding, lysis labs downtrending. Ctm   - CXR (12/26) without acute cardiopulmonary process   - LDH elevated on admission (BL ~ 350) - 1098 (12/28) but without abdominal pain; bili elevated (BL ~1.5), 3.8 (12/28) and now down trending - likely both elevated in setting of active crisis, but if develops abdominal pain or worsening labs would consider RUQ U/S   - s/p LR @ 100ml/hr in setting of active hemolysis (12/27-12/29)  - hgb S 85% (12/26)   - home hydrea 2000mg daily held 12/27 in setting of active infection-  indicating compliance   - continue home folic acid and amoxicillin ppx   - bowel regimen for opioid induced constipation   - Utox 12/27 WNL     # influenza A  - 12/21 as outpatient tested + for influenza A  - COVID and flu A/B neg on admit  - s/p tamiflu in ED 12/26  - RSV panel pending 12/28, collected 12/27   - mild leukocytosis of 14K noted (12/27), likely reactive     # small airway disease  -cont home inhalers      PPX: lovenox, encourage ambulation, PPI while toradol      DISPO  - FULL CODE, confirmed on admission  - discharge home no needs pending improvement in pain and labs  - Sulma Warner [phone]  - FUVs Dr. Nelson Heme 1/3      Assessment and Plan as above discussed with attending physician, Dr. Augustin PA-C

## 2023-12-29 NOTE — PROGRESS NOTES
Yakov Jimenez is a 23 y.o. male on day 2 of admission presenting with Sickle cell pain crisis (CMS/HCC).  TCC Note    Plan per Medical/Surgical Team: SCC pain mangement, follow up with labs  Status: Inpatient  Payor Source: [facility]  Discharge disposition: Home no needs  Expected date of discharge: TBD  Barriers: Medical  Will continue to follow patient for any discharge needs.

## 2023-12-29 NOTE — SIGNIFICANT EVENT
12/29/23 1815   Onset Documentation   Rapid Response Initiated By Radar auto page   Location/Room [facility] [place]   Pager Time [phone]   Arrival Time 1815   Event End Time 1821   Level II Called No   Primary Reason for Call Radar auto page     Rapid Response Note    Radar auto-page received for a radar score of 6 with the following vital signs: 38.8, 130, 116/61, 88%.  Vital signs were confirmed and reviewed with primary RN.  Patient had just ambulated to the bathroom without supplemental O2.  Primary RN will contact primary team regarding vital signs and requests no assistance from Rapid Response at this time.  RN will contact Rapid Response with future concerns or signs of clinical deterioration.

## 2023-12-30 LAB
ALBUMIN SERPL BCP-MCNC: 3.2 G/DL (ref 3.4–5)
ALP SERPL-CCNC: 143 U/L (ref 33–120)
ALT SERPL W P-5'-P-CCNC: 41 U/L (ref 10–52)
ANION GAP SERPL CALC-SCNC: 11 MMOL/L (ref 10–20)
AST SERPL W P-5'-P-CCNC: 58 U/L (ref 9–39)
BASO STIPL BLD QL SMEAR: PRESENT
BASOPHILS # BLD AUTO: 0.03 X10*3/UL (ref 0–0.1)
BASOPHILS NFR BLD AUTO: 0.3 %
BILIRUB SERPL-MCNC: 1.9 MG/DL (ref 0–1.2)
BUN SERPL-MCNC: 7 MG/DL (ref 6–23)
CALCIUM SERPL-MCNC: 8.6 MG/DL (ref 8.6–10.6)
CHLORIDE SERPL-SCNC: 104 MMOL/L (ref 98–107)
CO2 SERPL-SCNC: 29 MMOL/L (ref 21–32)
CREAT SERPL-MCNC: 0.73 MG/DL (ref 0.5–1.3)
EOSINOPHIL # BLD AUTO: 0.11 X10*3/UL (ref 0–0.7)
EOSINOPHIL NFR BLD AUTO: 1 %
ERYTHROCYTE [DISTWIDTH] IN BLOOD BY AUTOMATED COUNT: 22.1 % (ref 11.5–14.5)
GFR SERPL CREATININE-BSD FRML MDRD: >90 ML/MIN/1.73M*2
GLUCOSE SERPL-MCNC: 88 MG/DL (ref 74–99)
HCT VFR BLD AUTO: 22.7 % (ref 41–52)
HGB BLD-MCNC: 8.2 G/DL (ref 13.5–17.5)
HYPOCHROMIA BLD QL SMEAR: NORMAL
IMM GRANULOCYTES # BLD AUTO: 0.12 X10*3/UL (ref 0–0.7)
IMM GRANULOCYTES NFR BLD AUTO: 1.1 % (ref 0–0.9)
LYMPHOCYTES # BLD AUTO: 2.05 X10*3/UL (ref 1.2–4.8)
LYMPHOCYTES NFR BLD AUTO: 19.1 %
MCH RBC QN AUTO: 41 PG (ref 26–34)
MCHC RBC AUTO-ENTMCNC: 36.1 G/DL (ref 32–36)
MCV RBC AUTO: 114 FL (ref 80–100)
MONOCYTES # BLD AUTO: 0.71 X10*3/UL (ref 0.1–1)
MONOCYTES NFR BLD AUTO: 6.6 %
NEUTROPHILS # BLD AUTO: 7.7 X10*3/UL (ref 1.2–7.7)
NEUTROPHILS NFR BLD AUTO: 71.9 %
NRBC BLD-RTO: 22.8 /100 WBCS (ref 0–0)
PAPPENHEIMER BOD BLD QL SMEAR: PRESENT
PLATELET # BLD AUTO: 471 X10*3/UL (ref 150–450)
POLYCHROMASIA BLD QL SMEAR: NORMAL
POTASSIUM SERPL-SCNC: 3.5 MMOL/L (ref 3.5–5.3)
PROT SERPL-MCNC: 6.5 G/DL (ref 6.4–8.2)
RBC # BLD AUTO: 2 X10*6/UL (ref 4.5–5.9)
RBC MORPH BLD: NORMAL
SICKLE CELLS BLD QL SMEAR: NORMAL
SODIUM SERPL-SCNC: 140 MMOL/L (ref 136–145)
TARGETS BLD QL SMEAR: NORMAL
WBC # BLD AUTO: 10.7 X10*3/UL (ref 4.4–11.3)

## 2023-12-30 PROCEDURE — 81001 URINALYSIS AUTO W/SCOPE: CPT | Performed by: STUDENT IN AN ORGANIZED HEALTH CARE EDUCATION/TRAINING PROGRAM

## 2023-12-30 PROCEDURE — 36415 COLL VENOUS BLD VENIPUNCTURE: CPT | Performed by: STUDENT IN AN ORGANIZED HEALTH CARE EDUCATION/TRAINING PROGRAM

## 2023-12-30 PROCEDURE — 85025 COMPLETE CBC W/AUTO DIFF WBC: CPT | Performed by: STUDENT IN AN ORGANIZED HEALTH CARE EDUCATION/TRAINING PROGRAM

## 2023-12-30 PROCEDURE — 2500000004 HC RX 250 GENERAL PHARMACY W/ HCPCS (ALT 636 FOR OP/ED): Performed by: STUDENT IN AN ORGANIZED HEALTH CARE EDUCATION/TRAINING PROGRAM

## 2023-12-30 PROCEDURE — 99233 SBSQ HOSP IP/OBS HIGH 50: CPT

## 2023-12-30 PROCEDURE — 87641 MR-STAPH DNA AMP PROBE: CPT | Performed by: STUDENT IN AN ORGANIZED HEALTH CARE EDUCATION/TRAINING PROGRAM

## 2023-12-30 PROCEDURE — 2500000004 HC RX 250 GENERAL PHARMACY W/ HCPCS (ALT 636 FOR OP/ED): Performed by: NURSE PRACTITIONER

## 2023-12-30 PROCEDURE — 1170000001 HC PRIVATE ONCOLOGY ROOM DAILY

## 2023-12-30 PROCEDURE — 94640 AIRWAY INHALATION TREATMENT: CPT

## 2023-12-30 PROCEDURE — 84075 ASSAY ALKALINE PHOSPHATASE: CPT | Performed by: STUDENT IN AN ORGANIZED HEALTH CARE EDUCATION/TRAINING PROGRAM

## 2023-12-30 PROCEDURE — 87040 BLOOD CULTURE FOR BACTERIA: CPT | Performed by: STUDENT IN AN ORGANIZED HEALTH CARE EDUCATION/TRAINING PROGRAM

## 2023-12-30 PROCEDURE — 2500000001 HC RX 250 WO HCPCS SELF ADMINISTERED DRUGS (ALT 637 FOR MEDICARE OP): Performed by: STUDENT IN AN ORGANIZED HEALTH CARE EDUCATION/TRAINING PROGRAM

## 2023-12-30 PROCEDURE — 83605 ASSAY OF LACTIC ACID: CPT | Performed by: STUDENT IN AN ORGANIZED HEALTH CARE EDUCATION/TRAINING PROGRAM

## 2023-12-30 PROCEDURE — 2500000001 HC RX 250 WO HCPCS SELF ADMINISTERED DRUGS (ALT 637 FOR MEDICARE OP): Performed by: NURSE PRACTITIONER

## 2023-12-30 PROCEDURE — 87205 SMEAR GRAM STAIN: CPT | Performed by: STUDENT IN AN ORGANIZED HEALTH CARE EDUCATION/TRAINING PROGRAM

## 2023-12-30 PROCEDURE — 83021 HEMOGLOBIN CHROMOTOGRAPHY: CPT

## 2023-12-30 PROCEDURE — 96372 THER/PROPH/DIAG INJ SC/IM: CPT | Performed by: STUDENT IN AN ORGANIZED HEALTH CARE EDUCATION/TRAINING PROGRAM

## 2023-12-30 PROCEDURE — 80053 COMPREHEN METABOLIC PANEL: CPT | Performed by: STUDENT IN AN ORGANIZED HEALTH CARE EDUCATION/TRAINING PROGRAM

## 2023-12-30 PROCEDURE — 2500000002 HC RX 250 W HCPCS SELF ADMINISTERED DRUGS (ALT 637 FOR MEDICARE OP, ALT 636 FOR OP/ED): Performed by: STUDENT IN AN ORGANIZED HEALTH CARE EDUCATION/TRAINING PROGRAM

## 2023-12-30 PROCEDURE — 83020 HEMOGLOBIN ELECTROPHORESIS: CPT

## 2023-12-30 RX ORDER — OXYCODONE HYDROCHLORIDE 5 MG/1
5 TABLET ORAL EVERY 6 HOURS PRN
Status: DISCONTINUED | OUTPATIENT
Start: 2023-12-30 | End: 2024-01-03 | Stop reason: HOSPADM

## 2023-12-30 RX ORDER — ACETAMINOPHEN 325 MG/1
975 TABLET ORAL EVERY 6 HOURS PRN
Status: DISCONTINUED | OUTPATIENT
Start: 2023-12-30 | End: 2024-01-03 | Stop reason: HOSPADM

## 2023-12-30 RX ORDER — VANCOMYCIN HYDROCHLORIDE 1 G/200ML
2 INJECTION, SOLUTION INTRAVENOUS ONCE
Status: COMPLETED | OUTPATIENT
Start: 2023-12-30 | End: 2023-12-31

## 2023-12-30 RX ADMIN — OXYCODONE HYDROCHLORIDE 10 MG: 5 TABLET ORAL at 03:50

## 2023-12-30 RX ADMIN — AMOXICILLIN 250 MG: 250 CAPSULE ORAL at 11:24

## 2023-12-30 RX ADMIN — AMOXICILLIN 250 MG: 250 CAPSULE ORAL at 23:43

## 2023-12-30 RX ADMIN — KETOROLAC TROMETHAMINE 30 MG: 30 INJECTION, SOLUTION INTRAMUSCULAR; INTRAVENOUS at 06:53

## 2023-12-30 RX ADMIN — FOLIC ACID 1 MG: 1 TABLET ORAL at 08:44

## 2023-12-30 RX ADMIN — OXYCODONE HYDROCHLORIDE 10 MG: 5 TABLET ORAL at 11:24

## 2023-12-30 RX ADMIN — ENOXAPARIN SODIUM 40 MG: 100 INJECTION SUBCUTANEOUS at 08:44

## 2023-12-30 RX ADMIN — SENNOSIDES 8.6 MG: 8.6 TABLET, FILM COATED ORAL at 22:26

## 2023-12-30 RX ADMIN — PANTOPRAZOLE SODIUM 40 MG: 40 TABLET, DELAYED RELEASE ORAL at 06:53

## 2023-12-30 RX ADMIN — OXYCODONE HYDROCHLORIDE 5 MG: 5 TABLET ORAL at 22:26

## 2023-12-30 RX ADMIN — HYDROMORPHONE HYDROCHLORIDE 0.4 MG: 1 INJECTION, SOLUTION INTRAMUSCULAR; INTRAVENOUS; SUBCUTANEOUS at 14:24

## 2023-12-30 RX ADMIN — OXYCODONE HYDROCHLORIDE 10 MG: 5 TABLET ORAL at 18:14

## 2023-12-30 RX ADMIN — PIPERACILLIN SODIUM AND TAZOBACTAM SODIUM 4.5 G: 4; .5 INJECTION, SOLUTION INTRAVENOUS at 23:34

## 2023-12-30 RX ADMIN — SENNOSIDES 8.6 MG: 8.6 TABLET, FILM COATED ORAL at 08:44

## 2023-12-30 RX ADMIN — ACETAMINOPHEN 975 MG: 325 TABLET ORAL at 22:29

## 2023-12-30 RX ADMIN — FLUTICASONE FUROATE AND VILANTEROL TRIFENATATE 1 PUFF: 200; 25 POWDER RESPIRATORY (INHALATION) at 10:30

## 2023-12-30 ASSESSMENT — COGNITIVE AND FUNCTIONAL STATUS - GENERAL
MOBILITY SCORE: 24
DAILY ACTIVITIY SCORE: 24

## 2023-12-30 ASSESSMENT — PAIN SCALES - GENERAL
PAINLEVEL_OUTOF10: 5 - MODERATE PAIN
PAINLEVEL_OUTOF10: 4
PAINLEVEL_OUTOF10: 4
PAINLEVEL_OUTOF10: 7
PAINLEVEL_OUTOF10: 8
PAINLEVEL_OUTOF10: 5 - MODERATE PAIN
PAINLEVEL_OUTOF10: 8

## 2023-12-30 ASSESSMENT — PAIN - FUNCTIONAL ASSESSMENT
PAIN_FUNCTIONAL_ASSESSMENT: 0-10

## 2023-12-30 ASSESSMENT — PAIN DESCRIPTION - LOCATION
LOCATION: BACK

## 2023-12-30 NOTE — PROGRESS NOTES
"Yakov Jimenez is a 23 y.o. male on day 3 of admission presenting with Sickle cell pain crisis (CMS/HCC).    Subjective   Patient seen at bedside. States that he is feeling well. States that he is experiencing mild pain across his shoulders. Denies any SOB, CP, heart palpitations, headache dizziness or vision changes. Discussed his fever last night and he states that he has not had any new fevers overnight or this morning.         Objective     Physical Exam  HENT:      Head: Normocephalic.      Nose: Nose normal.      Mouth/Throat:      Mouth: Mucous membranes are moist.   Eyes:      Pupils: Pupils are equal, round, and reactive to light.   Cardiovascular:      Rate and Rhythm: Normal rate.   Pulmonary:      Effort: Pulmonary effort is normal.   Abdominal:      Palpations: Abdomen is soft.   Musculoskeletal:         General: Normal range of motion.      Cervical back: Normal range of motion.   Skin:     General: Skin is warm.   Neurological:      General: No focal deficit present.      Mental Status: He is alert.   Psychiatric:         Mood and Affect: Mood normal.         Behavior: Behavior normal.         Last Recorded Vitals  Blood pressure 103/56, pulse 92, temperature 36.6 °C (97.9 °F), temperature source Temporal, resp. rate 18, height 1.88 m (6' 2\"), weight 78.5 kg (173 lb 1 oz), SpO2 97 %.  Intake/Output last 3 Shifts:  I/O last 3 completed shifts:  In: 2963 (37.7 mL/kg) [I.V.:2963 (37.7 mL/kg)]  Out: 550 (7 mL/kg) [Urine:550 (0.2 mL/kg/hr)]  Weight: 78.5 kg     Relevant Results                             Assessment/Plan   Principal Problem:    Sickle cell pain crisis (CMS/HCC)  Active Problems:    Flu    Yakov Jimenez is a 22 yo male with PMH of sickle cell disease (HbSS, on hydrea) and intrapulmonary shunt who presented to the ED with flu like symptoms and acute pain typical of his sickle cell disease. He tested positive for influenza A (12/21) as outpatient, and his symptoms have improved since then, " but now with worsening pain. Patient with worsening Tbili and LDH on 12/27, started on LR @100mL/hour (12/27-current) in setting of active hemolysis. 12/30 having fever of 38.8 x1     #HgbSS disease w/ pain crisis   - crisis likely in setting of recent influenza A   - follows w/ Dr. Nelson outpatient, very rarely admitted for crisis   - OARRS reviewed- no aberrant behavior noted   - No active careplan available   - 12/26- current continue dilaudid 0.4mg IVP q2h PRN + oxycodone 10mg q6h PRN   - continue toradol 30mg IVP q6h sched with PPI support x 3 days (12/27-planned stop 12/30)   - hgb at baseline (BL ~ 9), no indication for simple transfusion at this time ;  - CXR (12/26) without acute cardiopulmonary process   - LDH and bili elevated on admit likely in setting or pain crisis. Now improved since admission.   - Patient without abdominal pain; bili elevated (BL ~1.5), 3.8. Now improving   - s/p LR @ 100ml/hr in setting of active hemolysis (12/27- 12/29)  - hgb S 85% (12/26)   - home hydrea 2000mg daily held 12/27 in setting of active infection-  indicating compliance   - continue home folic acid and amoxicillin ppx   - bowel regimen for opioid induced constipation   - Utox 12/27 WNL  - 12/29 fever x1 of 38.8 with tachycardia likely in setting of influenza. Repeat afebrile. No additional s/s acute chest or PNA. Consider infectious workup if fevers persist      # influenza A  - 12/21 as outpatient tested + for influenza A  - COVID and flu A/B neg on admit  - s/p tamiflu in ED 12/26  - RSV panel pending 12/28, collected 12/27   - mild leukocytosis of 14K noted (12/27), likely reactive     # small airway disease  -cont home inhalers      PPX: lovenox, encourage ambulation, PPI while toradol      DISPO  - FULL CODE, confirmed on admission  - discharge home no needs pending improvement in pain and labs  - Sulma Warner [phone]  - FUVs Dr. Nelson Heme 1/3         I spent 60 minutes in the professional and  overall care of this patient.      Eliane Albrecht, APRN-CNP

## 2023-12-30 NOTE — CARE PLAN
Problem: Skin  Goal: Participates in plan/prevention/treatment measures  Outcome: Progressing   The patient's goals for the shift include      The clinical goals for the shift include Barbie pain will be controlled this morning    Patient aox3. On the last set of Vitals for my shift patient spiked a temp of 101.8 and HR in the 130's. MD was paged and Tylenol ordered. An EKG was also done which showed Sinus tach. Tmax recheck was 98.4. Patient O2 Sat was also 88% on RA patient stated he took it off to go the bathroom and forgot to put it back on. An extension was provided for patient. Educated on leaving O2 on for now. Stated understanding of instructions.

## 2023-12-30 NOTE — CARE PLAN
Problem: Pain  Goal: Takes deep breaths with improved pain control throughout the shift  Outcome: Progressing  Goal: Turns in bed with improved pain control throughout the shift  Outcome: Progressing  Goal: Walks with improved pain control throughout the shift  Outcome: Progressing  Goal: Free from opioid side effects throughout the shift  Outcome: Progressing  Goal: Free from acute confusion related to pain meds throughout the shift  Outcome: Progressing     Problem: Fall/Injury  Goal: Not fall by end of shift  Outcome: Progressing  Goal: Be free from injury by end of the shift  Outcome: Progressing  Goal: Verbalize understanding of personal risk factors for fall in the hospital  Outcome: Progressing  Goal: Verbalize understanding of risk factor reduction measures to prevent injury from fall in the home  Outcome: Progressing     Problem: Skin  Goal: Participates in plan/prevention/treatment measures  Outcome: Progressing  Goal: Promote/optimize nutrition  Outcome: Progressing

## 2023-12-31 ENCOUNTER — APPOINTMENT (OUTPATIENT)
Dept: RADIOLOGY | Facility: HOSPITAL | Age: 23
End: 2023-12-31
Payer: MEDICAID

## 2023-12-31 ENCOUNTER — APPOINTMENT (OUTPATIENT)
Dept: CARDIOLOGY | Facility: HOSPITAL | Age: 23
End: 2023-12-31
Payer: MEDICAID

## 2023-12-31 LAB
ABO GROUP (TYPE) IN BLOOD: NORMAL
ALBUMIN SERPL BCP-MCNC: 3.1 G/DL (ref 3.4–5)
ALBUMIN SERPL BCP-MCNC: 3.3 G/DL (ref 3.4–5)
ALP SERPL-CCNC: 143 U/L (ref 33–120)
ALP SERPL-CCNC: 145 U/L (ref 33–120)
ALT SERPL W P-5'-P-CCNC: 48 U/L (ref 10–52)
ALT SERPL W P-5'-P-CCNC: 52 U/L (ref 10–52)
ANION GAP SERPL CALC-SCNC: 11 MMOL/L (ref 10–20)
ANION GAP SERPL CALC-SCNC: 11 MMOL/L (ref 10–20)
ANTIBODY SCREEN: NORMAL
APPEARANCE UR: CLEAR
AST SERPL W P-5'-P-CCNC: 49 U/L (ref 9–39)
AST SERPL W P-5'-P-CCNC: 57 U/L (ref 9–39)
BASO STIPL BLD QL SMEAR: PRESENT
BASOPHILS # BLD AUTO: 0.02 X10*3/UL (ref 0–0.1)
BASOPHILS # BLD AUTO: 0.03 X10*3/UL (ref 0–0.1)
BASOPHILS NFR BLD AUTO: 0.2 %
BASOPHILS NFR BLD AUTO: 0.3 %
BILIRUB SERPL-MCNC: 2.2 MG/DL (ref 0–1.2)
BILIRUB SERPL-MCNC: 4 MG/DL (ref 0–1.2)
BILIRUB UR STRIP.AUTO-MCNC: NEGATIVE MG/DL
BUN SERPL-MCNC: 7 MG/DL (ref 6–23)
BUN SERPL-MCNC: 8 MG/DL (ref 6–23)
CALCIUM SERPL-MCNC: 8.4 MG/DL (ref 8.6–10.6)
CALCIUM SERPL-MCNC: 9.1 MG/DL (ref 8.6–10.6)
CHLORIDE SERPL-SCNC: 100 MMOL/L (ref 98–107)
CHLORIDE SERPL-SCNC: 102 MMOL/L (ref 98–107)
CO2 SERPL-SCNC: 29 MMOL/L (ref 21–32)
CO2 SERPL-SCNC: 29 MMOL/L (ref 21–32)
COLOR UR: YELLOW
CREAT SERPL-MCNC: 0.73 MG/DL (ref 0.5–1.3)
CREAT SERPL-MCNC: 0.74 MG/DL (ref 0.5–1.3)
EOSINOPHIL # BLD AUTO: 0.05 X10*3/UL (ref 0–0.7)
EOSINOPHIL # BLD AUTO: 0.15 X10*3/UL (ref 0–0.7)
EOSINOPHIL NFR BLD AUTO: 0.5 %
EOSINOPHIL NFR BLD AUTO: 1.3 %
ERYTHROCYTE [DISTWIDTH] IN BLOOD BY AUTOMATED COUNT: 20.6 % (ref 11.5–14.5)
ERYTHROCYTE [DISTWIDTH] IN BLOOD BY AUTOMATED COUNT: 20.7 % (ref 11.5–14.5)
GFR SERPL CREATININE-BSD FRML MDRD: >90 ML/MIN/1.73M*2
GFR SERPL CREATININE-BSD FRML MDRD: >90 ML/MIN/1.73M*2
GLUCOSE SERPL-MCNC: 107 MG/DL (ref 74–99)
GLUCOSE SERPL-MCNC: 112 MG/DL (ref 74–99)
GLUCOSE UR STRIP.AUTO-MCNC: NEGATIVE MG/DL
HCT VFR BLD AUTO: 20.9 % (ref 41–52)
HCT VFR BLD AUTO: 21.8 % (ref 41–52)
HGB BLD-MCNC: 7.5 G/DL (ref 13.5–17.5)
HGB BLD-MCNC: 7.7 G/DL (ref 13.5–17.5)
HGB RETIC QN: 32 PG (ref 28–38)
HOLD SPECIMEN: NORMAL
HYPOCHROMIA BLD QL SMEAR: NORMAL
IMM GRANULOCYTES # BLD AUTO: 0.06 X10*3/UL (ref 0–0.7)
IMM GRANULOCYTES # BLD AUTO: 0.1 X10*3/UL (ref 0–0.7)
IMM GRANULOCYTES NFR BLD AUTO: 0.6 % (ref 0–0.9)
IMM GRANULOCYTES NFR BLD AUTO: 0.9 % (ref 0–0.9)
IMMATURE RETIC FRACTION: 44.2 %
KETONES UR STRIP.AUTO-MCNC: NEGATIVE MG/DL
LACTATE SERPL-SCNC: 0.8 MMOL/L (ref 0.4–2)
LDH SERPL L TO P-CCNC: 645 U/L (ref 84–246)
LEUKOCYTE ESTERASE UR QL STRIP.AUTO: NEGATIVE
LYMPHOCYTES # BLD AUTO: 1.93 X10*3/UL (ref 1.2–4.8)
LYMPHOCYTES # BLD AUTO: 2.19 X10*3/UL (ref 1.2–4.8)
LYMPHOCYTES NFR BLD AUTO: 18.1 %
LYMPHOCYTES NFR BLD AUTO: 19 %
MCH RBC QN AUTO: 39.3 PG (ref 26–34)
MCH RBC QN AUTO: 40.5 PG (ref 26–34)
MCHC RBC AUTO-ENTMCNC: 34.4 G/DL (ref 32–36)
MCHC RBC AUTO-ENTMCNC: 36.8 G/DL (ref 32–36)
MCV RBC AUTO: 110 FL (ref 80–100)
MCV RBC AUTO: 114 FL (ref 80–100)
MONOCYTES # BLD AUTO: 0.41 X10*3/UL (ref 0.1–1)
MONOCYTES # BLD AUTO: 0.85 X10*3/UL (ref 0.1–1)
MONOCYTES NFR BLD AUTO: 3.6 %
MONOCYTES NFR BLD AUTO: 8 %
MRSA DNA SPEC QL NAA+PROBE: DETECTED
MUCOUS THREADS #/AREA URNS AUTO: NORMAL /LPF
NEUTROPHILS # BLD AUTO: 7.76 X10*3/UL (ref 1.2–7.7)
NEUTROPHILS # BLD AUTO: 8.62 X10*3/UL (ref 1.2–7.7)
NEUTROPHILS NFR BLD AUTO: 72.6 %
NEUTROPHILS NFR BLD AUTO: 74.9 %
NITRITE UR QL STRIP.AUTO: NEGATIVE
NRBC BLD-RTO: 11 /100 WBCS (ref 0–0)
NRBC BLD-RTO: 5.8 /100 WBCS (ref 0–0)
PAPPENHEIMER BOD BLD QL SMEAR: PRESENT
PH UR STRIP.AUTO: 7 [PH]
PLATELET # BLD AUTO: 556 X10*3/UL (ref 150–450)
PLATELET # BLD AUTO: 604 X10*3/UL (ref 150–450)
POLYCHROMASIA BLD QL SMEAR: NORMAL
POLYCHROMASIA BLD QL SMEAR: NORMAL
POTASSIUM SERPL-SCNC: 3.4 MMOL/L (ref 3.5–5.3)
POTASSIUM SERPL-SCNC: 4.1 MMOL/L (ref 3.5–5.3)
PROT SERPL-MCNC: 6.4 G/DL (ref 6.4–8.2)
PROT SERPL-MCNC: 6.9 G/DL (ref 6.4–8.2)
PROT UR STRIP.AUTO-MCNC: ABNORMAL MG/DL
RBC # BLD AUTO: 1.9 X10*6/UL (ref 4.5–5.9)
RBC # BLD AUTO: 1.91 X10*6/UL (ref 4.5–5.9)
RBC # UR STRIP.AUTO: NEGATIVE /UL
RBC #/AREA URNS AUTO: NORMAL /HPF
RBC MORPH BLD: NORMAL
RBC MORPH BLD: NORMAL
RETICS #: 0.22 X10*6/UL (ref 0.02–0.12)
RETICS/RBC NFR AUTO: 11.6 % (ref 0.5–2)
RH FACTOR (ANTIGEN D): NORMAL
SICKLE CELLS BLD QL SMEAR: NORMAL
SICKLE CELLS BLD QL SMEAR: NORMAL
SODIUM SERPL-SCNC: 136 MMOL/L (ref 136–145)
SODIUM SERPL-SCNC: 139 MMOL/L (ref 136–145)
SP GR UR STRIP.AUTO: 1.01
TARGETS BLD QL SMEAR: NORMAL
TARGETS BLD QL SMEAR: NORMAL
UROBILINOGEN UR STRIP.AUTO-MCNC: <2 MG/DL
WBC # BLD AUTO: 10.7 X10*3/UL (ref 4.4–11.3)
WBC # BLD AUTO: 11.5 X10*3/UL (ref 4.4–11.3)
WBC #/AREA URNS AUTO: NORMAL /HPF

## 2023-12-31 PROCEDURE — 71046 X-RAY EXAM CHEST 2 VIEWS: CPT

## 2023-12-31 PROCEDURE — 99233 SBSQ HOSP IP/OBS HIGH 50: CPT

## 2023-12-31 PROCEDURE — 94640 AIRWAY INHALATION TREATMENT: CPT

## 2023-12-31 PROCEDURE — 85025 COMPLETE CBC W/AUTO DIFF WBC: CPT | Performed by: STUDENT IN AN ORGANIZED HEALTH CARE EDUCATION/TRAINING PROGRAM

## 2023-12-31 PROCEDURE — 86901 BLOOD TYPING SEROLOGIC RH(D): CPT

## 2023-12-31 PROCEDURE — 2500000004 HC RX 250 GENERAL PHARMACY W/ HCPCS (ALT 636 FOR OP/ED)

## 2023-12-31 PROCEDURE — 83615 LACTATE (LD) (LDH) ENZYME: CPT

## 2023-12-31 PROCEDURE — 36415 COLL VENOUS BLD VENIPUNCTURE: CPT | Performed by: STUDENT IN AN ORGANIZED HEALTH CARE EDUCATION/TRAINING PROGRAM

## 2023-12-31 PROCEDURE — 93010 ELECTROCARDIOGRAM REPORT: CPT | Performed by: INTERNAL MEDICINE

## 2023-12-31 PROCEDURE — 85045 AUTOMATED RETICULOCYTE COUNT: CPT

## 2023-12-31 PROCEDURE — 2500000004 HC RX 250 GENERAL PHARMACY W/ HCPCS (ALT 636 FOR OP/ED): Performed by: STUDENT IN AN ORGANIZED HEALTH CARE EDUCATION/TRAINING PROGRAM

## 2023-12-31 PROCEDURE — 1200000002 HC GENERAL ROOM WITH TELEMETRY DAILY

## 2023-12-31 PROCEDURE — 96372 THER/PROPH/DIAG INJ SC/IM: CPT | Performed by: STUDENT IN AN ORGANIZED HEALTH CARE EDUCATION/TRAINING PROGRAM

## 2023-12-31 PROCEDURE — 71046 X-RAY EXAM CHEST 2 VIEWS: CPT | Performed by: RADIOLOGY

## 2023-12-31 PROCEDURE — 2500000002 HC RX 250 W HCPCS SELF ADMINISTERED DRUGS (ALT 637 FOR MEDICARE OP, ALT 636 FOR OP/ED): Performed by: STUDENT IN AN ORGANIZED HEALTH CARE EDUCATION/TRAINING PROGRAM

## 2023-12-31 PROCEDURE — 2500000001 HC RX 250 WO HCPCS SELF ADMINISTERED DRUGS (ALT 637 FOR MEDICARE OP): Performed by: NURSE PRACTITIONER

## 2023-12-31 PROCEDURE — 84075 ASSAY ALKALINE PHOSPHATASE: CPT | Performed by: STUDENT IN AN ORGANIZED HEALTH CARE EDUCATION/TRAINING PROGRAM

## 2023-12-31 PROCEDURE — 2500000001 HC RX 250 WO HCPCS SELF ADMINISTERED DRUGS (ALT 637 FOR MEDICARE OP): Performed by: STUDENT IN AN ORGANIZED HEALTH CARE EDUCATION/TRAINING PROGRAM

## 2023-12-31 PROCEDURE — A4217 STERILE WATER/SALINE, 500 ML: HCPCS

## 2023-12-31 PROCEDURE — 36415 COLL VENOUS BLD VENIPUNCTURE: CPT

## 2023-12-31 PROCEDURE — 87205 SMEAR GRAM STAIN: CPT | Performed by: INTERNAL MEDICINE

## 2023-12-31 PROCEDURE — 93005 ELECTROCARDIOGRAM TRACING: CPT

## 2023-12-31 RX ORDER — POTASSIUM CHLORIDE 20 MEQ/1
20 TABLET, EXTENDED RELEASE ORAL ONCE
Status: COMPLETED | OUTPATIENT
Start: 2023-12-31 | End: 2023-12-31

## 2023-12-31 RX ORDER — ACETAMINOPHEN 325 MG/1
975 TABLET ORAL ONCE
Status: DISCONTINUED | OUTPATIENT
Start: 2023-12-31 | End: 2024-01-03 | Stop reason: HOSPADM

## 2023-12-31 RX ADMIN — AMOXICILLIN 250 MG: 250 CAPSULE ORAL at 11:32

## 2023-12-31 RX ADMIN — Medication 1250 MG: at 14:39

## 2023-12-31 RX ADMIN — PIPERACILLIN SODIUM AND TAZOBACTAM SODIUM 4.5 G: 4; .5 INJECTION, SOLUTION INTRAVENOUS at 22:01

## 2023-12-31 RX ADMIN — FOLIC ACID 1 MG: 1 TABLET ORAL at 08:58

## 2023-12-31 RX ADMIN — ACETAMINOPHEN 975 MG: 325 TABLET ORAL at 17:27

## 2023-12-31 RX ADMIN — POTASSIUM CHLORIDE 20 MEQ: 1500 TABLET, EXTENDED RELEASE ORAL at 14:39

## 2023-12-31 RX ADMIN — PIPERACILLIN SODIUM AND TAZOBACTAM SODIUM 4.5 G: 4; .5 INJECTION, SOLUTION INTRAVENOUS at 16:43

## 2023-12-31 RX ADMIN — VANCOMYCIN HYDROCHLORIDE 2 G: 1 INJECTION, SOLUTION INTRAVENOUS at 00:59

## 2023-12-31 RX ADMIN — SENNOSIDES 8.6 MG: 8.6 TABLET, FILM COATED ORAL at 08:58

## 2023-12-31 RX ADMIN — ACETAMINOPHEN 975 MG: 325 TABLET ORAL at 06:01

## 2023-12-31 RX ADMIN — OXYCODONE HYDROCHLORIDE 10 MG: 5 TABLET ORAL at 11:31

## 2023-12-31 RX ADMIN — HYDROMORPHONE HYDROCHLORIDE 0.4 MG: 1 INJECTION, SOLUTION INTRAMUSCULAR; INTRAVENOUS; SUBCUTANEOUS at 13:37

## 2023-12-31 RX ADMIN — HYDROMORPHONE HYDROCHLORIDE 0.4 MG: 1 INJECTION, SOLUTION INTRAMUSCULAR; INTRAVENOUS; SUBCUTANEOUS at 16:37

## 2023-12-31 RX ADMIN — PANTOPRAZOLE SODIUM 40 MG: 40 TABLET, DELAYED RELEASE ORAL at 08:58

## 2023-12-31 RX ADMIN — AZITHROMYCIN 500 MG: 500 INJECTION, POWDER, LYOPHILIZED, FOR SOLUTION INTRAVENOUS at 03:02

## 2023-12-31 RX ADMIN — FLUTICASONE FUROATE AND VILANTEROL TRIFENATATE 1 PUFF: 200; 25 POWDER RESPIRATORY (INHALATION) at 08:53

## 2023-12-31 RX ADMIN — ENOXAPARIN SODIUM 40 MG: 100 INJECTION SUBCUTANEOUS at 08:58

## 2023-12-31 ASSESSMENT — PAIN SCALES - GENERAL
PAINLEVEL_OUTOF10: 5 - MODERATE PAIN
PAINLEVEL_OUTOF10: 5 - MODERATE PAIN
PAINLEVEL_OUTOF10: 7
PAINLEVEL_OUTOF10: 4
PAINLEVEL_OUTOF10: 5 - MODERATE PAIN
PAINLEVEL_OUTOF10: 0 - NO PAIN
PAINLEVEL_OUTOF10: 7

## 2023-12-31 ASSESSMENT — PAIN - FUNCTIONAL ASSESSMENT
PAIN_FUNCTIONAL_ASSESSMENT: 0-10

## 2023-12-31 ASSESSMENT — PAIN DESCRIPTION - LOCATION
LOCATION: BACK

## 2023-12-31 ASSESSMENT — PAIN DESCRIPTION - ORIENTATION
ORIENTATION: UPPER
ORIENTATION: UPPER

## 2023-12-31 NOTE — PROGRESS NOTES
"Yakov Jimenez is a 23 y.o. male on day 4 of admission presenting with Sickle cell pain crisis (CMS/HCC).    Subjective   Patient seen at bedside. States that he continues to have fevers but denies any SOB, CP, heart palpitations, N/V/D/C, headaches dizziness or vision changes. Patient states that he is only experiencing mild shoulder pain        Objective     Physical Exam  HENT:      Head: Normocephalic.      Nose: Nose normal.      Mouth/Throat:      Mouth: Mucous membranes are moist.   Eyes:      Pupils: Pupils are equal, round, and reactive to light.   Cardiovascular:      Rate and Rhythm: Normal rate.   Pulmonary:      Effort: Pulmonary effort is normal.   Abdominal:      Palpations: Abdomen is soft.   Musculoskeletal:         General: Normal range of motion.      Cervical back: Normal range of motion.   Skin:     General: Skin is warm.   Neurological:      Mental Status: He is alert and oriented to person, place, and time.   Psychiatric:         Mood and Affect: Mood normal.         Behavior: Behavior normal.         Last Recorded Vitals  Blood pressure 115/65, pulse 99, temperature 37 °C (98.6 °F), temperature source Temporal, resp. rate 18, height 1.88 m (6' 2\"), weight 78.5 kg (173 lb 1 oz), SpO2 90 %.  Intake/Output last 3 Shifts:  I/O last 3 completed shifts:  In: 1590 (20.3 mL/kg) [P.O.:840; IV Piggyback:750]  Out: 1325 (16.9 mL/kg) [Urine:1325 (0.5 mL/kg/hr)]  Weight: 78.5 kg     Relevant Results                             Assessment/Plan   Principal Problem:    Sickle cell pain crisis (CMS/HCC)  Active Problems:    Flu    Yakov Jimenez is a 22 yo male with PMH of sickle cell disease (HbSS, on hydrea) and intrapulmonary shunt who presented to the ED with flu like symptoms and acute pain typical of his sickle cell disease. He tested positive for influenza A (12/21) as outpatient, and his symptoms have improved since then, but now with worsening pain. Patient with worsening Tbili and LDH on 12/27, " started on LR @100mL/hour (12/27-current) in setting of active hemolysis. 12/29 having fever of 38.8 x1. Fevers continue 12/30 at night. Patient given 1 dose Azithromycin, and started on Vancomycin (12/30-current) and Zosyn 12/30-current). CXR showing patchy bibasilar parenchymal airspace opacities concerning for developing pneumonia/infarct/aspiration. MRSA nasal swab positive 12/30. Bcx sent-pending. Resp cultures sent-pending.      #HgbSS disease w/ pain crisis   - Crisis likely in setting of recent influenza A   - follows w/ Dr. Nelson outpatient, very rarely admitted for crisis   - OARRS reviewed- no aberrant behavior noted   - No active careplan available   - 12/26- current continue dilaudid 0.4mg IVP q2h PRN + oxycodone 10mg q6h PRN   - continue toradol 30mg IVP q6h sched with PPI support x 3 days (12/27-planned stop 12/30)   - hgb at baseline (BL ~ 9), no indication for simple transfusion at this time ;  - CXR (12/26) without acute cardiopulmonary process   - LDH and bili elevated on admit likely in setting or pain crisis. Now improved since admission.   - Patient without abdominal pain; bili elevated (BL ~1.5), 3.8. Now improving   - s/p LR @ 100ml/hr in setting of active hemolysis (12/27- 12/29)  - hgb S 85% (12/26)   - home hydrea 2000mg daily held 12/27 in setting of active infection-  indicating compliance   - continue home folic acid  - Continue amoxicillin ppx-held on 12/31 in setting of new abx started   - bowel regimen for opioid induced constipation   - Utox 12/27 WNL  - Low threshold to exchange. Some exchange labs added to morning labs 12/31. Ionized calcium and coags ordered for AM 1/1    #PNA  - 12/29 fever x1 of 38.8 with tachycardia likely in setting of influenza vs new infection  - 12/30 fevers continue overnight. Bcx sent-pending, MRSA swab positive, CXR showing new developing PNA/infarct/aspiration  - 12/30 given x1 dose azithromycin, started on Zosyn (12/30-current), vancomycin  (12/30-current)  - Procal pending collection      # Influenza A  - 12/21 as outpatient tested + for influenza A  - COVID and flu A/B neg on admit  - s/p tamiflu in ED 12/26  - RSV panel pending 12/28, collected 12/27   - mild leukocytosis of 14K noted (12/27), likely reactive     # Small airway disease  -cont home inhalers      PPX: lovenox, encourage ambulation, PPI while toradol      DISPO  - FULL CODE, confirmed on admission  - discharge home no needs pending improvement in pain and labs  - Mom Alana [phone]  - FUVs Dr. Nelson Heme 1/3         I spent 60 minutes in the professional and overall care of this patient.      Eliane Albrecht, LIZBETH-CNP

## 2023-12-31 NOTE — CARE PLAN
The clinical goals for the shift include Patient will remain hemodynamically stable and free from fall/injury for entirety of shift.    Over the shift, the patient made progress toward the following goals. Barriers to progression include n/a. Recommendations to address these barriers include n/a.      Problem: Pain  Goal: Takes deep breaths with improved pain control throughout the shift  Outcome: Progressing  Goal: Turns in bed with improved pain control throughout the shift  Outcome: Progressing  Goal: Walks with improved pain control throughout the shift  Outcome: Progressing  Goal: Free from opioid side effects throughout the shift  Outcome: Progressing  Goal: Free from acute confusion related to pain meds throughout the shift  Outcome: Progressing     Problem: Fall/Injury  Goal: Not fall by end of shift  Outcome: Progressing  Goal: Be free from injury by end of the shift  Outcome: Progressing  Goal: Verbalize understanding of personal risk factors for fall in the hospital  Outcome: Progressing  Goal: Verbalize understanding of risk factor reduction measures to prevent injury from fall in the home  Outcome: Progressing     Problem: Skin  Goal: Participates in plan/prevention/treatment measures  Outcome: Progressing  Goal: Promote/optimize nutrition  Outcome: Progressing

## 2023-12-31 NOTE — SIGNIFICANT EVENT
RAPID RESPONSE RN NOTE- RADAR 6     12/31/23 1726   Onset Documentation   Rapid Response Initiated By Radar auto page   Location/Room [facility]  ([facility] 6036)   Pager Time [phone]   Arrival Time 1725   Event End Time 1735   Level II Called No   Primary Reason for Call Radar auto page  (RADAR 6)     VS: 38.5, 128, 18, 110/53, 93%.   Pt (flu +, Sickle cell crisis) denies heart racing, SOB. Tylenol administered for fever, 12 lead ECG obtained. VS consistent with vitals throughout this admission.  Nursing to contact Rapid Response Team with nay further concerns or patient deterioration.

## 2023-12-31 NOTE — PROGRESS NOTES
"Vancomycin Dosing by Pharmacy- INITIAL    Yakov Jimenez is a 23 y.o. year old male who Pharmacy has been consulted for vancomycin dosing for pneumonia. Based on the patient's indication and renal status this patient will be dosed based on a goal AUC of 400-600.     Renal function is currently stable.    Visit Vitals  /65   Pulse 99   Temp 37 °C (98.6 °F) (Temporal)   Resp 18        Lab Results   Component Value Date    CREATININE 0.74 12/30/2023    CREATININE 0.73 12/30/2023    CREATININE 0.78 12/29/2023    CREATININE 0.74 12/28/2023        Patient weight is No results found for: \"PTWEIGHT\"    No results found for: \"CULTURE\"     I/O last 3 completed shifts:  In: 1590 (20.3 mL/kg) [P.O.:840; IV Piggyback:750]  Out: 1325 (16.9 mL/kg) [Urine:1325 (0.5 mL/kg/hr)]  Weight: 78.5 kg   [unfilled]    No results found for: \"PATIENTTEMP\"       Assessment/Plan     Patient has already been given a loading dose of 2000 mg.  Will initiate vancomycin maintenance,  1250 mg every 12 hours.    This dosing regimen is predicted by [employer] to result in the following pharmacokinetic parameters:  Loading dose: N/A  Regimen: 1250 mg IV every 12 hours.  Start time: 12:47 on 12/31/2023  Exposure target: AUC24 (range)400-600 mg/L.hr   AUC24,ss: 463 mg/L.hr  Probability of AUC24 > 400: 65 %  Ctrough,ss: 13.1 mg/L  Probability of Ctrough,ss > 20: 21 %  Probability of nephrotoxicity (Lodise MINDA 2009): 8 %    Follow-up level will be ordered on 1/2 at AM labs unless clinically indicated sooner.  Will continue to monitor renal function daily while on vancomycin and order serum creatinine at least every 48 hours if not already ordered.  Follow for continued vancomycin needs, clinical response, and signs/symptoms of toxicity.       Arlette Mauro, PharmD       "

## 2024-01-01 LAB
ALBUMIN SERPL BCP-MCNC: 3.3 G/DL (ref 3.4–5)
ALP SERPL-CCNC: 159 U/L (ref 33–120)
ALT SERPL W P-5'-P-CCNC: 63 U/L (ref 10–52)
ANION GAP SERPL CALC-SCNC: 13 MMOL/L (ref 10–20)
APTT PPP: 31 SECONDS (ref 27–38)
AST SERPL W P-5'-P-CCNC: 54 U/L (ref 9–39)
BACTERIA SPEC RESP CULT: ABNORMAL
BACTERIA SPEC RESP CULT: ABNORMAL
BASOPHILS # BLD MANUAL: 0.1 X10*3/UL (ref 0–0.1)
BASOPHILS NFR BLD MANUAL: 0.9 %
BILIRUB SERPL-MCNC: 3.5 MG/DL (ref 0–1.2)
BUN SERPL-MCNC: 6 MG/DL (ref 6–23)
CA-I BLD-SCNC: 1.17 MMOL/L (ref 1.1–1.33)
CALCIUM SERPL-MCNC: 9 MG/DL (ref 8.6–10.6)
CHLORIDE SERPL-SCNC: 102 MMOL/L (ref 98–107)
CO2 SERPL-SCNC: 27 MMOL/L (ref 21–32)
CREAT SERPL-MCNC: 0.77 MG/DL (ref 0.5–1.3)
EOSINOPHIL # BLD MANUAL: 0 X10*3/UL (ref 0–0.7)
EOSINOPHIL NFR BLD MANUAL: 0 %
ERYTHROCYTE [DISTWIDTH] IN BLOOD BY AUTOMATED COUNT: 20.8 % (ref 11.5–14.5)
GFR SERPL CREATININE-BSD FRML MDRD: >90 ML/MIN/1.73M*2
GLUCOSE SERPL-MCNC: 77 MG/DL (ref 74–99)
GRAM STN SPEC: ABNORMAL
GRAM STN SPEC: ABNORMAL
HCT VFR BLD AUTO: 20.3 % (ref 41–52)
HGB BLD-MCNC: 6.8 G/DL (ref 13.5–17.5)
HYPOCHROMIA BLD QL SMEAR: ABNORMAL
IMM GRANULOCYTES # BLD AUTO: 0.09 X10*3/UL (ref 0–0.7)
IMM GRANULOCYTES NFR BLD AUTO: 0.8 % (ref 0–0.9)
INR PPP: 1.4 (ref 0.9–1.1)
LYMPHOCYTES # BLD MANUAL: 2.8 X10*3/UL (ref 1.2–4.8)
LYMPHOCYTES NFR BLD MANUAL: 24.1 %
MCH RBC QN AUTO: 37.4 PG (ref 26–34)
MCHC RBC AUTO-ENTMCNC: 33.5 G/DL (ref 32–36)
MCV RBC AUTO: 112 FL (ref 80–100)
MONOCYTES # BLD MANUAL: 0.7 X10*3/UL (ref 0.1–1)
MONOCYTES NFR BLD MANUAL: 6 %
NEUTS SEG # BLD MANUAL: 8 X10*3/UL (ref 1.2–7)
NEUTS SEG NFR BLD MANUAL: 69 %
NRBC BLD-RTO: 4.5 /100 WBCS (ref 0–0)
PAPPENHEIMER BOD BLD QL SMEAR: PRESENT
PLATELET # BLD AUTO: 656 X10*3/UL (ref 150–450)
PLATELET CLUMP BLD QL SMEAR: PRESENT
POLYCHROMASIA BLD QL SMEAR: ABNORMAL
POTASSIUM SERPL-SCNC: 3.8 MMOL/L (ref 3.5–5.3)
PROCALCITONIN SERPL-MCNC: 0.3 NG/ML
PROT SERPL-MCNC: 7.2 G/DL (ref 6.4–8.2)
PROTHROMBIN TIME: 15.7 SECONDS (ref 9.8–12.8)
RBC # BLD AUTO: 1.82 X10*6/UL (ref 4.5–5.9)
RBC MORPH BLD: ABNORMAL
SICKLE CELLS BLD QL SMEAR: ABNORMAL
SODIUM SERPL-SCNC: 138 MMOL/L (ref 136–145)
TARGETS BLD QL SMEAR: ABNORMAL
TOTAL CELLS COUNTED BLD: 116
WBC # BLD AUTO: 11.6 X10*3/UL (ref 4.4–11.3)

## 2024-01-01 PROCEDURE — 2500000004 HC RX 250 GENERAL PHARMACY W/ HCPCS (ALT 636 FOR OP/ED): Performed by: STUDENT IN AN ORGANIZED HEALTH CARE EDUCATION/TRAINING PROGRAM

## 2024-01-01 PROCEDURE — 2500000002 HC RX 250 W HCPCS SELF ADMINISTERED DRUGS (ALT 637 FOR MEDICARE OP, ALT 636 FOR OP/ED): Performed by: STUDENT IN AN ORGANIZED HEALTH CARE EDUCATION/TRAINING PROGRAM

## 2024-01-01 PROCEDURE — 84145 PROCALCITONIN (PCT): CPT

## 2024-01-01 PROCEDURE — 1200000002 HC GENERAL ROOM WITH TELEMETRY DAILY

## 2024-01-01 PROCEDURE — 85007 BL SMEAR W/DIFF WBC COUNT: CPT | Performed by: STUDENT IN AN ORGANIZED HEALTH CARE EDUCATION/TRAINING PROGRAM

## 2024-01-01 PROCEDURE — 2500000004 HC RX 250 GENERAL PHARMACY W/ HCPCS (ALT 636 FOR OP/ED)

## 2024-01-01 PROCEDURE — 80053 COMPREHEN METABOLIC PANEL: CPT | Performed by: STUDENT IN AN ORGANIZED HEALTH CARE EDUCATION/TRAINING PROGRAM

## 2024-01-01 PROCEDURE — 36415 COLL VENOUS BLD VENIPUNCTURE: CPT

## 2024-01-01 PROCEDURE — 94640 AIRWAY INHALATION TREATMENT: CPT

## 2024-01-01 PROCEDURE — 87081 CULTURE SCREEN ONLY: CPT

## 2024-01-01 PROCEDURE — 96372 THER/PROPH/DIAG INJ SC/IM: CPT | Performed by: STUDENT IN AN ORGANIZED HEALTH CARE EDUCATION/TRAINING PROGRAM

## 2024-01-01 PROCEDURE — 85027 COMPLETE CBC AUTOMATED: CPT | Performed by: STUDENT IN AN ORGANIZED HEALTH CARE EDUCATION/TRAINING PROGRAM

## 2024-01-01 PROCEDURE — 2500000001 HC RX 250 WO HCPCS SELF ADMINISTERED DRUGS (ALT 637 FOR MEDICARE OP): Performed by: STUDENT IN AN ORGANIZED HEALTH CARE EDUCATION/TRAINING PROGRAM

## 2024-01-01 PROCEDURE — 99221 1ST HOSP IP/OBS SF/LOW 40: CPT | Performed by: STUDENT IN AN ORGANIZED HEALTH CARE EDUCATION/TRAINING PROGRAM

## 2024-01-01 PROCEDURE — 82330 ASSAY OF CALCIUM: CPT

## 2024-01-01 PROCEDURE — 85610 PROTHROMBIN TIME: CPT

## 2024-01-01 PROCEDURE — A4217 STERILE WATER/SALINE, 500 ML: HCPCS

## 2024-01-01 PROCEDURE — 2500000001 HC RX 250 WO HCPCS SELF ADMINISTERED DRUGS (ALT 637 FOR MEDICARE OP): Performed by: NURSE PRACTITIONER

## 2024-01-01 PROCEDURE — 99232 SBSQ HOSP IP/OBS MODERATE 35: CPT

## 2024-01-01 RX ADMIN — PIPERACILLIN SODIUM AND TAZOBACTAM SODIUM 4.5 G: 4; .5 INJECTION, SOLUTION INTRAVENOUS at 12:14

## 2024-01-01 RX ADMIN — FLUTICASONE FUROATE AND VILANTEROL TRIFENATATE 1 PUFF: 200; 25 POWDER RESPIRATORY (INHALATION) at 09:42

## 2024-01-01 RX ADMIN — OXYCODONE HYDROCHLORIDE 10 MG: 5 TABLET ORAL at 06:13

## 2024-01-01 RX ADMIN — PIPERACILLIN SODIUM AND TAZOBACTAM SODIUM 4.5 G: 4; .5 INJECTION, SOLUTION INTRAVENOUS at 06:13

## 2024-01-01 RX ADMIN — ACETAMINOPHEN 975 MG: 325 TABLET ORAL at 21:40

## 2024-01-01 RX ADMIN — Medication 1250 MG: at 02:00

## 2024-01-01 RX ADMIN — ENOXAPARIN SODIUM 40 MG: 100 INJECTION SUBCUTANEOUS at 08:22

## 2024-01-01 RX ADMIN — Medication 1250 MG: at 14:11

## 2024-01-01 RX ADMIN — HYDROMORPHONE HYDROCHLORIDE 0.4 MG: 1 INJECTION, SOLUTION INTRAMUSCULAR; INTRAVENOUS; SUBCUTANEOUS at 02:28

## 2024-01-01 RX ADMIN — PANTOPRAZOLE SODIUM 40 MG: 40 TABLET, DELAYED RELEASE ORAL at 08:22

## 2024-01-01 RX ADMIN — FOLIC ACID 1 MG: 1 TABLET ORAL at 08:22

## 2024-01-01 RX ADMIN — SENNOSIDES 8.6 MG: 8.6 TABLET, FILM COATED ORAL at 08:22

## 2024-01-01 RX ADMIN — PIPERACILLIN SODIUM AND TAZOBACTAM SODIUM 4.5 G: 4; .5 INJECTION, SOLUTION INTRAVENOUS at 16:49

## 2024-01-01 RX ADMIN — OXYCODONE HYDROCHLORIDE 10 MG: 5 TABLET ORAL at 00:22

## 2024-01-01 RX ADMIN — ACETAMINOPHEN 975 MG: 325 TABLET ORAL at 14:10

## 2024-01-01 ASSESSMENT — PAIN DESCRIPTION - LOCATION: LOCATION: BACK

## 2024-01-01 ASSESSMENT — PAIN SCALES - GENERAL
PAINLEVEL_OUTOF10: 8
PAINLEVEL_OUTOF10: 9
PAINLEVEL_OUTOF10: 4
PAINLEVEL_OUTOF10: 7
PAINLEVEL_OUTOF10: 3

## 2024-01-01 ASSESSMENT — COGNITIVE AND FUNCTIONAL STATUS - GENERAL
DAILY ACTIVITIY SCORE: 24
MOBILITY SCORE: 24

## 2024-01-01 ASSESSMENT — PAIN - FUNCTIONAL ASSESSMENT
PAIN_FUNCTIONAL_ASSESSMENT: 0-10

## 2024-01-01 NOTE — CONSULTS
"Reason For Consult  Concern for ACS    History Of Present Illness  Yakov Jimenez is a 23 y.o. male with PMH of Hb SS disease presenting with worsening pain and flu like symptoms. Hematology is consulted for concern for acute chest.     Patient states he feels better than on admission but still endorses significant pain. His pain is mainly in his back. He normally has mild pain that flares about once a month but these usually do not require admission to the hospital like this time. He denies any dyspnea. He spiked a fever 12/31 to 38.5 for which he was started on broad spectrum antibiotics. CXR 12/31 showed \"patchy bibasilar parencyhmal airspace opacities concerning for developing pneumonia/infarct/aspiration.\"      Past Medical History  He has no past medical history on file.    Surgical History  He has a past surgical history that includes Other surgical history (07/14/2022) and MR angio head wo IV contrast (7/26/2019).     Social History  He reports that he has never smoked. He has never used smokeless tobacco. No history on file for alcohol use and drug use.    Family History  Family History   Problem Relation Name Age of Onset    Asthma Mother       Allergies  Ceftriaxone    Review of Systems  Negative other than per HPI.      Physical Exam  General: awake, alert, conversant, appears stated age  HEENT: pupils equal and round, no scleral icterus or conjunctivitis  Skin: no suspect lesions or rashes noted on visible skin  Chest: ctab, normal respiratory effort, not on supplemental oxygen  Cardiac: regular rate and rhythm, normal s1, s2, no M/R/G, no JVD  Abdomen: soft, ND, NT, no involuntary guarding  : no flank pain or indwelling urinary catheter  EXT: no peripheral edema, no asymmetry noted  MSK: no focal joint swelling noted  Neuro: AOx4, moving all limbs spontaneously, follows commands  Psych: coherent thought process, appropriate mood and affect      Last Recorded Vitals  Blood pressure 111/66, pulse 103, " "temperature 37.5 °C (99.5 °F), temperature source Temporal, resp. rate 18, height 1.88 m (6' 2\"), weight 78.5 kg (173 lb 1 oz), SpO2 93 %.    Relevant Results  Results for orders placed or performed during the hospital encounter of 12/26/23 (from the past 24 hour(s))   CBC and Auto Differential   Result Value Ref Range    WBC 11.6 (H) 4.4 - 11.3 x10*3/uL    nRBC 4.5 (H) 0.0 - 0.0 /100 WBCs    RBC 1.82 (L) 4.50 - 5.90 x10*6/uL    Hemoglobin 6.8 (L) 13.5 - 17.5 g/dL    Hematocrit 20.3 (L) 41.0 - 52.0 %     (H) 80 - 100 fL    MCH 37.4 (H) 26.0 - 34.0 pg    MCHC 33.5 32.0 - 36.0 g/dL    RDW 20.8 (H) 11.5 - 14.5 %    Platelets 656 (H) 150 - 450 x10*3/uL    Immature Granulocytes %, Automated 0.8 0.0 - 0.9 %    Immature Granulocytes Absolute, Automated 0.09 0.00 - 0.70 x10*3/uL   Comprehensive Metabolic Panel   Result Value Ref Range    Glucose 77 74 - 99 mg/dL    Sodium 138 136 - 145 mmol/L    Potassium 3.8 3.5 - 5.3 mmol/L    Chloride 102 98 - 107 mmol/L    Bicarbonate 27 21 - 32 mmol/L    Anion Gap 13 10 - 20 mmol/L    Urea Nitrogen 6 6 - 23 mg/dL    Creatinine 0.77 0.50 - 1.30 mg/dL    eGFR >90 >60 mL/min/1.73m*2    Calcium 9.0 8.6 - 10.6 mg/dL    Albumin 3.3 (L) 3.4 - 5.0 g/dL    Alkaline Phosphatase 159 (H) 33 - 120 U/L    Total Protein 7.2 6.4 - 8.2 g/dL    AST 54 (H) 9 - 39 U/L    Bilirubin, Total 3.5 (H) 0.0 - 1.2 mg/dL    ALT 63 (H) 10 - 52 U/L   Procalcitonin   Result Value Ref Range    Procalcitonin 0.30 (H) <=0.07 ng/mL   Coagulation Screen   Result Value Ref Range    Protime 15.7 (H) 9.8 - 12.8 seconds    INR 1.4 (H) 0.9 - 1.1    aPTT 31 27 - 38 seconds   Calcium, ionized   Result Value Ref Range    POCT Calcium, Ionized 1.17 1.1 - 1.33 mmol/L   Manual Differential   Result Value Ref Range    Neutrophils %, Manual 69.0 40.0 - 80.0 %    Lymphocytes %, Manual 24.1 13.0 - 44.0 %    Monocytes %, Manual 6.0 2.0 - 10.0 %    Eosinophils %, Manual 0.0 0.0 - 6.0 %    Basophils %, Manual 0.9 0.0 - 2.0 %    Seg " Neutrophils Absolute, Manual 8.00 (H) 1.20 - 7.00 x10*3/uL    Lymphocytes Absolute, Manual 2.80 1.20 - 4.80 x10*3/uL    Monocytes Absolute, Manual 0.70 0.10 - 1.00 x10*3/uL    Eosinophils Absolute, Manual 0.00 0.00 - 0.70 x10*3/uL    Basophils Absolute, Manual 0.10 0.00 - 0.10 x10*3/uL    Total Cells Counted 116     RBC Morphology See Below     Polychromasia Mild     Hypochromia Mild     Sickle Cells Few     Target Cells Few     Pappenheimer Bodies Present     Clumped Platelets Present       S% 85%, near baseline     Assessment/Plan     Yakov Jimenez is a 23 y.o. male with PMH of Hb SS disease presenting with worsening pain and flu like symptoms. Hematology is consulted for concern for acute chest.     Patient has had recent fever and CXR showing potential infiltrates. Patient however looks well clinically on room air without significant chest pain. Some hemolysis markers are improving but patient has become progressively anemic. Most likely this is pain crisis triggered by pneumonia.     Recommendations:  - no current indication for transfusion or exchange  - if patient develops symptoms from his anemia (fatigue, dyspnea, lightheadedness), would recommend simple transfusion  - would recommend testing for MRSA to see if vanco can be discontinued  - please trend CBC, LDH, retic, bili daily  - please maintain active T+S in case patient needs transfusion or exchange    Thank you for the consult. Hematology will follow with you. Please page [phone] with any questions. Patient seen and discussed with Dr. Braxton.     Jared Pond MD  Hematology-Oncology Fellow, PGY4  Hematology Consult Pager: [phone]  Oncology Consult Pager: [phone]

## 2024-01-01 NOTE — PROGRESS NOTES
"Yakov Jimenez is a 23 y.o. male on day 5 of admission presenting with Sickle cell pain crisis (CMS/HCC).    Subjective   No complaints this morning, still feels \"hot\" otherwise no complaints.        Objective   Vitals:    01/01/24 0818   BP: 114/62   Pulse: 109   Resp: 18   Temp: 36.9 °C (98.4 °F)   SpO2: 91%         Physical Exam  Constitutional:       Appearance: Normal appearance.   HENT:      Head: Normocephalic and atraumatic.   Eyes:      Extraocular Movements: Extraocular movements intact.      Pupils: Pupils are equal, round, and reactive to light.   Cardiovascular:      Rate and Rhythm: Normal rate and regular rhythm.      Heart sounds: No murmur heard.     No gallop.   Pulmonary:      Effort: Pulmonary effort is normal. No respiratory distress.      Breath sounds: Normal breath sounds. No wheezing or rales.   Abdominal:      General: Bowel sounds are normal.      Palpations: Abdomen is soft.      Tenderness: There is no abdominal tenderness. There is no guarding or rebound.   Skin:     General: Skin is warm and dry.   Neurological:      General: No focal deficit present.      Mental Status: He is alert and oriented to person, place, and time. Mental status is at baseline.   Psychiatric:         Mood and Affect: Mood normal.         Behavior: Behavior normal.       Last Recorded Vitals  Blood pressure 114/62, pulse 109, temperature 36.9 °C (98.4 °F), temperature source Temporal, resp. rate 18, height 1.88 m (6' 2\"), weight 78.5 kg (173 lb 1 oz), SpO2 91 %.  Intake/Output last 3 Shifts:  I/O last 3 completed shifts:  In: 2860 (36.4 mL/kg) [P.O.:1560; IV Piggyback:1300]  Out: 2575 (32.8 mL/kg) [Urine:2575 (0.9 mL/kg/hr)]  Weight: 78.5 kg     Relevant Results                  Scheduled medications  acetaminophen, 975 mg, oral, Once  enoxaparin, 40 mg, subcutaneous, q24h EMIR  fluticasone furoate-vilanteroL, 1 puff, inhalation, Daily  folic acid, 1 mg, oral, Daily  [Held by provider] hydroxyurea, 2,000 mg, " oral, Daily  pantoprazole, 40 mg, oral, Daily before breakfast  piperacillin-tazobactam-dextrose, 4.5 g, intravenous, q6h  sennosides, 1 tablet, oral, BID  vancomycin, 1,250 mg, intravenous, q12h      Continuous medications     PRN medications  PRN medications: acetaminophen, albuterol, calcium carbonate, HYDROmorphone, melatonin, oxyCODONE, oxyCODONE             Assessment/Plan   Principal Problem:    Sickle cell pain crisis (CMS/HCC)  Active Problems:    Flu  Yakov Jimenez is a 24 yo male with PMH of sickle cell disease (HbSS, on hydrea) and intrapulmonary shunt who presented to the ED with flu like symptoms and acute pain typical of his sickle cell disease. He tested positive for influenza A (12/21) as outpatient, and his symptoms have improved since then, but now with worsening pain. Patient with worsening Tbili and LDH on 12/27, started on LR @100mL/hour (12/27-current) in setting of active hemolysis. 12/29 having fever of 38.8 x1. Fevers continue 12/30 at night. Patient given 1 dose Azithromycin, and started on Vancomycin (12/30-current) and Zosyn 12/30-current). CXR showing patchy bibasilar parenchymal airspace opacities concerning for developing pneumonia/infarct/aspiration. MRSA nasal swab positive 12/30. Bcx sent NGTD. Resp cultures sent-pending.      Updates 1:1:  - Heme consult  -   #HgbSS disease w/ pain crisis   - Crisis likely in setting of recent influenza A   - follows w/ Dr. Nelson outpatient, very rarely admitted for crisis   - OARRS reviewed- no aberrant behavior noted   - No active careplan available   - 12/26- current continue dilaudid 0.4mg IVP q2h PRN + oxycodone 10mg q6h PRN   - continue toradol 30mg IVP q6h sched with PPI support x 3 days (12/27-planned stop 12/30)   - hgb at baseline (BL ~ 9), no indication for simple transfusion at this time ;  - CXR (12/26) without acute cardiopulmonary process   - LDH and bili elevated on admit likely in setting or pain crisis. Now improved since  admission.   - Patient without abdominal pain; bili elevated (BL ~1.5), 3.8. Now improving   - s/p LR @ 100ml/hr in setting of active hemolysis (12/27- 12/29)  - hgb S 85% (12/26)   - home hydrea 2000mg daily held 12/27 in setting of active infection-  indicating compliance   - continue home folic acid  - Continue amoxicillin ppx-held on 12/31 in setting of new abx started   - bowel regimen for opioid induced constipation   - Utox 12/27 WNL  - Low threshold to exchange. Some exchange labs added to morning labs 12/31. Ionized calcium and coags ordered for AM 1/1  - CXR yesterday patchy bibasilar parenchymal airspace opacities concerning for   developing pneumonia/infarct/aspiration.   - Heme consult for possible transfusion -- reported nothing to do for now     #PNA  - 12/29 fever x1 of 38.8 with tachycardia likely in setting of influenza vs new infection  - 12/30 fevers continue overnight. Bcx sent-pending, MRSA swab positive, CXR showing new developing PNA/infarct/aspiration  - 12/30 given x1 dose azithromycin, started on Zosyn (12/30-current), vancomycin (12/30-current)  - Procal collected, pending     # Influenza A  - 12/21 as outpatient tested + for influenza A  - COVID and flu A/B neg on admit  - s/p tamiflu in ED 12/26  - RSV panel pending 12/28, collected 12/27   - mild leukocytosis of 14K noted (12/27), likely reactive     # Small airway disease  -cont home inhalers      PPX: lovenox, encourage ambulation, PPI while toradol      DISPO  - FULL CODE, confirmed on admission  - discharge home no needs pending improvement in pain and labs  - Sulma Warner [phone]  - FUVs Dr. Nelson Heme 1/3      Fred Hernadez MD

## 2024-01-02 ENCOUNTER — APPOINTMENT (OUTPATIENT)
Dept: CARDIOLOGY | Facility: HOSPITAL | Age: 24
End: 2024-01-02
Payer: MEDICAID

## 2024-01-02 LAB
ALBUMIN SERPL BCP-MCNC: 3.5 G/DL (ref 3.4–5)
ALP SERPL-CCNC: 179 U/L (ref 33–120)
ALT SERPL W P-5'-P-CCNC: 73 U/L (ref 10–52)
ANION GAP SERPL CALC-SCNC: 12 MMOL/L (ref 10–20)
AST SERPL W P-5'-P-CCNC: 56 U/L (ref 9–39)
ATRIAL RATE: 117 BPM
BASOPHILS # BLD AUTO: 0.05 X10*3/UL (ref 0–0.1)
BASOPHILS NFR BLD AUTO: 0.4 %
BILIRUB SERPL-MCNC: 2.6 MG/DL (ref 0–1.2)
BUN SERPL-MCNC: 7 MG/DL (ref 6–23)
CALCIUM SERPL-MCNC: 9.3 MG/DL (ref 8.6–10.6)
CHLORIDE SERPL-SCNC: 103 MMOL/L (ref 98–107)
CO2 SERPL-SCNC: 28 MMOL/L (ref 21–32)
CREAT SERPL-MCNC: 0.78 MG/DL (ref 0.5–1.3)
EOSINOPHIL # BLD AUTO: 0.15 X10*3/UL (ref 0–0.7)
EOSINOPHIL NFR BLD AUTO: 1.3 %
ERYTHROCYTE [DISTWIDTH] IN BLOOD BY AUTOMATED COUNT: 21.6 % (ref 11.5–14.5)
GFR SERPL CREATININE-BSD FRML MDRD: >90 ML/MIN/1.73M*2
GLUCOSE SERPL-MCNC: 77 MG/DL (ref 74–99)
HCT VFR BLD AUTO: 22.2 % (ref 41–52)
HGB BLD-MCNC: 7.4 G/DL (ref 13.5–17.5)
IMM GRANULOCYTES # BLD AUTO: 0.07 X10*3/UL (ref 0–0.7)
IMM GRANULOCYTES NFR BLD AUTO: 0.6 % (ref 0–0.9)
LYMPHOCYTES # BLD AUTO: 2.97 X10*3/UL (ref 1.2–4.8)
LYMPHOCYTES NFR BLD AUTO: 26.1 %
MCH RBC QN AUTO: 37 PG (ref 26–34)
MCHC RBC AUTO-ENTMCNC: 33.3 G/DL (ref 32–36)
MCV RBC AUTO: 111 FL (ref 80–100)
MONOCYTES # BLD AUTO: 0.64 X10*3/UL (ref 0.1–1)
MONOCYTES NFR BLD AUTO: 5.6 %
NEUTROPHILS # BLD AUTO: 7.49 X10*3/UL (ref 1.2–7.7)
NEUTROPHILS NFR BLD AUTO: 66 %
NRBC BLD-RTO: 4.8 /100 WBCS (ref 0–0)
P AXIS: 32 DEGREES
P OFFSET: 203 MS
P ONSET: 155 MS
PAPPENHEIMER BOD BLD QL SMEAR: PRESENT
PLATELET # BLD AUTO: 746 X10*3/UL (ref 150–450)
POLYCHROMASIA BLD QL SMEAR: NORMAL
POTASSIUM SERPL-SCNC: 3.8 MMOL/L (ref 3.5–5.3)
PR INTERVAL: 128 MS
PROT SERPL-MCNC: 7.7 G/DL (ref 6.4–8.2)
Q ONSET: 219 MS
QRS COUNT: 19 BEATS
QRS DURATION: 80 MS
QT INTERVAL: 296 MS
QTC CALCULATION(BAZETT): 412 MS
QTC FREDERICIA: 370 MS
R AXIS: 56 DEGREES
RBC # BLD AUTO: 2 X10*6/UL (ref 4.5–5.9)
RBC MORPH BLD: NORMAL
SICKLE CELLS BLD QL SMEAR: NORMAL
SODIUM SERPL-SCNC: 139 MMOL/L (ref 136–145)
T AXIS: 77 DEGREES
T OFFSET: 367 MS
TARGETS BLD QL SMEAR: NORMAL
VANCOMYCIN SERPL-MCNC: 18.9 UG/ML (ref 5–20)
VENTRICULAR RATE: 117 BPM
WBC # BLD AUTO: 11.4 X10*3/UL (ref 4.4–11.3)

## 2024-01-02 PROCEDURE — A4217 STERILE WATER/SALINE, 500 ML: HCPCS

## 2024-01-02 PROCEDURE — 2500000002 HC RX 250 W HCPCS SELF ADMINISTERED DRUGS (ALT 637 FOR MEDICARE OP, ALT 636 FOR OP/ED): Performed by: STUDENT IN AN ORGANIZED HEALTH CARE EDUCATION/TRAINING PROGRAM

## 2024-01-02 PROCEDURE — 94640 AIRWAY INHALATION TREATMENT: CPT

## 2024-01-02 PROCEDURE — 2500000001 HC RX 250 WO HCPCS SELF ADMINISTERED DRUGS (ALT 637 FOR MEDICARE OP): Performed by: STUDENT IN AN ORGANIZED HEALTH CARE EDUCATION/TRAINING PROGRAM

## 2024-01-02 PROCEDURE — 2500000004 HC RX 250 GENERAL PHARMACY W/ HCPCS (ALT 636 FOR OP/ED)

## 2024-01-02 PROCEDURE — 1200000002 HC GENERAL ROOM WITH TELEMETRY DAILY

## 2024-01-02 PROCEDURE — 99232 SBSQ HOSP IP/OBS MODERATE 35: CPT

## 2024-01-02 PROCEDURE — 36415 COLL VENOUS BLD VENIPUNCTURE: CPT | Performed by: STUDENT IN AN ORGANIZED HEALTH CARE EDUCATION/TRAINING PROGRAM

## 2024-01-02 PROCEDURE — 80053 COMPREHEN METABOLIC PANEL: CPT | Performed by: STUDENT IN AN ORGANIZED HEALTH CARE EDUCATION/TRAINING PROGRAM

## 2024-01-02 PROCEDURE — 93005 ELECTROCARDIOGRAM TRACING: CPT

## 2024-01-02 PROCEDURE — 2500000004 HC RX 250 GENERAL PHARMACY W/ HCPCS (ALT 636 FOR OP/ED): Performed by: STUDENT IN AN ORGANIZED HEALTH CARE EDUCATION/TRAINING PROGRAM

## 2024-01-02 PROCEDURE — 85025 COMPLETE CBC W/AUTO DIFF WBC: CPT | Performed by: STUDENT IN AN ORGANIZED HEALTH CARE EDUCATION/TRAINING PROGRAM

## 2024-01-02 PROCEDURE — 96372 THER/PROPH/DIAG INJ SC/IM: CPT | Performed by: STUDENT IN AN ORGANIZED HEALTH CARE EDUCATION/TRAINING PROGRAM

## 2024-01-02 PROCEDURE — 80202 ASSAY OF VANCOMYCIN: CPT

## 2024-01-02 PROCEDURE — 2500000001 HC RX 250 WO HCPCS SELF ADMINISTERED DRUGS (ALT 637 FOR MEDICARE OP): Performed by: NURSE PRACTITIONER

## 2024-01-02 RX ADMIN — ACETAMINOPHEN 975 MG: 325 TABLET ORAL at 13:42

## 2024-01-02 RX ADMIN — PIPERACILLIN SODIUM AND TAZOBACTAM SODIUM 4.5 G: 4; .5 INJECTION, SOLUTION INTRAVENOUS at 00:03

## 2024-01-02 RX ADMIN — PIPERACILLIN SODIUM AND TAZOBACTAM SODIUM 4.5 G: 4; .5 INJECTION, SOLUTION INTRAVENOUS at 06:20

## 2024-01-02 RX ADMIN — OXYCODONE HYDROCHLORIDE 10 MG: 5 TABLET ORAL at 23:56

## 2024-01-02 RX ADMIN — SENNOSIDES 8.6 MG: 8.6 TABLET, FILM COATED ORAL at 20:53

## 2024-01-02 RX ADMIN — PANTOPRAZOLE SODIUM 40 MG: 40 TABLET, DELAYED RELEASE ORAL at 09:26

## 2024-01-02 RX ADMIN — OXYCODONE HYDROCHLORIDE 5 MG: 5 TABLET ORAL at 03:24

## 2024-01-02 RX ADMIN — HYDROMORPHONE HYDROCHLORIDE 0.4 MG: 1 INJECTION, SOLUTION INTRAMUSCULAR; INTRAVENOUS; SUBCUTANEOUS at 06:24

## 2024-01-02 RX ADMIN — FOLIC ACID 1 MG: 1 TABLET ORAL at 09:26

## 2024-01-02 RX ADMIN — PIPERACILLIN SODIUM AND TAZOBACTAM SODIUM 4.5 G: 4; .5 INJECTION, SOLUTION INTRAVENOUS at 23:54

## 2024-01-02 RX ADMIN — PIPERACILLIN SODIUM AND TAZOBACTAM SODIUM 4.5 G: 4; .5 INJECTION, SOLUTION INTRAVENOUS at 16:51

## 2024-01-02 RX ADMIN — SENNOSIDES 8.6 MG: 8.6 TABLET, FILM COATED ORAL at 09:26

## 2024-01-02 RX ADMIN — PIPERACILLIN SODIUM AND TAZOBACTAM SODIUM 4.5 G: 4; .5 INJECTION, SOLUTION INTRAVENOUS at 11:28

## 2024-01-02 RX ADMIN — Medication 1250 MG: at 02:29

## 2024-01-02 RX ADMIN — OXYCODONE HYDROCHLORIDE 10 MG: 5 TABLET ORAL at 10:02

## 2024-01-02 RX ADMIN — ENOXAPARIN SODIUM 40 MG: 100 INJECTION SUBCUTANEOUS at 09:26

## 2024-01-02 RX ADMIN — FLUTICASONE FUROATE AND VILANTEROL TRIFENATATE 1 PUFF: 200; 25 POWDER RESPIRATORY (INHALATION) at 11:30

## 2024-01-02 RX ADMIN — HYDROMORPHONE HYDROCHLORIDE 0.4 MG: 1 INJECTION, SOLUTION INTRAMUSCULAR; INTRAVENOUS; SUBCUTANEOUS at 13:36

## 2024-01-02 RX ADMIN — Medication 1250 MG: at 13:36

## 2024-01-02 ASSESSMENT — PAIN SCALES - GENERAL
PAINLEVEL_OUTOF10: 5 - MODERATE PAIN
PAINLEVEL_OUTOF10: 0 - NO PAIN
PAINLEVEL_OUTOF10: 8
PAINLEVEL_OUTOF10: 7
PAINLEVEL_OUTOF10: 5 - MODERATE PAIN
PAINLEVEL_OUTOF10: 6
PAINLEVEL_OUTOF10: 9

## 2024-01-02 ASSESSMENT — COGNITIVE AND FUNCTIONAL STATUS - GENERAL
MOBILITY SCORE: 24
MOBILITY SCORE: 24
DAILY ACTIVITIY SCORE: 24
DAILY ACTIVITIY SCORE: 24

## 2024-01-02 ASSESSMENT — PAIN - FUNCTIONAL ASSESSMENT
PAIN_FUNCTIONAL_ASSESSMENT: 0-10

## 2024-01-02 NOTE — PROGRESS NOTES
"Vancomycin Dosing by Pharmacy- FOLLOW UP    Yakov Jimenez is a 23 y.o. year old male who Pharmacy has been consulted for vancomycin dosing for pneumonia. Based on the patient's indication and renal status this patient is being dosed based on a goal AUC of 400-600.     Renal function is currently stable.    Current vancomycin dose: 1250 mg given every 12 hours    Estimated vancomycin AUC on current dose: 405 mg/L.hr     Visit Vitals  /74 (BP Location: Right arm, Patient Position: Lying)   Pulse 97   Temp 37.1 °C (98.8 °F) (Temporal)   Resp 18        Lab Results   Component Value Date    CREATININE 0.78 01/02/2024    CREATININE 0.77 01/01/2024    CREATININE 0.73 12/31/2023    CREATININE 0.74 12/30/2023        Patient weight is No results found for: \"PTWEIGHT\"    No results found for: \"CULTURE\"     I/O last 3 completed shifts:  In: 2760 (35.2 mL/kg) [P.O.:1760; IV Piggyback:1000]  Out: 1950 (24.8 mL/kg) [Urine:1950 (0.7 mL/kg/hr)]  Weight: 78.5 kg   [unfilled]    No results found for: \"PATIENTTEMP\"     Assessment/Plan    Within goal AUC range. Continue current vancomycin regimen.    This dosing regimen is predicted by [employer] to result in the following pharmacokinetic parameters:  Loading dose: N/A  Regimen: 1250 mg IV every 12 hours.  Start time: 14:29 on 01/02/2024  Exposure target: AUC24 (range)400-600 mg/L.hr   AUC24,ss: 405 mg/L.hr  Probability of AUC24 > 400: 52 %  Ctrough,ss: 11.4 mg/L  Probability of Ctrough,ss > 20: 6 %  Probability of nephrotoxicity (Lodise MINDA 2009): 7 %      The next level will be obtained on 1/9 at AM labs. May be obtained sooner if clinically indicated.   Will continue to monitor renal function daily while on vancomycin and order serum creatinine at least every 48 hours if not already ordered.  Follow for continued vancomycin needs, clinical response, and signs/symptoms of toxicity.       Nico Chew, PharmD           "

## 2024-01-02 NOTE — PROGRESS NOTES
"Yakov Jimenez is a 23 y.o. male on day 6 of admission presenting with Sickle cell pain crisis (CMS/HCC).    Subjective   Patient feels better today, states that pain is much more controlled. No other complaints.     Objective   Vitals:    01/02/24 0948   BP: 119/75   Pulse: 100   Resp: 18   Temp: 36.4 °C (97.5 °F)   SpO2: 93%       Physical Exam  Constitutional:       Appearance: Normal appearance.   HENT:      Head: Normocephalic and atraumatic.   Eyes:      Extraocular Movements: Extraocular movements intact.      Pupils: Pupils are equal, round, and reactive to light.   Cardiovascular:      Rate and Rhythm: Normal rate and regular rhythm.      Heart sounds: No murmur heard.     No gallop.   Pulmonary:      Effort: Pulmonary effort is normal. No respiratory distress.      Breath sounds: Normal breath sounds. No wheezing or rales.   Abdominal:      General: Bowel sounds are normal.      Palpations: Abdomen is soft.      Tenderness: There is no abdominal tenderness. There is no guarding or rebound.   Skin:     General: Skin is warm and dry.   Neurological:      General: No focal deficit present.      Mental Status: He is alert and oriented to person, place, and time. Mental status is at baseline.      Cranial Nerves: Cranial nerve deficit: PRN meds.   Psychiatric:         Mood and Affect: Mood normal.         Behavior: Behavior normal.     Last Recorded Vitals  Blood pressure 119/75, pulse 100, temperature 36.4 °C (97.5 °F), temperature source Temporal, resp. rate 18, height 1.88 m (6' 2\"), weight 78.5 kg (173 lb 1 oz), SpO2 93 %.  Intake/Output last 3 Shifts:  I/O last 3 completed shifts:  In: 2760 (35.2 mL/kg) [P.O.:1760; IV Piggyback:1000]  Out: 1950 (24.8 mL/kg) [Urine:1950 (0.7 mL/kg/hr)]  Weight: 78.5 kg     Scheduled medications  acetaminophen, 975 mg, oral, Once  enoxaparin, 40 mg, subcutaneous, q24h EMIR  fluticasone furoate-vilanteroL, 1 puff, inhalation, Daily  folic acid, 1 mg, oral, Daily  [Held by " provider] hydroxyurea, 2,000 mg, oral, Daily  pantoprazole, 40 mg, oral, Daily before breakfast  piperacillin-tazobactam-dextrose, 4.5 g, intravenous, q6h  sennosides, 1 tablet, oral, BID  vancomycin, 1,250 mg, intravenous, q12h    PRN  PRN medications: acetaminophen, albuterol, calcium carbonate, HYDROmorphone, melatonin, oxyCODONE, oxyCODONE    Assessment/Plan   Principal Problem:    Sickle cell pain crisis (CMS/HCC)  Active Problems:    Flu    Yakov Jimenez is a 22 yo male with 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 He tested positive for influenza A (12/21) as outpatient and was on oseltamivir, and his symptoms have improved since then, but now with worsening pain. Patient with worsening Tbili and LDH on 12/27, started on LR @100mL/hour (12/27-current) in setting of active hemolysis. 12/29 having fever of 38.8 x1. Fevers continue 12/30 at night. Patient given 1 dose Azithromycin, and started on Vancomycin (12/30-current) and Zosyn 12/30-current). CXR showing patchy bibasilar parenchymal airspace opacities concerning for developing pneumonia/infarct/aspiration. MRSA nasal swab positive 12/30. Bcx sent NGTD. Resp cultures sent-pending.      UPDATES:  -Follow up blood cx for abx management and de-escalation  -will need to trend CBC, LDH, retic, bili daily  -if patient develops symptoms from his anemia (fatigue, dyspnea, lightheadedness), simple transfusion per heme  - maintain active T+S in case patient needs transfusion or exchange    #HgbSS disease w/ pain crisis   - Crisis likely in setting of recent influenza A   - follows w/ Dr. Nelson outpatient, very rarely admitted for crisis   - OARRS reviewed- no aberrant behavior noted   - No active careplan available   - 12/26- current continue dilaudid 0.4mg IVP q2h PRN + oxycodone 10mg q6h PRN   - continue toradol 30mg IVP q6h sched with PPI support x 3  days (12/27-planned stop 12/30)   - hgb at baseline (BL ~ 9), no indication for simple transfusion at this time ;  - CXR (12/26) without acute cardiopulmonary process   - LDH and bili elevated on admit likely in setting or pain crisis. Now improved since admission.   - Patient without abdominal pain; bili elevated (BL ~1.5), 3.8. Now improving   - s/p LR @ 100ml/hr in setting of active hemolysis (12/27- 12/29)  - hgb S 85% (12/26)   - home hydrea 2000mg daily held 12/27 in setting of active infection-  indicating compliance   - continue home folic acid  - Continue amoxicillin ppx-held on 12/31 in setting of new abx started   - bowel regimen for opioid induced constipation   - Utox 12/27 WNL  - Low threshold to exchange. Some exchange labs added to morning labs 12/31. Ionized calcium and coags ordered for AM 1/1  - CXR: patchy bibasilar parenchymal airspace opacities concerning for   developing pneumonia/infarct/aspiration.   - Heme consult for possible transfusion -- reported nothing to do for now, will need to trend CBC, LDH, retic, bili daily, and if patient develops symptoms from his anemia (fatigue, dyspnea, lightheadedness), simple transfusion recommended by heme   - please maintain active T+S in case patient needs transfusion or exchange    #PNA  - 12/29 fever x1 of 38.8 with tachycardia likely in setting of influenza vs new infection  - 12/30 fevers continue overnight. Bcx sent-pending, MRSA swab positive, CXR showing new developing PNA/infarct/aspiration  - 12/30 given x1 dose azithromycin, started on Zosyn (12/30-current), vancomycin (12/30-current)  - Procal collected, pending     # Influenza A  - 12/21 as outpatient tested + for influenza A  - COVID and flu A/B neg on admit  - s/p tamiflu in ED 12/26  - RSV panel pending 12/28, collected 12/27   - mild leukocytosis of 14K noted (12/27), likely reactive     # Small airway disease  -cont home inhalers      PPX: lovenox, encourage ambulation, PPI  while toradol      DISPO  - FULL CODE, confirmed on admission  - discharge home no needs pending improvement in pain and labs  - Mom Alana [phone]  - FUVs  Anim Heme 1/3      Tanvi Ramírez MD

## 2024-01-03 ENCOUNTER — APPOINTMENT (OUTPATIENT)
Dept: HEMATOLOGY/ONCOLOGY | Facility: HOSPITAL | Age: 24
End: 2024-01-03
Payer: MEDICAID

## 2024-01-03 ENCOUNTER — PHARMACY VISIT (OUTPATIENT)
Dept: PHARMACY | Facility: CLINIC | Age: 24
End: 2024-01-03
Payer: MEDICAID

## 2024-01-03 VITALS
TEMPERATURE: 97.7 F | SYSTOLIC BLOOD PRESSURE: 116 MMHG | HEART RATE: 119 BPM | BODY MASS INDEX: 22.21 KG/M2 | WEIGHT: 173.06 LBS | HEIGHT: 74 IN | RESPIRATION RATE: 18 BRPM | DIASTOLIC BLOOD PRESSURE: 78 MMHG | OXYGEN SATURATION: 95 %

## 2024-01-03 LAB
ALBUMIN SERPL BCP-MCNC: 3.4 G/DL (ref 3.4–5)
ALP SERPL-CCNC: 191 U/L (ref 33–120)
ALT SERPL W P-5'-P-CCNC: 72 U/L (ref 10–52)
ANION GAP SERPL CALC-SCNC: 15 MMOL/L (ref 10–20)
AST SERPL W P-5'-P-CCNC: 49 U/L (ref 9–39)
ATRIAL RATE: 103 BPM
BASOPHILS # BLD AUTO: 0.04 X10*3/UL (ref 0–0.1)
BASOPHILS NFR BLD AUTO: 0.4 %
BILIRUB SERPL-MCNC: 2.5 MG/DL (ref 0–1.2)
BUN SERPL-MCNC: 7 MG/DL (ref 6–23)
CALCIUM SERPL-MCNC: 9 MG/DL (ref 8.6–10.6)
CHLORIDE SERPL-SCNC: 101 MMOL/L (ref 98–107)
CO2 SERPL-SCNC: 27 MMOL/L (ref 21–32)
CREAT SERPL-MCNC: 0.88 MG/DL (ref 0.5–1.3)
EOSINOPHIL # BLD AUTO: 0.13 X10*3/UL (ref 0–0.7)
EOSINOPHIL NFR BLD AUTO: 1.3 %
ERYTHROCYTE [DISTWIDTH] IN BLOOD BY AUTOMATED COUNT: 21.2 % (ref 11.5–14.5)
GFR SERPL CREATININE-BSD FRML MDRD: >90 ML/MIN/1.73M*2
GLUCOSE SERPL-MCNC: 89 MG/DL (ref 74–99)
HCT VFR BLD AUTO: 21.3 % (ref 41–52)
HEMOGLOBIN A2: 4.2 % (ref 2–3.5)
HEMOGLOBIN F: 9.9 % (ref 0–2)
HEMOGLOBIN IDENTIFICATION INTERPRETATION: ABNORMAL
HEMOGLOBIN S: 85.9 %
HGB BLD-MCNC: 7.2 G/DL (ref 13.5–17.5)
HGB RETIC QN: 35 PG (ref 28–38)
HOWELL-JOLLY BOD BLD QL SMEAR: PRESENT
IMM GRANULOCYTES # BLD AUTO: 0.09 X10*3/UL (ref 0–0.7)
IMM GRANULOCYTES NFR BLD AUTO: 0.9 % (ref 0–0.9)
IMMATURE RETIC FRACTION: 47.3 %
LDH SERPL L TO P-CCNC: 428 U/L (ref 84–246)
LYMPHOCYTES # BLD AUTO: 2.99 X10*3/UL (ref 1.2–4.8)
LYMPHOCYTES NFR BLD AUTO: 30.1 %
MAGNESIUM SERPL-MCNC: 2.17 MG/DL (ref 1.6–2.4)
MCH RBC QN AUTO: 37.9 PG (ref 26–34)
MCHC RBC AUTO-ENTMCNC: 33.8 G/DL (ref 32–36)
MCV RBC AUTO: 112 FL (ref 80–100)
MONOCYTES # BLD AUTO: 0.73 X10*3/UL (ref 0.1–1)
MONOCYTES NFR BLD AUTO: 7.3 %
NEUTROPHILS # BLD AUTO: 5.97 X10*3/UL (ref 1.2–7.7)
NEUTROPHILS NFR BLD AUTO: 60 %
NRBC BLD-RTO: 12.8 /100 WBCS (ref 0–0)
P AXIS: 44 DEGREES
P OFFSET: 200 MS
P ONSET: 156 MS
PAPPENHEIMER BOD BLD QL SMEAR: PRESENT
PATH REVIEW-CBC DIFFERENTIAL: NORMAL
PATH REVIEW-HGB IDENTIFICATION: ABNORMAL
PLATELET # BLD AUTO: 906 X10*3/UL (ref 150–450)
POLYCHROMASIA BLD QL SMEAR: NORMAL
POTASSIUM SERPL-SCNC: 4.6 MMOL/L (ref 3.5–5.3)
PR INTERVAL: 122 MS
PROT SERPL-MCNC: 6.9 G/DL (ref 6.4–8.2)
Q ONSET: 217 MS
QRS COUNT: 17 BEATS
QRS DURATION: 82 MS
QT INTERVAL: 334 MS
QTC CALCULATION(BAZETT): 437 MS
QTC FREDERICIA: 400 MS
R AXIS: 64 DEGREES
RBC # BLD AUTO: 1.9 X10*6/UL (ref 4.5–5.9)
RBC MORPH BLD: NORMAL
RETICS #: 0.22 X10*6/UL (ref 0.02–0.12)
RETICS/RBC NFR AUTO: 11.7 % (ref 0.5–2)
SICKLE CELLS BLD QL SMEAR: NORMAL
SODIUM SERPL-SCNC: 138 MMOL/L (ref 136–145)
STAPHYLOCOCCUS SPEC CULT: NORMAL
T AXIS: 69 DEGREES
T OFFSET: 384 MS
TARGETS BLD QL SMEAR: NORMAL
VENTRICULAR RATE: 103 BPM
WBC # BLD AUTO: 10 X10*3/UL (ref 4.4–11.3)

## 2024-01-03 PROCEDURE — 2500000001 HC RX 250 WO HCPCS SELF ADMINISTERED DRUGS (ALT 637 FOR MEDICARE OP): Performed by: NURSE PRACTITIONER

## 2024-01-03 PROCEDURE — 83735 ASSAY OF MAGNESIUM: CPT

## 2024-01-03 PROCEDURE — 85045 AUTOMATED RETICULOCYTE COUNT: CPT

## 2024-01-03 PROCEDURE — 96372 THER/PROPH/DIAG INJ SC/IM: CPT | Performed by: STUDENT IN AN ORGANIZED HEALTH CARE EDUCATION/TRAINING PROGRAM

## 2024-01-03 PROCEDURE — 36415 COLL VENOUS BLD VENIPUNCTURE: CPT | Performed by: STUDENT IN AN ORGANIZED HEALTH CARE EDUCATION/TRAINING PROGRAM

## 2024-01-03 PROCEDURE — 85025 COMPLETE CBC W/AUTO DIFF WBC: CPT | Performed by: STUDENT IN AN ORGANIZED HEALTH CARE EDUCATION/TRAINING PROGRAM

## 2024-01-03 PROCEDURE — 80053 COMPREHEN METABOLIC PANEL: CPT | Performed by: STUDENT IN AN ORGANIZED HEALTH CARE EDUCATION/TRAINING PROGRAM

## 2024-01-03 PROCEDURE — RXMED WILLOW AMBULATORY MEDICATION CHARGE

## 2024-01-03 PROCEDURE — 99239 HOSP IP/OBS DSCHRG MGMT >30: CPT | Performed by: PHYSICIAN ASSISTANT

## 2024-01-03 PROCEDURE — 83615 LACTATE (LD) (LDH) ENZYME: CPT

## 2024-01-03 PROCEDURE — 2500000002 HC RX 250 W HCPCS SELF ADMINISTERED DRUGS (ALT 637 FOR MEDICARE OP, ALT 636 FOR OP/ED): Performed by: STUDENT IN AN ORGANIZED HEALTH CARE EDUCATION/TRAINING PROGRAM

## 2024-01-03 PROCEDURE — 2500000001 HC RX 250 WO HCPCS SELF ADMINISTERED DRUGS (ALT 637 FOR MEDICARE OP): Performed by: PHYSICIAN ASSISTANT

## 2024-01-03 PROCEDURE — 2500000004 HC RX 250 GENERAL PHARMACY W/ HCPCS (ALT 636 FOR OP/ED)

## 2024-01-03 PROCEDURE — 2500000004 HC RX 250 GENERAL PHARMACY W/ HCPCS (ALT 636 FOR OP/ED): Performed by: STUDENT IN AN ORGANIZED HEALTH CARE EDUCATION/TRAINING PROGRAM

## 2024-01-03 PROCEDURE — 2500000001 HC RX 250 WO HCPCS SELF ADMINISTERED DRUGS (ALT 637 FOR MEDICARE OP): Performed by: STUDENT IN AN ORGANIZED HEALTH CARE EDUCATION/TRAINING PROGRAM

## 2024-01-03 RX ORDER — AMOXICILLIN AND CLAVULANATE POTASSIUM 875; 125 MG/1; MG/1
875 TABLET, FILM COATED ORAL 2 TIMES DAILY
Qty: 6 TABLET | Refills: 0 | Status: SHIPPED | OUTPATIENT
Start: 2024-01-03 | End: 2024-01-06

## 2024-01-03 RX ORDER — AMOXICILLIN AND CLAVULANATE POTASSIUM 875; 125 MG/1; MG/1
875 TABLET, FILM COATED ORAL EVERY 12 HOURS SCHEDULED
Status: DISCONTINUED | OUTPATIENT
Start: 2024-01-03 | End: 2024-01-03 | Stop reason: HOSPADM

## 2024-01-03 RX ORDER — OXYCODONE HYDROCHLORIDE 10 MG/1
10 TABLET ORAL EVERY 6 HOURS PRN
Qty: 15 TABLET | Refills: 0 | Status: SHIPPED | OUTPATIENT
Start: 2024-01-03 | End: 2024-01-10

## 2024-01-03 RX ORDER — HYDROXYUREA 500 MG/1
2000 CAPSULE ORAL DAILY
Qty: 120 CAPSULE | Refills: 0 | Status: SHIPPED | OUTPATIENT
Start: 2024-01-03 | End: 2024-02-02

## 2024-01-03 RX ADMIN — SENNOSIDES 8.6 MG: 8.6 TABLET, FILM COATED ORAL at 09:11

## 2024-01-03 RX ADMIN — PIPERACILLIN SODIUM AND TAZOBACTAM SODIUM 4.5 G: 4; .5 INJECTION, SOLUTION INTRAVENOUS at 11:14

## 2024-01-03 RX ADMIN — ENOXAPARIN SODIUM 40 MG: 100 INJECTION SUBCUTANEOUS at 09:11

## 2024-01-03 RX ADMIN — FLUTICASONE FUROATE AND VILANTEROL TRIFENATATE 1 PUFF: 200; 25 POWDER RESPIRATORY (INHALATION) at 07:27

## 2024-01-03 RX ADMIN — PIPERACILLIN SODIUM AND TAZOBACTAM SODIUM 4.5 G: 4; .5 INJECTION, SOLUTION INTRAVENOUS at 05:36

## 2024-01-03 RX ADMIN — FOLIC ACID 1 MG: 1 TABLET ORAL at 09:11

## 2024-01-03 RX ADMIN — ACETAMINOPHEN 975 MG: 325 TABLET ORAL at 07:27

## 2024-01-03 RX ADMIN — PANTOPRAZOLE SODIUM 40 MG: 40 TABLET, DELAYED RELEASE ORAL at 09:11

## 2024-01-03 RX ADMIN — Medication 1250 MG: at 02:35

## 2024-01-03 RX ADMIN — AMOXICILLIN AND CLAVULANATE POTASSIUM 875 MG: 875; 125 TABLET, FILM COATED ORAL at 12:40

## 2024-01-03 ASSESSMENT — COGNITIVE AND FUNCTIONAL STATUS - GENERAL
MOBILITY SCORE: 24
DAILY ACTIVITIY SCORE: 24

## 2024-01-03 ASSESSMENT — PAIN SCALES - GENERAL
PAINLEVEL_OUTOF10: 0 - NO PAIN
PAINLEVEL_OUTOF10: 4
PAINLEVEL_OUTOF10: 0 - NO PAIN

## 2024-01-03 ASSESSMENT — PAIN - FUNCTIONAL ASSESSMENT
PAIN_FUNCTIONAL_ASSESSMENT: 0-10
PAIN_FUNCTIONAL_ASSESSMENT: 0-10

## 2024-01-03 ASSESSMENT — PAIN DESCRIPTION - DESCRIPTORS: DESCRIPTORS: ACHING

## 2024-01-03 NOTE — PROGRESS NOTES
Vancomycin Dosing by Pharmacy- Cessation of Therapy    Consult to pharmacy for vancomycin dosing has been discontinued by the prescriber, pharmacy will sign off at this time.    Please call pharmacy if there are further questions or re-enter a consult if vancomycin is resumed.     Isabella Calderon, PharmD

## 2024-01-03 NOTE — CARE PLAN
The patient's goals for the shift include      The clinical goals for the shift include patients O2 Sat will be kept >92% this shift    Patient kept his O2 Sat >92%. Patient was discharge home with family. Medications from [facility] given to patient. Amoxicillin, Oxy and Hydroxyurea. Patient Right FA PIV was Dc'd gauze was applied to site.

## 2024-01-03 NOTE — DISCHARGE SUMMARY
Discharge Diagnosis  Sickle cell pain crisis (CMS/HCC)    Hospital Course  Yakov Jimenez is a 22 yo male with 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 He tested positive for influenza A (12/21) as outpatient and was on oseltamivir, and his symptoms have improved since then, but now with worsening pain. Patient with worsening Tbili and LDH on 12/27, IV hydration with LR. 12/29 having fever of 38.8 x1. Fevers continue 12/30 at night. Patient given 1 dose Azithromycin, and started on Vancomycin (12/30-1/3) and Zosyn 12/30-1/3). CXR showing patchy bibasilar parenchymal airspace opacities concerning for developing pneumonia/infarct/aspiration. MRSA nasal swab positive 12/30 with repeat negative 1/1. Blood culture was negative for 3 days. Resp cultures negative. Today patient's pain is well controlled, no fever for past 2 days. He is being discharged home today with Augmentin X 3 days to complete a course of abx and was also given Oxycodone 10mg X 7days. He has a FUV with Zaida Rocha CNP on 1/10.   On the day of discharge, the patient reported feeling well and pain was controlled. Vitals and labs were stable.   Attending has reviewed all labs and vitals, and discussed and agreed with the discharge plan prior to patient discharge.  Patient discharged in stable condition. > 30 minutes spent on discharge planning.    Pertinent Physical Exam At Time of Discharge  Physical Exam  General: alert, awake, and well-developed  Skin: warm, dry, intact  Head/Neck: NCAT, supple  Eyes: EOMI, no scleral icterus  Mouth: OMM, no erythema or discharge   Lungs: CTA, no adventitious breath sounds  Cardiovascular: RRR,no m/r/g, no edema  Abdomen: +BS, soft, non-tender, non-distended  Neuro: normal movement, normal speech   Psych: normal affect and mood    Home Medications     Medication List      START taking these  medications     amoxicillin-pot clavulanate 875-125 mg tablet; Commonly known as:   Augmentin; Take 1 tablet (875 mg) by mouth 2 times a day for 3 days.     CHANGE how you take these medications     * oxyCODONE 10 mg immediate release tablet; Commonly known as:   Roxicodone; What changed: Another medication with the same name was added.   Make sure you understand how and when to take each.   * oxyCODONE 10 mg immediate release tablet; Commonly known as:   Roxicodone; Take 1 tablet (10 mg) by mouth every 6 hours if needed for   severe pain (7 - 10) for up to 7 days.; What changed: You were already   taking a medication with the same name, and this prescription was added.   Make sure you understand how and when to take each.  * This list has 2 medication(s) that are the same as other medications   prescribed for you. Read the directions carefully, and ask your doctor or   other care provider to review them with you.     CONTINUE taking these medications     acetaminophen 500 mg tablet; Commonly known as: Tylenol   Advair Diskus 250-50 mcg/dose diskus inhaler; Generic drug: fluticasone   propion-salmeteroL; Inhale 1 puff every 12 hours.   albuterol 90 mcg/actuation inhaler; Inhale 1-2 puffs  EVERY 4 TO 6 HOURS   if needed for wheezing..   amoxicillin 250 mg capsule; Commonly known as: Amoxil; TAKE 1 CAPSULE BY   MOUTH EVERY 12 HOURS   folic acid 1 mg tablet; Commonly known as: Folvite; TAKE 1 TABLLET BY   MOUTH ONCE A DAY   hydroxyurea 500 mg capsule; Commonly known as: Hydrea; TAKE 4 CAPSULES   BY MOUTH ONCE DAILY   naloxone 4 mg/0.1 mL nasal spray; Commonly known as: Narcan; INSTILL 1   SPRAY IN ONE NOSTRIL AS NEEDED FOR ACCIDENTAL OPIOID OVERDOSE; REPEAT WITH   SECOND DOSE IN 5 MINUTES IF NO RESPONSE.   naproxen 500 mg tablet; Commonly known as: Naprosyn   Tums 200 mg calcium chewable tablet; Generic drug: calcium carbonate     STOP taking these medications     meclizine 25 mg tablet; Commonly known as: Antivert        Outpatient Follow-Up  Future Appointments   Date Time Provider Department Center   1/10/2024  8:30 AM Zaida Rocha, APRN-CNP [id number]       Marvin Tracey PA-C

## 2024-01-03 NOTE — CARE PLAN
The patient's goals for the shift include  to have adequate pain control    The clinical goals for the shift include to show no signs of respiratory distress and maintain oxygen on RA above 93%

## 2024-01-04 LAB
BACTERIA BLD CULT: NORMAL
BACTERIA BLD CULT: NORMAL

## 2024-01-10 ENCOUNTER — APPOINTMENT (OUTPATIENT)
Dept: HEMATOLOGY/ONCOLOGY | Facility: HOSPITAL | Age: 24
End: 2024-01-10
Payer: MEDICAID

## 2024-02-05 ENCOUNTER — APPOINTMENT (OUTPATIENT)
Dept: HEMATOLOGY/ONCOLOGY | Facility: HOSPITAL | Age: 24
End: 2024-02-05
Payer: MEDICAID

## 2024-02-05 ENCOUNTER — TELEPHONE (OUTPATIENT)
Dept: ADMISSION | Facility: HOSPITAL | Age: 24
End: 2024-02-05
Payer: MEDICAID

## 2024-02-05 NOTE — TELEPHONE ENCOUNTER
Voicemail left on unverified line, patient has missed multiple follow ups, including today. He will need clinic appointment prior to refills, please direct to scheduling

## 2024-02-07 DIAGNOSIS — D57.00 SICKLE CELL DISEASE WITH CRISIS (MULTI): ICD-10-CM

## 2024-02-07 PROCEDURE — RXMED WILLOW AMBULATORY MEDICATION CHARGE

## 2024-02-07 RX ORDER — HYDROXYUREA 500 MG/1
2000 CAPSULE ORAL DAILY
Qty: 120 CAPSULE | Refills: 0 | Status: CANCELLED | OUTPATIENT
Start: 2024-02-07 | End: 2024-03-08

## 2024-02-09 ENCOUNTER — PHARMACY VISIT (OUTPATIENT)
Dept: PHARMACY | Facility: CLINIC | Age: 24
End: 2024-02-09
Payer: MEDICAID

## 2024-02-26 ENCOUNTER — APPOINTMENT (OUTPATIENT)
Dept: HEMATOLOGY/ONCOLOGY | Facility: HOSPITAL | Age: 24
End: 2024-02-26
Payer: MEDICAID

## 2024-02-26 ENCOUNTER — OFFICE VISIT (OUTPATIENT)
Dept: HEMATOLOGY/ONCOLOGY | Facility: HOSPITAL | Age: 24
End: 2024-02-26
Payer: MEDICAID

## 2024-02-26 ENCOUNTER — LAB (OUTPATIENT)
Dept: LAB | Facility: HOSPITAL | Age: 24
End: 2024-02-26
Payer: MEDICAID

## 2024-02-26 ENCOUNTER — PHARMACY VISIT (OUTPATIENT)
Dept: PHARMACY | Facility: CLINIC | Age: 24
End: 2024-02-26
Payer: MEDICAID

## 2024-02-26 VITALS
DIASTOLIC BLOOD PRESSURE: 57 MMHG | HEART RATE: 101 BPM | HEIGHT: 72 IN | OXYGEN SATURATION: 95 % | WEIGHT: 173 LBS | TEMPERATURE: 98.8 F | BODY MASS INDEX: 23.43 KG/M2 | RESPIRATION RATE: 17 BRPM | SYSTOLIC BLOOD PRESSURE: 126 MMHG

## 2024-02-26 DIAGNOSIS — Z79.64 ENCOUNTER FOR MONITORING OF HYDROXYUREA THERAPY: ICD-10-CM

## 2024-02-26 DIAGNOSIS — D57.00 SICKLE CELL DISEASE WITH CRISIS (MULTI): Primary | ICD-10-CM

## 2024-02-26 DIAGNOSIS — Z51.81 ENCOUNTER FOR MONITORING OF HYDROXYUREA THERAPY: ICD-10-CM

## 2024-02-26 DIAGNOSIS — D57.00 SICKLE CELL DISEASE WITH CRISIS (MULTI): ICD-10-CM

## 2024-02-26 DIAGNOSIS — J45.909 MODERATE ASTHMA WITHOUT COMPLICATION, UNSPECIFIED WHETHER PERSISTENT (HHS-HCC): ICD-10-CM

## 2024-02-26 LAB
ALBUMIN SERPL BCP-MCNC: 4.3 G/DL (ref 3.4–5)
ALP SERPL-CCNC: 118 U/L (ref 33–120)
ALT SERPL W P-5'-P-CCNC: 16 U/L (ref 10–52)
ANION GAP SERPL CALC-SCNC: 9 MMOL/L (ref 10–20)
AST SERPL W P-5'-P-CCNC: 23 U/L (ref 9–39)
BASOPHILS # BLD AUTO: 0.04 X10*3/UL (ref 0–0.1)
BASOPHILS NFR BLD AUTO: 1.4 %
BILIRUB SERPL-MCNC: 2.2 MG/DL (ref 0–1.2)
BUN SERPL-MCNC: 11 MG/DL (ref 6–23)
CALCIUM SERPL-MCNC: 9.3 MG/DL (ref 8.6–10.3)
CHLORIDE SERPL-SCNC: 105 MMOL/L (ref 98–107)
CO2 SERPL-SCNC: 29 MMOL/L (ref 21–32)
CREAT SERPL-MCNC: 0.67 MG/DL (ref 0.5–1.3)
DACRYOCYTES BLD QL SMEAR: NORMAL
EGFRCR SERPLBLD CKD-EPI 2021: >90 ML/MIN/1.73M*2
EOSINOPHIL # BLD AUTO: 0.02 X10*3/UL (ref 0–0.7)
EOSINOPHIL NFR BLD AUTO: 0.7 %
ERYTHROCYTE [DISTWIDTH] IN BLOOD BY AUTOMATED COUNT: 21.2 % (ref 11.5–14.5)
GLUCOSE SERPL-MCNC: 86 MG/DL (ref 74–99)
HCT VFR BLD AUTO: 22.4 % (ref 41–52)
HGB BLD-MCNC: 8.4 G/DL (ref 13.5–17.5)
HGB RETIC QN: 41 PG (ref 28–38)
HOWELL-JOLLY BOD BLD QL SMEAR: PRESENT
IMM GRANULOCYTES # BLD AUTO: 0.01 X10*3/UL (ref 0–0.7)
IMM GRANULOCYTES NFR BLD AUTO: 0.3 % (ref 0–0.9)
IMMATURE RETIC FRACTION: 21 %
LDH SERPL L TO P-CCNC: 278 U/L (ref 84–246)
LYMPHOCYTES # BLD AUTO: 1.64 X10*3/UL (ref 1.2–4.8)
LYMPHOCYTES NFR BLD AUTO: 55.8 %
MCH RBC QN AUTO: 44.7 PG (ref 26–34)
MCHC RBC AUTO-ENTMCNC: 37.5 G/DL (ref 32–36)
MCV RBC AUTO: 119 FL (ref 80–100)
MONOCYTES # BLD AUTO: 0.18 X10*3/UL (ref 0.1–1)
MONOCYTES NFR BLD AUTO: 6.1 %
NEUTROPHILS # BLD AUTO: 1.05 X10*3/UL (ref 1.2–7.7)
NEUTROPHILS NFR BLD AUTO: 35.7 %
NRBC BLD-RTO: 40.1 /100 WBCS (ref 0–0)
PAPPENHEIMER BOD BLD QL SMEAR: PRESENT
PLATELET # BLD AUTO: 179 X10*3/UL (ref 150–450)
POLYCHROMASIA BLD QL SMEAR: NORMAL
POTASSIUM SERPL-SCNC: 4.1 MMOL/L (ref 3.5–5.3)
PROT SERPL-MCNC: 8 G/DL (ref 6.4–8.2)
RBC # BLD AUTO: 1.88 X10*6/UL (ref 4.5–5.9)
RBC MORPH BLD: NORMAL
RETICS #: 0.1 X10*6/UL (ref 0.02–0.12)
RETICS/RBC NFR AUTO: 5.4 % (ref 0.5–2)
SICKLE CELLS BLD QL SMEAR: NORMAL
SODIUM SERPL-SCNC: 139 MMOL/L (ref 136–145)
TARGETS BLD QL SMEAR: NORMAL
WBC # BLD AUTO: 2.9 X10*3/UL (ref 4.4–11.3)

## 2024-02-26 PROCEDURE — 85045 AUTOMATED RETICULOCYTE COUNT: CPT

## 2024-02-26 PROCEDURE — 99214 OFFICE O/P EST MOD 30 MIN: CPT | Performed by: PEDIATRICS

## 2024-02-26 PROCEDURE — 83615 LACTATE (LD) (LDH) ENZYME: CPT

## 2024-02-26 PROCEDURE — RXMED WILLOW AMBULATORY MEDICATION CHARGE

## 2024-02-26 PROCEDURE — 36415 COLL VENOUS BLD VENIPUNCTURE: CPT

## 2024-02-26 PROCEDURE — 84075 ASSAY ALKALINE PHOSPHATASE: CPT

## 2024-02-26 PROCEDURE — 85025 COMPLETE CBC W/AUTO DIFF WBC: CPT

## 2024-02-26 RX ORDER — HYDROXYUREA 500 MG/1
2000 CAPSULE ORAL DAILY
Qty: 120 CAPSULE | Refills: 3 | Status: SHIPPED | OUTPATIENT
Start: 2024-02-26 | End: 2024-03-19 | Stop reason: SDUPTHER

## 2024-02-26 RX ORDER — OXYCODONE HYDROCHLORIDE 10 MG/1
10 TABLET ORAL EVERY 6 HOURS PRN
Qty: 15 TABLET | Refills: 0 | Status: SHIPPED | OUTPATIENT
Start: 2024-02-26 | End: 2024-03-19 | Stop reason: SDUPTHER

## 2024-02-26 ASSESSMENT — ENCOUNTER SYMPTOMS
OCCASIONAL FEELINGS OF UNSTEADINESS: 0
LOSS OF SENSATION IN FEET: 0
DEPRESSION: 0

## 2024-02-26 ASSESSMENT — PAIN SCALES - GENERAL: PAINLEVEL: 0-NO PAIN

## 2024-02-27 ASSESSMENT — ENCOUNTER SYMPTOMS
SCLERAL ICTERUS: 0
WOUND: 0
LIGHT-HEADEDNESS: 0
WHEEZING: 0
VOMITING: 0
LEG SWELLING: 0
FLANK PAIN: 0
CONSTIPATION: 0
COUGH: 0
CHEST TIGHTNESS: 0
MYALGIAS: 0
SORE THROAT: 0
APPETITE CHANGE: 0
BLOOD IN STOOL: 0
PALPITATIONS: 0
FATIGUE: 0
ABDOMINAL PAIN: 0
NAUSEA: 0
CHILLS: 0
EXTREMITY WEAKNESS: 0
NUMBNESS: 0
HEMOPTYSIS: 0
UNEXPECTED WEIGHT CHANGE: 0
HEADACHES: 0
DYSURIA: 0
FEVER: 0
EYE PROBLEMS: 0
DIARRHEA: 0
ARTHRALGIAS: 0
BACK PAIN: 0
DEPRESSION: 0
NERVOUS/ANXIOUS: 0
FREQUENCY: 0
HEMATOLOGIC/LYMPHATIC NEGATIVE: 1

## 2024-02-27 NOTE — PROGRESS NOTES
SICKLE CELL OUTPATIENT NOTE  Patient ID: Yakov Jimenez   Visit Type: Follow up visit       ASSESSMENT AND PLAN  Yakov Jimenez is 23 y.o. male with     History of Hb SS Sickle cell disease in steady state and without acute sickle cell complications including pain. Pain regimen will be as follows  Oral Tylenol and or ibuprofen/naprosyn as needed for mild to moderate pain   Oral oxycodone 10 mg every 4-6 hours prn and patient was given a new prescription   Non pharmacologic methods of pain control   Reviewed OARRS  Narcan available at all times and reviewed indications    Encounter for management of disease modifying therapy. Yakov Jimenez is currently on Hydroxyurea and continues to tolerate this without any major side effects    Continue hydroxyurea 2000 mg daily   Follow up on CBC and CMP for toxicity monitoring     Chronic anemia secondary to SCD with Hb of 8.4 g/dl  Daily folic acid     History of reactive airway disease. PFT was negative for airway obstruction, but had KJC76-27% predicted of 62%. He is also on  ICS and not required albuterol. Sleep study was negative for significant sleep-related breathing disorder. The Sp02 isak was 91.0% nocturnal hypoxia on supplemental night time oxygen.    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 He will  need a CBC and CMP         Chief Complaint: Follow up for HB SS SCD      Interval History: Yakov is present, in clinic today for follow-up of hemoglobin SS sickle cell disease.  He was admitted to hospital from 12/26-13/24 for acute sickle cell pain. He woke up with severe pain that would not resolved with his home oral pain regimen and as such presented to the ED.   He is without any complaints in clinic today. He denies acute sickle cell pain. He  remains on oral hydroxyurea, 2 g daily and has been compliant with this. He is yet to follow up with pulmonology. He is not convinced that he has asthma as he does not have symptoms that other people he knows have asthma experience. He denies fever, chest pain, chest tightness, shortness of breath, nausea, or vomiting in clinic today. He also denies focal neurologic deficits or priapism. He has not had any changes or worsening of his vision.     Review of System:   Review of Systems   Constitutional:  Negative for appetite change, chills, fatigue, fever and unexpected weight change.   HENT:   Negative for nosebleeds and sore throat.    Eyes:  Negative for eye problems and icterus.   Respiratory:  Negative for chest tightness, cough, hemoptysis and wheezing.    Cardiovascular:  Negative for chest pain, leg swelling and palpitations.   Gastrointestinal:  Negative for abdominal pain, blood in stool, constipation, diarrhea, nausea and vomiting.   Genitourinary:  Negative for dysuria, frequency and nocturia.    Musculoskeletal:  Negative for arthralgias, back pain, flank pain, gait problem and myalgias.   Skin:  Negative for rash and wound.   Neurological:  Negative for extremity weakness, gait problem, headaches, light-headedness and numbness.   Hematological: Negative.    Psychiatric/Behavioral:  Negative for depression. The patient is not nervous/anxious.       Allergies:   Allergies   Allergen Reactions    Ceftriaxone Anaphylaxis     Current Medications:   Outpatient Encounter Medications as of 10/23/2023   Medication Sig    acetaminophen (Tylenol) 500 mg tablet Take 1-2 tablets (500-1,000 mg) by mouth once daily as needed.    naproxen (Naprosyn) 500 mg tablet Take by mouth.  May take one Naproxen with one tylenol for pain if tylenol alone doesn't work for mild to moderate sickle cell pain    Advair Diskus 250-50 mcg/dose Diskus inhaler Inhale 1 puff every 12 hours.    albuterol 90 mcg/actuation inhaler Inhale 1-2  "puffs  EVERY 4 TO 6 HOURS if needed for wheezing..    amoxicillin (Amoxil) 250 mg capsule TAKE 1 CAPSULE BY MOUTH EVERY 12 HOURS    calcium carbonate (Tums) 200 mg calcium chewable tablet Chew 1 tablet (500 mg) if needed.    ergocalciferol (Vitamin D-2) 1.25 MG (52802 UT) capsule     folic acid (Folvite) 1 mg tablet TAKE 1 TABLET BY MOUTH ONCE A DAY    hydroxyurea (Hydrea) 500 mg capsule TAKE 4 CAPSULES BY MOUTH ONCE DAILY    melatonin 5 mg tablet     naloxone (Narcan) 4 mg/0.1 mL nasal spray INSTILL 1 SPRAY IN ONE NOSTRIL AS NEEDED FOR ACCIDENTAL OPIOID OVERDOSE; REPEAT WITH SECOND DOSE IN 5 MINUTES IF NO RESPONSE.    oxyCODONE (Roxicodone) 10 mg immediate release tablet TAKE 1 TABLET BY MOUTH EVERY 6 HOURS     Past Medical History:    Hb SS SCD    Vitreous hemorrhage right eye, March 2019. s/p laser and close f/u via optho. Last opthal   Chronic fatigue      Past Surgical History:   She has a past surgical history that includes Other surgical history (07/14/2022) and MR angio head wo IV contrast (7/26/2019). Lives with Grandparents and uncle. 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 Mother passed away from complications of SCD and her uncle also has  SCD      Family History:   Family History   Problem Relation Name Age of Onset    Asthma Mother         Social History:   Yakov Jimenez  reports that he has never smoked. He has never used smokeless tobacco.  has no history on file for drug use.    EXAMINATION FINDINGS   /57   Pulse 101   Temp 37.1 °C (98.8 °F) (Skin)   Resp 17   Ht (S) 1.831 m (6' 0.09\")   Wt 78.5 kg (173 lb)   SpO2 95%   BMI 23.41 kg/m²   2 meters squared  Physical Exam  Vitals and nursing note reviewed.   Constitutional:       General: He is not in acute distress.     Appearance: Normal appearance. He is not ill-appearing.   HENT:      Nose: Nose normal.      Mouth/Throat:      Mouth: Mucous membranes are moist.      Pharynx: Oropharynx is clear. No oropharyngeal " exudate or posterior oropharyngeal erythema.   Eyes:      General: No scleral icterus.        Right eye: No discharge.         Left eye: No discharge.      Extraocular Movements: Extraocular movements intact.      Pupils: Pupils are equal, round, and reactive to light.   Cardiovascular:      Rate and Rhythm: Normal rate and regular rhythm.      Pulses: Normal pulses.      Heart sounds: Normal heart sounds, S1 normal and S2 normal. No murmur heard.  Pulmonary:      Effort: Pulmonary effort is normal. No respiratory distress.      Breath sounds: Normal breath sounds. No wheezing or rales.   Abdominal:      General: Abdomen is flat. There is no distension.      Palpations: Abdomen is soft. There is no hepatomegaly or splenomegaly.      Tenderness: There is no abdominal tenderness. There is no guarding.   Musculoskeletal:         General: No swelling or deformity. Normal range of motion.      Cervical back: Normal range of motion and neck supple.      Right lower leg: No edema.      Left lower leg: No edema.   Skin:     General: Skin is warm.      Capillary Refill: Capillary refill takes less than 2 seconds.      Coloration: Skin is not jaundiced.      Findings: No bruising or lesion.   Neurological:      General: No focal deficit present.      Mental Status: He is alert and oriented to person, place, and time.      Cranial Nerves: No cranial nerve deficit.      Motor: No weakness.      Gait: Gait normal.   Psychiatric:         Mood and Affect: Mood normal.         Behavior: Behavior normal.       LABS   Lab on 02/26/2024   Component Date Value Ref Range Status    WBC 02/26/2024 2.9 (L)  4.4 - 11.3 x10*3/uL Final    nRBC 02/26/2024 40.1 (H)  0.0 - 0.0 /100 WBCs Final    RBC 02/26/2024 1.88 (L)  4.50 - 5.90 x10*6/uL Final    Hemoglobin 02/26/2024 8.4 (L)  13.5 - 17.5 g/dL Final    Hematocrit 02/26/2024 22.4 (L)  41.0 - 52.0 % Final    MCV 02/26/2024 119 (H)  80 - 100 fL Final    MCH 02/26/2024 44.7 (H)  26.0 - 34.0 pg  Final    MCHC 02/26/2024 37.5 (H)  32.0 - 36.0 g/dL Final    RDW 02/26/2024 21.2 (H)  11.5 - 14.5 % Final    Platelets 02/26/2024 179  150 - 450 x10*3/uL Final    Neutrophils % 02/26/2024 35.7  40.0 - 80.0 % Final    Immature Granulocytes %, Automated 02/26/2024 0.3  0.0 - 0.9 % Final    Immature Granulocyte Count (IG) includes promyelocytes, myelocytes and metamyelocytes but does not include bands. Percent differential counts (%) should be interpreted in the context of the absolute cell counts (cells/UL).    Lymphocytes % 02/26/2024 55.8  13.0 - 44.0 % Final    Monocytes % 02/26/2024 6.1  2.0 - 10.0 % Final    Eosinophils % 02/26/2024 0.7  0.0 - 6.0 % Final    Basophils % 02/26/2024 1.4  0.0 - 2.0 % Final    Neutrophils Absolute 02/26/2024 1.05 (L)  1.20 - 7.70 x10*3/uL Final    Percent differential counts (%) should be interpreted in the context of the absolute cell counts (cells/uL).    Immature Granulocytes Absolute, Au* 02/26/2024 0.01  0.00 - 0.70 x10*3/uL Final    Lymphocytes Absolute 02/26/2024 1.64  1.20 - 4.80 x10*3/uL Final    Monocytes Absolute 02/26/2024 0.18  0.10 - 1.00 x10*3/uL Final    Eosinophils Absolute 02/26/2024 0.02  0.00 - 0.70 x10*3/uL Final    Basophils Absolute 02/26/2024 0.04  0.00 - 0.10 x10*3/uL Final    Glucose 02/26/2024 86  74 - 99 mg/dL Final    Sodium 02/26/2024 139  136 - 145 mmol/L Final    Potassium 02/26/2024 4.1  3.5 - 5.3 mmol/L Final    Chloride 02/26/2024 105  98 - 107 mmol/L Final    Bicarbonate 02/26/2024 29  21 - 32 mmol/L Final    Anion Gap 02/26/2024 9 (L)  10 - 20 mmol/L Final    Urea Nitrogen 02/26/2024 11  6 - 23 mg/dL Final    Creatinine 02/26/2024 0.67  0.50 - 1.30 mg/dL Final    eGFR 02/26/2024 >90  >60 mL/min/1.73m*2 Final    Calculations of estimated GFR are performed using the 2021 CKD-EPI Study Refit equation without the race variable for the IDMS-Traceable creatinine methods.  https://jasn.asnjournals.org/content/early/2021/09/22/ASN.8638337722    Calcium  "02/26/2024 9.3  8.6 - 10.3 mg/dL Final    Albumin 02/26/2024 4.3  3.4 - 5.0 g/dL Final    Alkaline Phosphatase 02/26/2024 118  33 - 120 U/L Final    Total Protein 02/26/2024 8.0  6.4 - 8.2 g/dL Final    AST 02/26/2024 23  9 - 39 U/L Final    Bilirubin, Total 02/26/2024 2.2 (H)  0.0 - 1.2 mg/dL Final    ALT 02/26/2024 16  10 - 52 U/L Final    Patients treated with Sulfasalazine may generate falsely decreased results for ALT.    LDH 02/26/2024 278 (H)  84 - 246 U/L Final    Retic % 02/26/2024 5.4 (H)  0.5 - 2.0 % Final    Retic Absolute 02/26/2024 0.101  0.022 - 0.118 x10*6/uL Final    Reticulocyte Hemoglobin 02/26/2024 41 (H)  28 - 38 pg Final    Immature Retic fraction 02/26/2024 21.0 (H)  <=16.0 % Final    Reticulocytes are measured based on a fluorescent technique. The IRF, or immature reticulocyte fraction, is the percent of reticulocytes that show medium (MFR) or high (HFR) fluorescence.  This value can be used to assess the relative maturity of the reticulocyte population in response to anemia. The \"shift reticulocytes\" are not measured by this technique, eliminating the need for their correction in the reticulocyte index.    RBC Morphology 02/26/2024 See Below   Final    Polychromasia 02/26/2024 Mild   Final    Sickle Cells 02/26/2024 Few   Final    Target Cells 02/26/2024 Many   Final    Teardrop Cells 02/26/2024 Few   Final    [place] Bodies 02/26/2024 Present   Final    Pappenheimer Bodies 02/26/2024 Present   Final                 Jose Guadalupe Nelson MD                         "

## 2024-03-04 ENCOUNTER — APPOINTMENT (OUTPATIENT)
Dept: HEMATOLOGY/ONCOLOGY | Facility: HOSPITAL | Age: 24
End: 2024-03-04
Payer: MEDICAID

## 2024-03-15 DIAGNOSIS — D57.00 SICKLE CELL DISEASE WITH CRISIS (MULTI): Primary | ICD-10-CM

## 2024-03-19 ENCOUNTER — PHARMACY VISIT (OUTPATIENT)
Dept: PHARMACY | Facility: CLINIC | Age: 24
End: 2024-03-19
Payer: MEDICAID

## 2024-03-19 ENCOUNTER — OFFICE VISIT (OUTPATIENT)
Dept: HEMATOLOGY/ONCOLOGY | Facility: HOSPITAL | Age: 24
End: 2024-03-19
Payer: MEDICAID

## 2024-03-19 ENCOUNTER — LAB (OUTPATIENT)
Dept: LAB | Facility: HOSPITAL | Age: 24
End: 2024-03-19
Payer: MEDICAID

## 2024-03-19 VITALS
WEIGHT: 178.35 LBS | TEMPERATURE: 98.1 F | DIASTOLIC BLOOD PRESSURE: 73 MMHG | HEART RATE: 94 BPM | BODY MASS INDEX: 24.13 KG/M2 | OXYGEN SATURATION: 95 % | SYSTOLIC BLOOD PRESSURE: 127 MMHG | RESPIRATION RATE: 16 BRPM

## 2024-03-19 DIAGNOSIS — J45.909 ASTHMA, UNSPECIFIED ASTHMA SEVERITY, UNSPECIFIED WHETHER COMPLICATED, UNSPECIFIED WHETHER PERSISTENT (HHS-HCC): ICD-10-CM

## 2024-03-19 DIAGNOSIS — D57.00 SICKLE CELL DISEASE WITH CRISIS (MULTI): ICD-10-CM

## 2024-03-19 LAB
25(OH)D3 SERPL-MCNC: 13 NG/ML (ref 30–100)
ALBUMIN SERPL BCP-MCNC: 4.3 G/DL (ref 3.4–5)
ALP SERPL-CCNC: 99 U/L (ref 33–120)
ALT SERPL W P-5'-P-CCNC: 24 U/L (ref 10–52)
AMPHETAMINES UR QL SCN: NORMAL
ANION GAP SERPL CALC-SCNC: 10 MMOL/L (ref 10–20)
APPEARANCE UR: CLEAR
AST SERPL W P-5'-P-CCNC: 22 U/L (ref 9–39)
BARBITURATES UR QL SCN: NORMAL
BASOPHILS # BLD AUTO: 0.02 X10*3/UL (ref 0–0.1)
BASOPHILS NFR BLD AUTO: 0.5 %
BENZODIAZ UR QL SCN: NORMAL
BILIRUB SERPL-MCNC: 2.1 MG/DL (ref 0–1.2)
BILIRUB UR STRIP.AUTO-MCNC: NEGATIVE MG/DL
BUN SERPL-MCNC: 12 MG/DL (ref 6–23)
BZE UR QL SCN: NORMAL
CALCIUM SERPL-MCNC: 9.1 MG/DL (ref 8.6–10.3)
CANNABINOIDS UR QL SCN: NORMAL
CHLORIDE SERPL-SCNC: 105 MMOL/L (ref 98–107)
CO2 SERPL-SCNC: 27 MMOL/L (ref 21–32)
COLOR UR: NORMAL
CREAT SERPL-MCNC: 0.68 MG/DL (ref 0.5–1.3)
CREAT UR-MCNC: 79.4 MG/DL (ref 20–370)
EGFRCR SERPLBLD CKD-EPI 2021: >90 ML/MIN/1.73M*2
EOSINOPHIL # BLD AUTO: 0.04 X10*3/UL (ref 0–0.7)
EOSINOPHIL NFR BLD AUTO: 1 %
ERYTHROCYTE [DISTWIDTH] IN BLOOD BY AUTOMATED COUNT: 21.1 % (ref 11.5–14.5)
FENTANYL+NORFENTANYL UR QL SCN: NORMAL
FERRITIN SERPL-MCNC: 243 NG/ML (ref 20–300)
FOLATE SERPL-MCNC: 9.1 NG/ML
GLUCOSE SERPL-MCNC: 91 MG/DL (ref 74–99)
GLUCOSE UR STRIP.AUTO-MCNC: NORMAL MG/DL
HCT VFR BLD AUTO: 21.5 % (ref 41–52)
HGB BLD-MCNC: 7.8 G/DL (ref 13.5–17.5)
HGB RETIC QN: 42 PG (ref 28–38)
HOLD SPECIMEN: NORMAL
HOWELL-JOLLY BOD BLD QL SMEAR: PRESENT
IMM GRANULOCYTES # BLD AUTO: 0.01 X10*3/UL (ref 0–0.7)
IMM GRANULOCYTES NFR BLD AUTO: 0.2 % (ref 0–0.9)
IMMATURE RETIC FRACTION: 22.2 %
IRON SATN MFR SERPL: ABNORMAL %
IRON SERPL-MCNC: 272 UG/DL (ref 35–150)
KETONES UR STRIP.AUTO-MCNC: NEGATIVE MG/DL
LDH SERPL L TO P-CCNC: 266 U/L (ref 84–246)
LEUKOCYTE ESTERASE UR QL STRIP.AUTO: NEGATIVE
LYMPHOCYTES # BLD AUTO: 1.91 X10*3/UL (ref 1.2–4.8)
LYMPHOCYTES NFR BLD AUTO: 46.2 %
MCH RBC QN AUTO: 46.4 PG (ref 26–34)
MCHC RBC AUTO-ENTMCNC: 36.3 G/DL (ref 32–36)
MCV RBC AUTO: 128 FL (ref 80–100)
METHADONE UR QL SCN: NORMAL
MICROALBUMIN UR-MCNC: 14.2 MG/L
MICROALBUMIN/CREAT UR: 17.9 UG/MG CREAT
MONOCYTES # BLD AUTO: 0.25 X10*3/UL (ref 0.1–1)
MONOCYTES NFR BLD AUTO: 6.1 %
NEUTROPHILS # BLD AUTO: 1.9 X10*3/UL (ref 1.2–7.7)
NEUTROPHILS NFR BLD AUTO: 46 %
NITRITE UR QL STRIP.AUTO: NEGATIVE
NRBC BLD-RTO: 25.2 /100 WBCS (ref 0–0)
OPIATES UR QL SCN: NORMAL
OXYCODONE+OXYMORPHONE UR QL SCN: NORMAL
PAPPENHEIMER BOD BLD QL SMEAR: PRESENT
PCP UR QL SCN: NORMAL
PH UR STRIP.AUTO: 6.5 [PH]
PLATELET # BLD AUTO: 169 X10*3/UL (ref 150–450)
POLYCHROMASIA BLD QL SMEAR: NORMAL
POTASSIUM SERPL-SCNC: 3.9 MMOL/L (ref 3.5–5.3)
PROT SERPL-MCNC: 7.9 G/DL (ref 6.4–8.2)
PROT UR STRIP.AUTO-MCNC: NEGATIVE MG/DL
RBC # BLD AUTO: 1.68 X10*6/UL (ref 4.5–5.9)
RBC # UR STRIP.AUTO: NEGATIVE /UL
RBC MORPH BLD: NORMAL
RETICS #: 0.07 X10*6/UL (ref 0.02–0.12)
RETICS/RBC NFR AUTO: 4.1 % (ref 0.5–2)
SCHISTOCYTES BLD QL SMEAR: NORMAL
SICKLE CELLS BLD QL SMEAR: NORMAL
SODIUM SERPL-SCNC: 138 MMOL/L (ref 136–145)
SP GR UR STRIP.AUTO: 1.01
TARGETS BLD QL SMEAR: NORMAL
TIBC SERPL-MCNC: ABNORMAL UG/DL
TSH SERPL-ACNC: 1.53 MIU/L (ref 0.44–3.98)
UIBC SERPL-MCNC: <55 UG/DL (ref 110–370)
URATE SERPL-MCNC: 5 MG/DL (ref 4–7.5)
UROBILINOGEN UR STRIP.AUTO-MCNC: NORMAL MG/DL
VIT B12 SERPL-MCNC: 303 PG/ML (ref 211–911)
WBC # BLD AUTO: 4.1 X10*3/UL (ref 4.4–11.3)

## 2024-03-19 PROCEDURE — 85045 AUTOMATED RETICULOCYTE COUNT: CPT

## 2024-03-19 PROCEDURE — 82306 VITAMIN D 25 HYDROXY: CPT

## 2024-03-19 PROCEDURE — 82746 ASSAY OF FOLIC ACID SERUM: CPT

## 2024-03-19 PROCEDURE — RXMED WILLOW AMBULATORY MEDICATION CHARGE

## 2024-03-19 PROCEDURE — 84443 ASSAY THYROID STIM HORMONE: CPT

## 2024-03-19 PROCEDURE — 82607 VITAMIN B-12: CPT

## 2024-03-19 PROCEDURE — 83020 HEMOGLOBIN ELECTROPHORESIS: CPT | Performed by: PATHOLOGY

## 2024-03-19 PROCEDURE — 84075 ASSAY ALKALINE PHOSPHATASE: CPT

## 2024-03-19 PROCEDURE — 81003 URINALYSIS AUTO W/O SCOPE: CPT | Mod: CCI

## 2024-03-19 PROCEDURE — 84550 ASSAY OF BLOOD/URIC ACID: CPT

## 2024-03-19 PROCEDURE — 80307 DRUG TEST PRSMV CHEM ANLYZR: CPT

## 2024-03-19 PROCEDURE — 83615 LACTATE (LD) (LDH) ENZYME: CPT

## 2024-03-19 PROCEDURE — 99214 OFFICE O/P EST MOD 30 MIN: CPT | Performed by: INTERNAL MEDICINE

## 2024-03-19 PROCEDURE — 36415 COLL VENOUS BLD VENIPUNCTURE: CPT

## 2024-03-19 PROCEDURE — 82043 UR ALBUMIN QUANTITATIVE: CPT

## 2024-03-19 PROCEDURE — 85025 COMPLETE CBC W/AUTO DIFF WBC: CPT

## 2024-03-19 PROCEDURE — 83021 HEMOGLOBIN CHROMOTOGRAPHY: CPT

## 2024-03-19 PROCEDURE — 83540 ASSAY OF IRON: CPT

## 2024-03-19 PROCEDURE — 82728 ASSAY OF FERRITIN: CPT

## 2024-03-19 PROCEDURE — 1036F TOBACCO NON-USER: CPT | Performed by: INTERNAL MEDICINE

## 2024-03-19 RX ORDER — FLUTICASONE PROPIONATE AND SALMETEROL 50; 250 UG/1; UG/1
1 POWDER RESPIRATORY (INHALATION) EVERY 12 HOURS
Qty: 60 EACH | Refills: 2 | Status: SHIPPED | OUTPATIENT
Start: 2024-03-19 | End: 2024-06-30

## 2024-03-19 RX ORDER — HYDROXYUREA 500 MG/1
2000 CAPSULE ORAL DAILY
Qty: 120 CAPSULE | Refills: 3 | Status: SHIPPED | OUTPATIENT
Start: 2024-03-19 | End: 2024-07-17

## 2024-03-19 RX ORDER — FOLIC ACID 1 MG/1
1 TABLET ORAL DAILY
COMMUNITY
End: 2024-03-19 | Stop reason: SDUPTHER

## 2024-03-19 RX ORDER — FOLIC ACID 1 MG/1
1 TABLET ORAL DAILY
Qty: 90 TABLET | Refills: 2 | Status: SHIPPED | OUTPATIENT
Start: 2024-03-19

## 2024-03-19 RX ORDER — AMOXICILLIN 250 MG/1
250 CAPSULE ORAL EVERY 12 HOURS
Qty: 60 CAPSULE | Refills: 11 | Status: SHIPPED | OUTPATIENT
Start: 2024-03-19 | End: 2025-03-19

## 2024-03-19 RX ORDER — OXYCODONE HYDROCHLORIDE 10 MG/1
10 TABLET ORAL EVERY 6 HOURS PRN
Qty: 15 TABLET | Refills: 0 | Status: SHIPPED | OUTPATIENT
Start: 2024-03-19 | End: 2024-05-01 | Stop reason: SDUPTHER

## 2024-03-19 ASSESSMENT — ENCOUNTER SYMPTOMS
SCLERAL ICTERUS: 0
NEUROLOGICAL NEGATIVE: 1
FATIGUE: 1
GASTROINTESTINAL NEGATIVE: 1

## 2024-03-19 ASSESSMENT — PAIN SCALES - GENERAL: PAINLEVEL: 0-NO PAIN

## 2024-03-19 NOTE — PATIENT INSTRUCTIONS
Good Seeing you today.  Continue medications as prescribed.  Healthy diet and Drink plenty of water-UNLESS RESTRICTIONS.  Stay Active, but Avoid strenuous activity.   Stay Warm.  Please have labs drawn today.    Please schedule with pulmonary, Primary care and Ophthalmology, Dentist referral  Call Psychiatry to reschedule for further evaluation ADD.    Follow-up in 3 months.   Call office any new concerns.

## 2024-03-19 NOTE — PROGRESS NOTES
COMPREHENSIVE SICKLE CELL CLINIC NOTE   Date of Encounter: @today@    Name:  Yakov Jimenez  Age: 23  MRN: [id number]  Sickle Cell Genotype: SS  Name of PCP: Referral Submitted      ASSESSMENT AND PLAN  Yakov Jimenez is 23 y.o. @gender with     History of Hb SS Sickle cell disease with occasionally pain. Today he is pain free. Pain regimen will be as follows  Oral Tylenol and or ibuprofen as needed for mild to moderate pain   Oral Oxycodone IR 10 mg.  Discussed non pharmacologic methods of pain control   Reviewed OARRS     Encounter for management of disease modifying therapy. On Hydroxyurea. We will proceed without changes to current management     Chronic anemia secondary to SCD with a hemoglobin of 7-8 baseline. This is around his baseline HB. Current labs pending.   Daily folic acid     Has not been using his supplement oxygen in years and is convinced that he does not need it-he has an upcoming appointment with pulmonary. History of reactive airway disease, PFT ws negative for airway obstruction. Sleep study was negative for significant sleep-related breathing disorder.     Constipation is well controlled  - continue current laxatives as needed.     6. Follow up office visit   Subspecialty visits   Labs needed at next OV will be a CBC, CMP, Ferritin, LDH, retic, type and screen , Ionized calcium, Hemoglobin identification         Interval History    Subjective    HPI  Yakov presents for comprehensive visit he denies pain today. He complains of fatigue, but this has been chronic for him. He states he takes his oxycodone only as needed and takes it once daily if needed. Today he states he has some swelling to his right lower arm. He denies trauma or injury to his arm. He denies chest pains, feeling short of breath or any neurological deficits. Appetite normal bowel and bladder normal.          Review of Systems   Constitutional:  Positive for fatigue.   Eyes:  Negative for icterus.   Gastrointestinal:  Negative.    Genitourinary: Negative.     Neurological: Negative.         Objective   BSA: 2.03 meters squared  /73   Pulse 94   Temp 36.7 °C (98.1 °F)   Resp 16   Wt 80.9 kg (178 lb 5.6 oz)   SpO2 95%   BMI 24.13 kg/m²      has a past surgical history that includes Other surgical history (07/14/2022) and MR angio head wo IV contrast (7/26/2019).  Family History   Problem Relation Name Age of Onset    Asthma Mother       Oncology History    No history exists.       Yakov Jimenez  reports that he has never smoked. He has never used smokeless tobacco.  He  reports no history of alcohol use.  He  reports no history of drug use.    Physical Exam  Vitals reviewed.   Constitutional:       Appearance: Normal appearance.   HENT:      Mouth/Throat:      Mouth: Mucous membranes are moist.      Pharynx: Oropharynx is clear.   Eyes:      General: Scleral icterus (mild) present.   Cardiovascular:      Rate and Rhythm: Normal rate and regular rhythm.   Pulmonary:      Effort: Pulmonary effort is normal.      Breath sounds: Normal breath sounds.   Abdominal:      General: Bowel sounds are normal. There is no distension.      Palpations: Abdomen is soft.      Tenderness: There is no abdominal tenderness. There is no guarding or rebound.   Musculoskeletal:         General: Normal range of motion.      Cervical back: Normal range of motion and neck supple.      Right lower leg: No edema.      Left lower leg: No edema.      Comments: Right forearm with mild soft tissue swelling. Not warm to touch, no bruising or redness.     Lymphadenopathy:      Cervical: No cervical adenopathy.   Skin:     General: Skin is warm and dry.   Neurological:      Mental Status: He is alert and oriented to person, place, and time.   Psychiatric:         Mood and Affect: Mood normal.         Thought Content: Thought content normal.        Immunizations: Has not had influenza vaccine since 2021,last Covid vaccine 1/11/2022-discussed the  importance of staying updated with immunizations and  health screenings. Referred to Primary Care.      Disease modifying therapy: Hydroxyurea 2000mg daily.    Hospitalizations in past 12 months: 2023-1/3/2024.  ED: 2023.     Cardio/Pulmonary   Last Echo date 2022  Results   EF: 60-60%  TRJV: 2.71 m/s  :   Cardiac MRI 3/17/2022  Results   . Dilated LV size (EDVi 113 ml/m2) with normal systolic function   (LVEF 66%) with no regional wall motion  abnormalities  2. No evidence of myocardial iron overload on T2*-weighted imaging.   Mildly reduced liver T2* (8 ms)  suggestive of mild liver iron overload.  3. No evidence of myocardial fibrosis, infiltration or infarction   based on late gadolinium imaging    [id number]  Sinus tachycardia  Left ventricular hypertrophy with repolarization abnormality  Nonspecific T wave abnormality  No significant change was found  Cardiology follow up (Provider and date)-3/2022 Dr. Ty Herrera    History of Asthma/restrictive lung disease   -Last PFT-2023 Spirometry demonstrates no airway obstruction,   -6 MWD      Sleep study 2023  Results-No significant sleep-related breathing disorder is observed. The William wa 91.0%.         Renal/Nephrology  Urinalysis-WNL   Urine albuminuria-WNL   Renal panel-WNL   Renal disease present (NO)    Eyes  Last eye examination: -ophthalmology referral submitted.       Dental examination  Last Dental Examination:   Next dental examination: dental location and Information list given- patient to schedule.     Iron overload   Recent ferritin level: WNL  Chelation therapy: N/A      Neuro/Psych  Depression and Anxiety Screening: denies- Seen by psychiatry has been evaluated to ADD.    Social Issue  Needs addressed: Declines  Occupation: In school studying to become a .    Kori Anna, APRN-CNP

## 2024-03-20 PROCEDURE — RXMED WILLOW AMBULATORY MEDICATION CHARGE

## 2024-03-21 LAB
HEMOGLOBIN A2: 4.2 % (ref 2–3.5)
HEMOGLOBIN F: 13.6 % (ref 0–2)
HEMOGLOBIN IDENTIFICATION INTERPRETATION: ABNORMAL
HEMOGLOBIN S: 82.2 %
PATH REVIEW-HGB IDENTIFICATION: ABNORMAL

## 2024-03-26 ENCOUNTER — PHARMACY VISIT (OUTPATIENT)
Dept: PHARMACY | Facility: CLINIC | Age: 24
End: 2024-03-26
Payer: MEDICAID

## 2024-04-08 ENCOUNTER — APPOINTMENT (OUTPATIENT)
Dept: HEMATOLOGY/ONCOLOGY | Facility: HOSPITAL | Age: 24
End: 2024-04-08
Payer: MEDICAID

## 2024-04-10 ENCOUNTER — SOCIAL WORK (OUTPATIENT)
Dept: HEMATOLOGY/ONCOLOGY | Facility: HOSPITAL | Age: 24
End: 2024-04-10
Payer: MEDICAID

## 2024-04-12 NOTE — PROGRESS NOTES
Comprehensive Care Social Work Assessment    Living Situation/Social Supports:  Patient lives with grandma and grandpa in a home that they rent. Patient's grandparents provide funds for bills and pt assists with processing them. Older brother and nieces, ages 9 and 10, live 15 minutes away and pt enjoys spending time with them, often helps with caregiving duties such as school pickup. Patient states they are currently trying to find a new place to live with their grandparents. Patient and grandparents have applied for the Housing [employer] multiple times and have received no response. Patient stated they signed up to be communicated with by email and have been checking their spam.     Mental Health History:  Patient denied any current mental health issues, stating they have felt good lately. Patient mentioned they had seen “neurology” a while ago for “possible anxiety” and would like to see neurology again, but prefers to stay within the  system. Per chart review, pt did see Dr. Mathews in September 2023 to begin an ADHD evaluation but no follow up is visible in the chart. Pt encouraged to call scheduling line to check availability for historical provider.     Financial:   Patient is not currently employed, grandparents provide financial support. Patient recently applied for a hospitality job at [employer] and is in the review process. 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 After graduation, they would like to design their own [employer] game. Patient has received [employer] recently for utility assistance, denied food insecurity at this time.     Advanced Directives:  Patient has no advanced directives on file. REDDY provided education on POA, end-of-life choices, and [place] Next of Kin laws. Pt interested in completing these documents after reading over with family. REDDY will leave copy at [employer]  for patient to .     Patient Goals:  When asked about  health goals for the coming care, patient stated “not much right now”. SWK provided possible ideas and encouraged pt to continue thinking about it.     No other psychosocial needs were identified at this time. SWK will continue to follow as needed.     Adriana Miles, MSW Student   PRAVEEN [facility]idad

## 2024-04-15 ENCOUNTER — OFFICE VISIT (OUTPATIENT)
Dept: PRIMARY CARE | Facility: CLINIC | Age: 24
End: 2024-04-15
Payer: MEDICAID

## 2024-04-15 VITALS
BODY MASS INDEX: 24.79 KG/M2 | HEART RATE: 92 BPM | TEMPERATURE: 97.9 F | OXYGEN SATURATION: 98 % | HEIGHT: 72 IN | DIASTOLIC BLOOD PRESSURE: 65 MMHG | WEIGHT: 183 LBS | SYSTOLIC BLOOD PRESSURE: 120 MMHG | RESPIRATION RATE: 14 BRPM

## 2024-04-15 DIAGNOSIS — Z11.3 SCREENING FOR STDS (SEXUALLY TRANSMITTED DISEASES): Primary | ICD-10-CM

## 2024-04-15 DIAGNOSIS — Z72.820 SLEEP DEFICIENT: ICD-10-CM

## 2024-04-15 DIAGNOSIS — Z13.220 SCREENING FOR LIPID DISORDERS: ICD-10-CM

## 2024-04-15 DIAGNOSIS — E55.9 VITAMIN D DEFICIENCY: ICD-10-CM

## 2024-04-15 PROCEDURE — 99214 OFFICE O/P EST MOD 30 MIN: CPT | Performed by: NURSE PRACTITIONER

## 2024-04-15 PROCEDURE — RXMED WILLOW AMBULATORY MEDICATION CHARGE

## 2024-04-15 RX ORDER — ERGOCALCIFEROL 1.25 MG/1
50000 CAPSULE ORAL
Qty: 12 CAPSULE | Refills: 0 | Status: SHIPPED | OUTPATIENT
Start: 2024-04-21 | End: 2024-07-14

## 2024-04-15 ASSESSMENT — ENCOUNTER SYMPTOMS
DEPRESSION: 0
LOSS OF SENSATION IN FEET: 0
OCCASIONAL FEELINGS OF UNSTEADINESS: 0

## 2024-04-15 ASSESSMENT — PATIENT HEALTH QUESTIONNAIRE - PHQ9
1. LITTLE INTEREST OR PLEASURE IN DOING THINGS: NOT AT ALL
2. FEELING DOWN, DEPRESSED OR HOPELESS: NOT AT ALL
SUM OF ALL RESPONSES TO PHQ9 QUESTIONS 1 AND 2: 0

## 2024-04-15 ASSESSMENT — PAIN SCALES - GENERAL: PAINLEVEL: 0-NO PAIN

## 2024-04-15 NOTE — PATIENT INSTRUCTIONS
Thank you for coming in for your visit today!    Please follow up in 9-12 months or sooner if needed.    Come to [facility] while fasting for 10 hours to have your blood and urine testing completed.     Focus on increasing exercise during the day.  Develop a sleep routine. Consider including vanilla tea as discussed.  Avoid screens for 30-60 minutes prior to desired bedtime.     Call to schedule dental cleaning.    Keep your upcoming appointment for eye exam.     Call 911 or go to the emergency room if you have pain in your chest, difficulty breathing, or other life threatening symptoms.

## 2024-04-15 NOTE — PROGRESS NOTES
"Subjective   Yakov Jimenez is a 24 y.o. male who presents for Establish Care.  HPI  Mr. Jimenez is a 25 yo M here today for establishing care.  Has history of sickle cell disease, has been complicated with infections, chronic anemia, anaphylaxis to ceftriaxone. He feels well supported in the SS clinic currently. Denies current pain or exacerbation. He is here today to establish primary care and to address preventative measures.     Grandparents are current advocates for his health. He feels well supported. Lost his mom to sickle cell several years ago.   Was living in [place] from [age]. Moved because of his Mom's health at the time.     Feels like diet is balanced. He likes a variety of foods, enjoys vegetables, eats a decent amount of fiber. Does have a sweet tooth, particularly enjoying vanilla and caramel flavored things Denies polydipsia, polyuria, headache, blurred vision, or lightheadedness  Sleep  Typically will get at least 6 hours of sleep a night.   Notes that he \"sleeps like a log\"   Will usually wake at the 6 hours loan.   He would like to continue to extend his sleep cycles.   He is not currently exercising routinely but is very interested in developing more of an exercise routine.    Feels like the fatigue does not impact his daily activities.   Did have a recently drawn vitamin D value of 13. Has previously taken high dose supplementation. Has not be prescribed recently.     All systems reviewed. Review of systems negative except for noted positives in HPI    Objective     /65   Pulse 92   Temp 36.6 °C (97.9 °F)   Resp 14   Ht 1.829 m (6')   Wt 83 kg (183 lb)   SpO2 98%   BMI 24.82 kg/m²    Vital signs noted and reviewed.       Physical Exam  Constitutional:       Appearance: Normal appearance.   Cardiovascular:      Rate and Rhythm: Normal rate and regular rhythm.   Pulmonary:      Effort: Pulmonary effort is normal. No respiratory distress.      Breath sounds: Normal breath " sounds.   Skin:     General: Skin is warm and dry.   Neurological:      Mental Status: He is oriented to person, place, and time.   Psychiatric:         Mood and Affect: Mood normal.             Assessment/Plan   Problem List Items Addressed This Visit    None  Visit Diagnoses       Screening for STDs (sexually transmitted diseases)    -  Primary    Relevant Orders    C. Trachomatis / N. Gonorrhoeae, Amplified Detection    HIV 1/2 Antigen/Antibody Screen with Reflex to Confirmation    Syphilis Screen with Reflex    Trichomonas vaginalis, Nucleic Acid Detection    Screening for lipid disorders        Relevant Orders    Lipid Panel    Sleep deficient        Vitamin D deficiency        Relevant Medications    ergocalciferol (Vitamin D-2) 1.25 MG (78014 UT) capsule (Start on 4/21/2024)

## 2024-04-18 ENCOUNTER — PHARMACY VISIT (OUTPATIENT)
Dept: PHARMACY | Facility: CLINIC | Age: 24
End: 2024-04-18
Payer: MEDICAID

## 2024-04-26 PROCEDURE — RXMED WILLOW AMBULATORY MEDICATION CHARGE

## 2024-05-01 ENCOUNTER — PHARMACY VISIT (OUTPATIENT)
Dept: PHARMACY | Facility: CLINIC | Age: 24
End: 2024-05-01
Payer: MEDICAID

## 2024-05-01 ENCOUNTER — TELEPHONE (OUTPATIENT)
Dept: ADMISSION | Facility: HOSPITAL | Age: 24
End: 2024-05-01
Payer: MEDICAID

## 2024-05-01 DIAGNOSIS — D57.00 SICKLE CELL DISEASE WITH CRISIS (MULTI): ICD-10-CM

## 2024-05-01 PROCEDURE — RXMED WILLOW AMBULATORY MEDICATION CHARGE

## 2024-05-01 RX ORDER — OXYCODONE HYDROCHLORIDE 10 MG/1
10 TABLET ORAL EVERY 6 HOURS PRN
Qty: 15 TABLET | Refills: 0 | Status: SHIPPED | OUTPATIENT
Start: 2024-05-01 | End: 2024-06-05 | Stop reason: SDUPTHER

## 2024-05-01 NOTE — TELEPHONE ENCOUNTER
Yakov Jimenez called the refill line for Oxycodone 10mg. Requesting refills be sent to [facility] pharmacy; message sent to Sickle Cell team to submit.

## 2024-05-02 ENCOUNTER — PHARMACY VISIT (OUTPATIENT)
Dept: PHARMACY | Facility: CLINIC | Age: 24
End: 2024-05-02
Payer: MEDICAID

## 2024-05-24 ENCOUNTER — TELEPHONE (OUTPATIENT)
Dept: HEMATOLOGY/ONCOLOGY | Facility: HOSPITAL | Age: 24
End: 2024-05-24

## 2024-05-24 PROCEDURE — RXMED WILLOW AMBULATORY MEDICATION CHARGE

## 2024-05-28 ENCOUNTER — PHARMACY VISIT (OUTPATIENT)
Dept: PHARMACY | Facility: CLINIC | Age: 24
End: 2024-05-28
Payer: MEDICAID

## 2024-06-05 ENCOUNTER — TELEPHONE (OUTPATIENT)
Dept: HEMATOLOGY/ONCOLOGY | Facility: HOSPITAL | Age: 24
End: 2024-06-05
Payer: MEDICAID

## 2024-06-05 DIAGNOSIS — D57.00 SICKLE CELL DISEASE WITH CRISIS (MULTI): ICD-10-CM

## 2024-06-05 PROCEDURE — RXMED WILLOW AMBULATORY MEDICATION CHARGE

## 2024-06-10 ENCOUNTER — PHARMACY VISIT (OUTPATIENT)
Dept: PHARMACY | Facility: CLINIC | Age: 24
End: 2024-06-10
Payer: MEDICAID

## 2024-06-10 PROCEDURE — RXMED WILLOW AMBULATORY MEDICATION CHARGE

## 2024-06-10 RX ORDER — OXYCODONE HYDROCHLORIDE 10 MG/1
10 TABLET ORAL EVERY 6 HOURS PRN
Qty: 15 TABLET | Refills: 0 | Status: SHIPPED | OUTPATIENT
Start: 2024-06-10

## 2024-06-11 PROCEDURE — RXMED WILLOW AMBULATORY MEDICATION CHARGE

## 2024-06-13 ENCOUNTER — PHARMACY VISIT (OUTPATIENT)
Dept: PHARMACY | Facility: CLINIC | Age: 24
End: 2024-06-13
Payer: MEDICAID

## 2024-06-19 ENCOUNTER — LAB (OUTPATIENT)
Dept: LAB | Facility: HOSPITAL | Age: 24
End: 2024-06-19
Payer: MEDICAID

## 2024-06-19 ENCOUNTER — OFFICE VISIT (OUTPATIENT)
Dept: HEMATOLOGY/ONCOLOGY | Facility: HOSPITAL | Age: 24
End: 2024-06-19
Payer: MEDICAID

## 2024-06-19 VITALS
HEART RATE: 79 BPM | RESPIRATION RATE: 16 BRPM | BODY MASS INDEX: 23.95 KG/M2 | TEMPERATURE: 98.1 F | OXYGEN SATURATION: 99 % | SYSTOLIC BLOOD PRESSURE: 103 MMHG | WEIGHT: 176.59 LBS | DIASTOLIC BLOOD PRESSURE: 60 MMHG

## 2024-06-19 DIAGNOSIS — Z11.3 SCREENING FOR STDS (SEXUALLY TRANSMITTED DISEASES): ICD-10-CM

## 2024-06-19 DIAGNOSIS — D57.00 SICKLE CELL DISEASE WITH CRISIS (MULTI): ICD-10-CM

## 2024-06-19 LAB
ALBUMIN SERPL BCP-MCNC: 4.3 G/DL (ref 3.4–5)
ALP SERPL-CCNC: 86 U/L (ref 33–120)
ALT SERPL W P-5'-P-CCNC: 34 U/L (ref 10–52)
ANION GAP SERPL CALC-SCNC: 11 MMOL/L (ref 10–20)
AST SERPL W P-5'-P-CCNC: 22 U/L (ref 9–39)
BASOPHILS # BLD AUTO: 0.03 X10*3/UL (ref 0–0.1)
BASOPHILS NFR BLD AUTO: 0.7 %
BILIRUB SERPL-MCNC: 1.1 MG/DL (ref 0–1.2)
BNP SERPL-MCNC: 7 PG/ML (ref 0–99)
BUN SERPL-MCNC: 12 MG/DL (ref 6–23)
CALCIUM SERPL-MCNC: 9.1 MG/DL (ref 8.6–10.3)
CHLORIDE SERPL-SCNC: 103 MMOL/L (ref 98–107)
CO2 SERPL-SCNC: 28 MMOL/L (ref 21–32)
CREAT SERPL-MCNC: 0.74 MG/DL (ref 0.5–1.3)
DACRYOCYTES BLD QL SMEAR: NORMAL
EGFRCR SERPLBLD CKD-EPI 2021: >90 ML/MIN/1.73M*2
EOSINOPHIL # BLD AUTO: 0.05 X10*3/UL (ref 0–0.7)
EOSINOPHIL NFR BLD AUTO: 1.2 %
ERYTHROCYTE [DISTWIDTH] IN BLOOD BY AUTOMATED COUNT: 20.2 % (ref 11.5–14.5)
GLUCOSE SERPL-MCNC: 86 MG/DL (ref 74–99)
HCT VFR BLD AUTO: 21.2 % (ref 41–52)
HEMOGLOBIN A2: 3.2 % (ref 2–3.5)
HEMOGLOBIN F: 18 % (ref 0–2)
HEMOGLOBIN IDENTIFICATION INTERPRETATION: ABNORMAL
HEMOGLOBIN S: 78.8 %
HGB BLD-MCNC: 7.7 G/DL (ref 13.5–17.5)
HGB RETIC QN: 44 PG (ref 28–38)
HIV 1+2 AB+HIV1 P24 AG SERPL QL IA: NONREACTIVE
HOWELL-JOLLY BOD BLD QL SMEAR: PRESENT
IMM GRANULOCYTES # BLD AUTO: 0.01 X10*3/UL (ref 0–0.7)
IMM GRANULOCYTES NFR BLD AUTO: 0.2 % (ref 0–0.9)
IMMATURE RETIC FRACTION: 27.4 %
LDH SERPL L TO P-CCNC: 222 U/L (ref 84–246)
LYMPHOCYTES # BLD AUTO: 1.9 X10*3/UL (ref 1.2–4.8)
LYMPHOCYTES NFR BLD AUTO: 46.7 %
MCH RBC QN AUTO: 46.7 PG (ref 26–34)
MCHC RBC AUTO-ENTMCNC: 36.3 G/DL (ref 32–36)
MCV RBC AUTO: 129 FL (ref 80–100)
MONOCYTES # BLD AUTO: 0.23 X10*3/UL (ref 0.1–1)
MONOCYTES NFR BLD AUTO: 5.7 %
NEUTROPHILS # BLD AUTO: 1.85 X10*3/UL (ref 1.2–7.7)
NEUTROPHILS NFR BLD AUTO: 45.5 %
NRBC BLD-RTO: 33.2 /100 WBCS (ref 0–0)
PAPPENHEIMER BOD BLD QL SMEAR: PRESENT
PATH REVIEW-HGB IDENTIFICATION: ABNORMAL
PLATELET # BLD AUTO: 210 X10*3/UL (ref 150–450)
POLYCHROMASIA BLD QL SMEAR: NORMAL
POTASSIUM SERPL-SCNC: 3.8 MMOL/L (ref 3.5–5.3)
PROT SERPL-MCNC: 7.9 G/DL (ref 6.4–8.2)
RBC # BLD AUTO: 1.65 X10*6/UL (ref 4.5–5.9)
RBC MORPH BLD: NORMAL
RETICS #: 0.04 X10*6/UL (ref 0.02–0.12)
RETICS/RBC NFR AUTO: 2.5 % (ref 0.5–2)
SCHISTOCYTES BLD QL SMEAR: NORMAL
SICKLE CELLS BLD QL SMEAR: NORMAL
SODIUM SERPL-SCNC: 138 MMOL/L (ref 136–145)
TARGETS BLD QL SMEAR: NORMAL
TREPONEMA PALLIDUM IGG+IGM AB [PRESENCE] IN SERUM OR PLASMA BY IMMUNOASSAY: NONREACTIVE
WBC # BLD AUTO: 4.1 X10*3/UL (ref 4.4–11.3)

## 2024-06-19 PROCEDURE — 99214 OFFICE O/P EST MOD 30 MIN: CPT | Performed by: INTERNAL MEDICINE

## 2024-06-19 PROCEDURE — 83880 ASSAY OF NATRIURETIC PEPTIDE: CPT

## 2024-06-19 PROCEDURE — 85025 COMPLETE CBC W/AUTO DIFF WBC: CPT

## 2024-06-19 PROCEDURE — 87389 HIV-1 AG W/HIV-1&-2 AB AG IA: CPT

## 2024-06-19 PROCEDURE — 36415 COLL VENOUS BLD VENIPUNCTURE: CPT

## 2024-06-19 PROCEDURE — 83615 LACTATE (LD) (LDH) ENZYME: CPT

## 2024-06-19 PROCEDURE — 85045 AUTOMATED RETICULOCYTE COUNT: CPT

## 2024-06-19 PROCEDURE — 87661 TRICHOMONAS VAGINALIS AMPLIF: CPT

## 2024-06-19 PROCEDURE — 86780 TREPONEMA PALLIDUM: CPT

## 2024-06-19 PROCEDURE — 83021 HEMOGLOBIN CHROMOTOGRAPHY: CPT

## 2024-06-19 PROCEDURE — 87491 CHLMYD TRACH DNA AMP PROBE: CPT

## 2024-06-19 PROCEDURE — 84075 ASSAY ALKALINE PHOSPHATASE: CPT

## 2024-06-19 ASSESSMENT — ENCOUNTER SYMPTOMS
APPETITE CHANGE: 0
NERVOUS/ANXIOUS: 0
MYALGIAS: 0
EXTREMITY WEAKNESS: 0
NAUSEA: 0
DYSURIA: 0
DIARRHEA: 0
VOMITING: 0
BACK PAIN: 0
UNEXPECTED WEIGHT CHANGE: 0
NUMBNESS: 0
CONSTIPATION: 0
HEADACHES: 0
ABDOMINAL PAIN: 0
FLANK PAIN: 0
SCLERAL ICTERUS: 0
LEG SWELLING: 0
EYE PROBLEMS: 0
FREQUENCY: 0
SORE THROAT: 0
CHILLS: 0
HEMATOLOGIC/LYMPHATIC NEGATIVE: 1
WOUND: 0
COUGH: 0
ARTHRALGIAS: 0
LIGHT-HEADEDNESS: 0
FEVER: 0
DEPRESSION: 0
FATIGUE: 1
BLOOD IN STOOL: 0
PALPITATIONS: 0
WHEEZING: 0
HEMOPTYSIS: 0

## 2024-06-19 ASSESSMENT — PAIN SCALES - GENERAL: PAINLEVEL: 0-NO PAIN

## 2024-06-19 NOTE — PROGRESS NOTES
SICKLE CELL OUTPATIENT NOTE  Patient ID: Yakov Jimenez   Visit Type: Follow up visit       - check vit D level at RTC   - pulm appt needed- pt navigator to assist   - dental referral needed - pt navigator to assist  - labs stable    ASSESSMENT AND PLAN  Yakov Jimenez is 24 y.o. male with     History of Hb SS Sickle cell disease in steady state and without acute sickle cell complications including pain. Pain regimen will be as follows  Oral Tylenol and or ibuprofen/naprosyn as needed for mild to moderate pain   Oral oxycodone 10 mg every 4-6 hours prn and patient was given a new prescription   Non pharmacologic methods of pain control   Reviewed OARRS  Narcan available at all times and reviewed indications    Encounter for management of disease modifying therapy. Yakov Jimenez is currently on Hydroxyurea and continues to tolerate this without any major side effects    Continue hydroxyurea 2000 mg daily   Follow up on CBC and CMP for toxicity monitoring good response to Hydrea- Hb F 18%    Chronic anemia secondary to SCD with Hb of 8.4 g/dl  Daily folic acid     History of reactive airway disease. PFT was negative for airway obstruction, but had ULQ90-15% predicted of 62%. He is also on  ICS and not required albuterol. Sleep study was negative for significant sleep-related breathing disorder. The Sp02 isak was 91.0% nocturnal hypoxia on supplemental night time oxygen.    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 He will  need a CBC and CMP         Chief Complaint: Follow up for HB SS SCD      Interval History: Yakov is present, in clinic today for follow-up of hemoglobin SS sickle cell disease. He is doing okay, has a tooth ache, just started this am. Has not seen the dentist in about 4 years.  Looking for a job right now and planning to complete school in the fall. He recently lost a family member at this changed things at home for a bit, doing better now. Missed pulm appt in March, needs to reschedule. Taking hydrea daily.  No recent ED visit or admissions.             Review of System:   Review of Systems   Constitutional:  Positive for fatigue. Negative for appetite change, chills, fever and unexpected weight change.   HENT:   Negative for nosebleeds and sore throat.    Eyes:  Negative for eye problems and icterus.   Respiratory:  Negative for cough, hemoptysis and wheezing.    Cardiovascular:  Negative for chest pain, leg swelling and palpitations.   Gastrointestinal:  Negative for abdominal pain, blood in stool, constipation, diarrhea, nausea and vomiting.   Genitourinary:  Negative for dysuria, frequency and nocturia.    Musculoskeletal:  Negative for arthralgias, back pain, flank pain, gait problem and myalgias.   Skin:  Negative for rash and wound.   Neurological:  Negative for extremity weakness, gait problem, headaches, light-headedness and numbness.   Hematological: Negative.    Psychiatric/Behavioral:  Negative for depression. The patient is not nervous/anxious.       Allergies:   Allergies   Allergen Reactions    Ceftriaxone Anaphylaxis     Current Medications:   Outpatient Encounter Medications as of 10/23/2023   Medication Sig    acetaminophen (Tylenol) 500 mg tablet Take 1-2 tablets (500-1,000 mg) by mouth once daily as needed.    naproxen (Naprosyn) 500 mg tablet Take by mouth.  May take one Naproxen with one tylenol for pain if tylenol alone doesn't work for mild to moderate sickle cell pain    Advair Diskus 250-50 mcg/dose Diskus inhaler Inhale 1 puff every 12 hours.    albuterol 90 mcg/actuation inhaler Inhale 1-2 puffs  EVERY 4 TO 6 HOURS if needed for wheezing..    amoxicillin (Amoxil) 250 mg capsule TAKE 1 CAPSULE BY MOUTH EVERY 12 HOURS    calcium carbonate (Tums) 200 mg calcium  chewable tablet Chew 1 tablet (500 mg) if needed.    ergocalciferol (Vitamin D-2) 1.25 MG (61717 UT) capsule     folic acid (Folvite) 1 mg tablet TAKE 1 TABLET BY MOUTH ONCE A DAY    hydroxyurea (Hydrea) 500 mg capsule TAKE 4 CAPSULES BY MOUTH ONCE DAILY    melatonin 5 mg tablet     naloxone (Narcan) 4 mg/0.1 mL nasal spray INSTILL 1 SPRAY IN ONE NOSTRIL AS NEEDED FOR ACCIDENTAL OPIOID OVERDOSE; REPEAT WITH SECOND DOSE IN 5 MINUTES IF NO RESPONSE.    oxyCODONE (Roxicodone) 10 mg immediate release tablet TAKE 1 TABLET BY MOUTH EVERY 6 HOURS     Past Medical History:    Hb SS SCD    Vitreous hemorrhage right eye, March 2019. s/p laser and close f/u via optho. Last opthal   Chronic fatigue      Past Surgical History:   She has a past surgical history that includes Other surgical history (07/14/2022) and MR angio head wo IV contrast (7/26/2019). Lives with Grandparents and uncle. 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 Mother passed away from complications of SCD and her uncle also has  SCD      Family History:   Family History   Problem Relation Name Age of Onset    Asthma Mother         Social History:   Yakov Jimenez  reports that he has never smoked. He has never used smokeless tobacco.  reports no history of drug use.    EXAMINATION FINDINGS   /60 (BP Location: Left arm, Patient Position: Sitting)   Pulse 79   Temp 36.7 °C (98.1 °F)   Resp 16   Wt 80.1 kg (176 lb 9.4 oz)   SpO2 99%   BMI 23.95 kg/m²   2.02 meters squared  Physical Exam  Vitals and nursing note reviewed.   Constitutional:       General: He is not in acute distress.     Appearance: Normal appearance. He is not ill-appearing.   HENT:      Nose: Nose normal.      Mouth/Throat:      Mouth: Mucous membranes are moist.      Pharynx: Oropharynx is clear. No oropharyngeal exudate or posterior oropharyngeal erythema.   Eyes:      General: No scleral icterus.        Right eye: No discharge.         Left eye: No discharge.       Extraocular Movements: Extraocular movements intact.      Pupils: Pupils are equal, round, and reactive to light.   Cardiovascular:      Rate and Rhythm: Normal rate and regular rhythm.      Pulses: Normal pulses.      Heart sounds: Normal heart sounds, S1 normal and S2 normal. No murmur heard.  Pulmonary:      Effort: Pulmonary effort is normal. No respiratory distress.      Breath sounds: Normal breath sounds. No wheezing or rales.   Abdominal:      General: Abdomen is flat. There is no distension.      Palpations: Abdomen is soft. There is no hepatomegaly or splenomegaly.      Tenderness: There is no abdominal tenderness. There is no guarding.   Musculoskeletal:         General: No swelling or deformity. Normal range of motion.      Cervical back: Normal range of motion and neck supple.      Right lower leg: No edema.      Left lower leg: No edema.   Skin:     General: Skin is warm.      Capillary Refill: Capillary refill takes less than 2 seconds.      Coloration: Skin is not jaundiced.      Findings: No bruising or lesion.   Neurological:      General: No focal deficit present.      Mental Status: He is alert and oriented to person, place, and time.      Cranial Nerves: No cranial nerve deficit.      Motor: No weakness.      Gait: Gait normal.   Psychiatric:         Mood and Affect: Mood normal.         Behavior: Behavior normal.       LABS   Lab on 06/19/2024   Component Date Value Ref Range Status    BNP 06/19/2024 7  0 - 99 pg/mL Final    HIV 1/2 Antigen/Antibody Screen wi* 06/19/2024 Nonreactive  Nonreactive Final    Syphilis Total Ab 06/19/2024 Nonreactive  Nonreactive Final    No significant level of Treponema pallidum antibody detected.   Repeat testing in 2 to 4 weeks may be considered if early   infection or incubating syphilis infection is suspected.    WBC 06/19/2024 4.1 (L)  4.4 - 11.3 x10*3/uL Final    nRBC 06/19/2024 33.2 (H)  0.0 - 0.0 /100 WBCs Final    RBC 06/19/2024 1.65 (L)  4.50 - 5.90  x10*6/uL Final    Hemoglobin 06/19/2024 7.7 (L)  13.5 - 17.5 g/dL Final    Hematocrit 06/19/2024 21.2 (L)  41.0 - 52.0 % Final    MCV 06/19/2024 129 (H)  80 - 100 fL Final    MCH 06/19/2024 46.7 (H)  26.0 - 34.0 pg Final    MCHC 06/19/2024 36.3 (H)  32.0 - 36.0 g/dL Final    RDW 06/19/2024 20.2 (H)  11.5 - 14.5 % Final    Platelets 06/19/2024 210  150 - 450 x10*3/uL Final    Neutrophils % 06/19/2024 45.5  40.0 - 80.0 % Final    Immature Granulocytes %, Automated 06/19/2024 0.2  0.0 - 0.9 % Final    Immature Granulocyte Count (IG) includes promyelocytes, myelocytes and metamyelocytes but does not include bands. Percent differential counts (%) should be interpreted in the context of the absolute cell counts (cells/UL).    Lymphocytes % 06/19/2024 46.7  13.0 - 44.0 % Final    Monocytes % 06/19/2024 5.7  2.0 - 10.0 % Final    Eosinophils % 06/19/2024 1.2  0.0 - 6.0 % Final    Basophils % 06/19/2024 0.7  0.0 - 2.0 % Final    Neutrophils Absolute 06/19/2024 1.85  1.20 - 7.70 x10*3/uL Final    Percent differential counts (%) should be interpreted in the context of the absolute cell counts (cells/uL).    Immature Granulocytes Absolute, Au* 06/19/2024 0.01  0.00 - 0.70 x10*3/uL Final    Lymphocytes Absolute 06/19/2024 1.90  1.20 - 4.80 x10*3/uL Final    Monocytes Absolute 06/19/2024 0.23  0.10 - 1.00 x10*3/uL Final    Eosinophils Absolute 06/19/2024 0.05  0.00 - 0.70 x10*3/uL Final    Basophils Absolute 06/19/2024 0.03  0.00 - 0.10 x10*3/uL Final    Automated WBC differential has been confirmed by manual smear.    Glucose 06/19/2024 86  74 - 99 mg/dL Final    Sodium 06/19/2024 138  136 - 145 mmol/L Final    Potassium 06/19/2024 3.8  3.5 - 5.3 mmol/L Final    Chloride 06/19/2024 103  98 - 107 mmol/L Final    Bicarbonate 06/19/2024 28  21 - 32 mmol/L Final    Anion Gap 06/19/2024 11  10 - 20 mmol/L Final    Urea Nitrogen 06/19/2024 12  6 - 23 mg/dL Final    Creatinine 06/19/2024 0.74  0.50 - 1.30 mg/dL Final    eGFR 06/19/2024  ">90  >60 mL/min/1.73m*2 Final    Calculations of estimated GFR are performed using the 2021 CKD-EPI Study Refit equation without the race variable for the IDMS-Traceable creatinine methods.  https://jasn.asnjournals.org/content/early/2021/09/22/ASN.2632348751    Calcium 06/19/2024 9.1  8.6 - 10.3 mg/dL Final    Albumin 06/19/2024 4.3  3.4 - 5.0 g/dL Final    Alkaline Phosphatase 06/19/2024 86  33 - 120 U/L Final    Total Protein 06/19/2024 7.9  6.4 - 8.2 g/dL Final    AST 06/19/2024 22  9 - 39 U/L Final    Bilirubin, Total 06/19/2024 1.1  0.0 - 1.2 mg/dL Final    ALT 06/19/2024 34  10 - 52 U/L Final    Patients treated with Sulfasalazine may generate falsely decreased results for ALT.    Retic % 06/19/2024 2.5 (H)  0.5 - 2.0 % Final    Retic Absolute 06/19/2024 0.041  0.022 - 0.118 x10*6/uL Final    Reticulocyte Hemoglobin 06/19/2024 44 (H)  28 - 38 pg Final    Immature Retic fraction 06/19/2024 27.4 (H)  <=16.0 % Final    Reticulocytes are measured based on a fluorescent technique. The IRF, or immature reticulocyte fraction, is the percent of reticulocytes that show medium (MFR) or high (HFR) fluorescence.  This value can be used to assess the relative maturity of the reticulocyte population in response to anemia. The \"shift reticulocytes\" are not measured by this technique, eliminating the need for their correction in the reticulocyte index.    LDH 06/19/2024 222  84 - 246 U/L Final    Hemoglobin F 06/19/2024 18.0 (H)  0.0 - 2.0 % Final    Hemoglobin S 06/19/2024 78.8 (H)  <=0.0 % Final    Hemoglobin A2 06/19/2024 3.2  2.0 - 3.5 % Final    Hemoglobin Identification Interpre* 06/19/2024 See Below (A)  Normal Final    Known Sickle Cell Anemia (Hb SS). Increased HbF is likely therapy effect. Clinical correlation is suggested.    Pathologist Review-Hemoglobin Iden* 06/19/2024 Electronically signed out by Josh Carter MD on 6/19/24 at 5:32 PM.  By the signature on this report, the individual or group listed as " making the Final Interpretation/Diagnosis certifies that they have reviewed this case.   Final    Neisseria gonorrhea,Amplified 06/19/2024 Negative  Negative Final    Chlamydia trachomatis, Amplified 06/19/2024 Negative  Negative Final    RBC Morphology 06/19/2024 See Below   Final    Polychromasia 06/19/2024 Mild   Final    RBC Fragments 06/19/2024 Few   Final    Sickle Cells 06/19/2024 Few   Final    Target Cells 06/19/2024 Few   Final    Teardrop Cells 06/19/2024 Few   Final    Bill-Rapids Bodies 06/19/2024 Present   Final    Pappenheimer Bodies 06/19/2024 Present   Final                 Zaida Rocha, APRN-CNP

## 2024-06-20 LAB
C TRACH RRNA SPEC QL NAA+PROBE: NEGATIVE
N GONORRHOEA DNA SPEC QL PROBE+SIG AMP: NEGATIVE

## 2024-06-22 LAB — T VAGINALIS RRNA SPEC QL NAA+PROBE: NEGATIVE

## 2024-07-08 DIAGNOSIS — D57.00 SICKLE CELL DISEASE WITH CRISIS (MULTI): ICD-10-CM

## 2024-07-08 DIAGNOSIS — E55.9 VITAMIN D DEFICIENCY: ICD-10-CM

## 2024-07-08 DIAGNOSIS — J45.909 ASTHMA, UNSPECIFIED ASTHMA SEVERITY, UNSPECIFIED WHETHER COMPLICATED, UNSPECIFIED WHETHER PERSISTENT (HHS-HCC): ICD-10-CM

## 2024-07-08 PROCEDURE — RXMED WILLOW AMBULATORY MEDICATION CHARGE

## 2024-07-08 RX ORDER — FLUTICASONE PROPIONATE AND SALMETEROL 50; 250 UG/1; UG/1
1 POWDER RESPIRATORY (INHALATION) EVERY 12 HOURS
Qty: 60 EACH | Refills: 2 | OUTPATIENT
Start: 2024-07-08 | End: 2024-10-06

## 2024-07-08 RX ORDER — OXYCODONE HYDROCHLORIDE 10 MG/1
10 TABLET ORAL EVERY 6 HOURS PRN
Qty: 15 TABLET | Refills: 0 | OUTPATIENT
Start: 2024-07-08

## 2024-07-08 RX ORDER — ERGOCALCIFEROL 1.25 MG/1
50000 CAPSULE ORAL
Qty: 12 CAPSULE | Refills: 0 | OUTPATIENT
Start: 2024-07-14 | End: 2024-10-06

## 2024-07-08 NOTE — TELEPHONE ENCOUNTER
Pt also left a voicemail on the refill line with a request for oxycodone 10mg q6 prn, #15.  Preferred pharmacy is [facility].

## 2024-07-11 ENCOUNTER — PHARMACY VISIT (OUTPATIENT)
Dept: PHARMACY | Facility: CLINIC | Age: 24
End: 2024-07-11
Payer: MEDICAID

## 2024-07-12 ENCOUNTER — TELEPHONE (OUTPATIENT)
Dept: HEMATOLOGY/ONCOLOGY | Facility: HOSPITAL | Age: 24
End: 2024-07-12
Payer: MEDICAID

## 2024-07-12 DIAGNOSIS — D57.00 SICKLE CELL DISEASE WITH CRISIS (MULTI): ICD-10-CM

## 2024-07-12 PROCEDURE — RXMED WILLOW AMBULATORY MEDICATION CHARGE

## 2024-07-12 RX ORDER — OXYCODONE HYDROCHLORIDE 10 MG/1
10 TABLET ORAL EVERY 6 HOURS PRN
Qty: 15 TABLET | Refills: 0 | Status: SHIPPED | OUTPATIENT
Start: 2024-07-12

## 2024-07-12 NOTE — TELEPHONE ENCOUNTER
Pt is requesting a refill of oxycodone 10mg q6 prn, #15.  Last prescribed 6/10/24.  Preferred pharmacy is [facility].

## 2024-07-13 ENCOUNTER — PHARMACY VISIT (OUTPATIENT)
Dept: PHARMACY | Facility: CLINIC | Age: 24
End: 2024-07-13
Payer: MEDICAID

## 2024-07-29 ENCOUNTER — APPOINTMENT (OUTPATIENT)
Dept: OPHTHALMOLOGY | Facility: CLINIC | Age: 24
End: 2024-07-29
Payer: MEDICAID

## 2024-07-29 DIAGNOSIS — H52.223 REGULAR ASTIGMATISM OF BOTH EYES: Primary | ICD-10-CM

## 2024-07-29 DIAGNOSIS — D57.09 SICKLE CELL DISEASE WITH CRISIS, WITH PROLIFERATIVE SICKLE CELL RETINOPATHY OF BOTH EYES (MULTI): ICD-10-CM

## 2024-07-29 DIAGNOSIS — D57.00 SICKLE CELL DISEASE WITH CRISIS (MULTI): ICD-10-CM

## 2024-07-29 DIAGNOSIS — H36.823 SICKLE CELL DISEASE WITH CRISIS, WITH PROLIFERATIVE SICKLE CELL RETINOPATHY OF BOTH EYES (MULTI): ICD-10-CM

## 2024-07-29 DIAGNOSIS — H52.13 MYOPIA, BILATERAL: ICD-10-CM

## 2024-07-29 PROCEDURE — 92015 DETERMINE REFRACTIVE STATE: CPT | Performed by: OPTOMETRIST

## 2024-07-29 PROCEDURE — 92014 COMPRE OPH EXAM EST PT 1/>: CPT | Performed by: OPTOMETRIST

## 2024-07-29 ASSESSMENT — CUP TO DISC RATIO
OS_RATIO: .50
OD_RATIO: .50

## 2024-07-29 ASSESSMENT — VISUAL ACUITY
OS_PH_SC: 20/20
OD_PH_SC: 20/25
OD_SC: 20/50
OS_PH_SC+: -1
OS_SC: 20/25
METHOD: SNELLEN - LINEAR
OS_SC+: -2
OD_SC+: -1
OD_PH_SC+: +1

## 2024-07-29 ASSESSMENT — EXTERNAL EXAM - LEFT EYE: OS_EXAM: NORMAL

## 2024-07-29 ASSESSMENT — REFRACTION_MANIFEST
OS_SPHERE: -0.50
OD_SPHERE: -0.75
OD_AXIS: 140
OD_AXIS: 135
OS_SPHERE: -0.50
OS_CYLINDER: -0.75
OS_AXIS: 035
METHOD_AUTOREFRACTION: 1
OD_CYLINDER: -1.25
OS_CYLINDER: -0.75
OD_SPHERE: -0.50
OS_AXIS: 035
OD_CYLINDER: -1.25

## 2024-07-29 ASSESSMENT — ENCOUNTER SYMPTOMS
NEUROLOGICAL NEGATIVE: 0
CONSTITUTIONAL NEGATIVE: 0
ALLERGIC/IMMUNOLOGIC NEGATIVE: 0
ENDOCRINE NEGATIVE: 0
PSYCHIATRIC NEGATIVE: 0
RESPIRATORY NEGATIVE: 0
MUSCULOSKELETAL NEGATIVE: 0
GASTROINTESTINAL NEGATIVE: 0
CARDIOVASCULAR NEGATIVE: 0
EYES NEGATIVE: 1
HEMATOLOGIC/LYMPHATIC NEGATIVE: 0

## 2024-07-29 ASSESSMENT — TONOMETRY
IOP_METHOD: GOLDMANN APPLANATION
OD_IOP_MMHG: 12
OS_IOP_MMHG: 12

## 2024-07-29 ASSESSMENT — CONF VISUAL FIELD
OD_NORMAL: 1
OS_INFERIOR_TEMPORAL_RESTRICTION: 0
OS_SUPERIOR_NASAL_RESTRICTION: 0
OS_NORMAL: 1
OD_SUPERIOR_NASAL_RESTRICTION: 0
OS_INFERIOR_NASAL_RESTRICTION: 0
OS_SUPERIOR_TEMPORAL_RESTRICTION: 0
OD_INFERIOR_NASAL_RESTRICTION: 0
OD_INFERIOR_TEMPORAL_RESTRICTION: 0
METHOD: COUNTING FINGERS
OD_SUPERIOR_TEMPORAL_RESTRICTION: 0

## 2024-07-29 ASSESSMENT — EXTERNAL EXAM - RIGHT EYE: OD_EXAM: NORMAL

## 2024-07-29 NOTE — PROGRESS NOTES
Hx of retinopathy VH and post focal laser OD and PPV and some scars inferior and temporal retina OS and a focal tuft not active constantin seen.   No new findings. The patient was asked to return to our clinic or seek out eye care ASAP if new flashes of light or floaters are noted.      A spectacle prescription was dispensed to be used as needed.     The patient was asked to return to our clinic in one year or sooner if ocular or vision changes occur.

## 2024-08-07 DIAGNOSIS — D57.00 SICKLE CELL DISEASE WITH CRISIS (MULTI): ICD-10-CM

## 2024-08-07 PROCEDURE — RXMED WILLOW AMBULATORY MEDICATION CHARGE

## 2024-08-07 RX ORDER — AMOXICILLIN 250 MG/1
250 CAPSULE ORAL EVERY 12 HOURS
Qty: 60 CAPSULE | Refills: 11 | Status: SHIPPED | OUTPATIENT
Start: 2024-08-07 | End: 2025-08-07

## 2024-08-07 RX ORDER — HYDROXYUREA 500 MG/1
2000 CAPSULE ORAL DAILY
Qty: 120 CAPSULE | Refills: 3 | Status: SHIPPED | OUTPATIENT
Start: 2024-08-07 | End: 2024-12-05

## 2024-08-07 RX ORDER — FOLIC ACID 1 MG/1
1 TABLET ORAL DAILY
Qty: 90 TABLET | Refills: 2 | Status: SHIPPED | OUTPATIENT
Start: 2024-08-07

## 2024-08-07 NOTE — TELEPHONE ENCOUNTER
Pt also requesting refill  Oxycodone 10mg. 1 tablet every 6 hrs prn  Pharmacy: [facility].     Pend and send sent for folic acid, hydrea, and amoxicillin    Last FUV 6/19 with next FUV 9/18

## 2024-08-09 ENCOUNTER — TELEPHONE (OUTPATIENT)
Dept: ADMISSION | Facility: HOSPITAL | Age: 24
End: 2024-08-09
Payer: MEDICAID

## 2024-08-09 DIAGNOSIS — D57.00 SICKLE CELL DISEASE WITH CRISIS (MULTI): ICD-10-CM

## 2024-08-09 PROCEDURE — RXMED WILLOW AMBULATORY MEDICATION CHARGE

## 2024-08-09 RX ORDER — OXYCODONE HYDROCHLORIDE 10 MG/1
10 TABLET ORAL EVERY 6 HOURS PRN
Qty: 15 TABLET | Refills: 0 | Status: SHIPPED | OUTPATIENT
Start: 2024-08-09

## 2024-08-09 NOTE — TELEPHONE ENCOUNTER
Pt requesting refill   Oxycodone 10mg. 1 tablet every 6 hrs prn   Pharmacy: [facility]  Last FUV 6/19 with next FUV 9/18

## 2024-08-10 ENCOUNTER — PHARMACY VISIT (OUTPATIENT)
Dept: PHARMACY | Facility: CLINIC | Age: 24
End: 2024-08-10
Payer: MEDICAID

## 2024-09-05 ENCOUNTER — APPOINTMENT (OUTPATIENT)
Dept: PULMONOLOGY | Facility: HOSPITAL | Age: 24
End: 2024-09-05
Payer: MEDICAID

## 2024-09-07 PROCEDURE — RXMED WILLOW AMBULATORY MEDICATION CHARGE

## 2024-09-10 ENCOUNTER — PHARMACY VISIT (OUTPATIENT)
Dept: PHARMACY | Facility: CLINIC | Age: 24
End: 2024-09-10
Payer: MEDICAID

## 2024-09-13 ENCOUNTER — PHARMACY VISIT (OUTPATIENT)
Dept: PHARMACY | Facility: CLINIC | Age: 24
End: 2024-09-13
Payer: MEDICAID

## 2024-09-13 PROCEDURE — RXMED WILLOW AMBULATORY MEDICATION CHARGE

## 2024-09-18 ENCOUNTER — LAB (OUTPATIENT)
Dept: LAB | Facility: HOSPITAL | Age: 24
End: 2024-09-18
Payer: MEDICAID

## 2024-09-18 ENCOUNTER — OFFICE VISIT (OUTPATIENT)
Dept: PULMONOLOGY | Facility: HOSPITAL | Age: 24
End: 2024-09-18
Payer: MEDICAID

## 2024-09-18 ENCOUNTER — OFFICE VISIT (OUTPATIENT)
Dept: HEMATOLOGY/ONCOLOGY | Facility: HOSPITAL | Age: 24
End: 2024-09-18
Payer: MEDICAID

## 2024-09-18 ENCOUNTER — PHARMACY VISIT (OUTPATIENT)
Dept: PHARMACY | Facility: CLINIC | Age: 24
End: 2024-09-18
Payer: MEDICAID

## 2024-09-18 VITALS
DIASTOLIC BLOOD PRESSURE: 64 MMHG | WEIGHT: 179.8 LBS | SYSTOLIC BLOOD PRESSURE: 117 MMHG | BODY MASS INDEX: 24.39 KG/M2 | HEART RATE: 72 BPM | OXYGEN SATURATION: 100 % | TEMPERATURE: 97.9 F

## 2024-09-18 VITALS
SYSTOLIC BLOOD PRESSURE: 118 MMHG | BODY MASS INDEX: 24.14 KG/M2 | TEMPERATURE: 97.9 F | WEIGHT: 178 LBS | DIASTOLIC BLOOD PRESSURE: 71 MMHG | OXYGEN SATURATION: 99 % | HEART RATE: 70 BPM

## 2024-09-18 DIAGNOSIS — D57.00 SICKLE CELL DISEASE WITH CRISIS (MULTI): ICD-10-CM

## 2024-09-18 DIAGNOSIS — D57.1 SICKLE CELL DISEASE WITHOUT CRISIS (MULTI): ICD-10-CM

## 2024-09-18 DIAGNOSIS — R06.02 SOB (SHORTNESS OF BREATH): Primary | ICD-10-CM

## 2024-09-18 LAB
ABO GROUP (TYPE) IN BLOOD: NORMAL
ALBUMIN SERPL BCP-MCNC: 4.5 G/DL (ref 3.4–5)
ALP SERPL-CCNC: 74 U/L (ref 33–120)
ALT SERPL W P-5'-P-CCNC: 34 U/L (ref 10–52)
ANION GAP SERPL CALC-SCNC: 12 MMOL/L (ref 10–20)
ANTIBODY SCREEN: NORMAL
AST SERPL W P-5'-P-CCNC: 28 U/L (ref 9–39)
BASOPHILS # BLD MANUAL: 0.04 X10*3/UL (ref 0–0.1)
BASOPHILS NFR BLD MANUAL: 1 %
BILIRUB SERPL-MCNC: 1.5 MG/DL (ref 0–1.2)
BUN SERPL-MCNC: 10 MG/DL (ref 6–23)
CALCIUM SERPL-MCNC: 9.6 MG/DL (ref 8.6–10.3)
CHLORIDE SERPL-SCNC: 103 MMOL/L (ref 98–107)
CO2 SERPL-SCNC: 28 MMOL/L (ref 21–32)
CREAT SERPL-MCNC: 0.79 MG/DL (ref 0.5–1.3)
DACRYOCYTES BLD QL SMEAR: ABNORMAL
EGFRCR SERPLBLD CKD-EPI 2021: >90 ML/MIN/1.73M*2
EOSINOPHIL # BLD MANUAL: 0.04 X10*3/UL (ref 0–0.7)
EOSINOPHIL NFR BLD MANUAL: 1 %
ERYTHROCYTE [DISTWIDTH] IN BLOOD BY AUTOMATED COUNT: 19.7 % (ref 11.5–14.5)
FERRITIN SERPL-MCNC: 199 NG/ML (ref 20–300)
GLUCOSE SERPL-MCNC: 91 MG/DL (ref 74–99)
HCT VFR BLD AUTO: 22.6 % (ref 41–52)
HGB BLD-MCNC: 8.3 G/DL (ref 13.5–17.5)
HGB RETIC QN: 44 PG (ref 28–38)
HOWELL-JOLLY BOD BLD QL SMEAR: PRESENT
IMM GRANULOCYTES # BLD AUTO: 0.01 X10*3/UL (ref 0–0.7)
IMM GRANULOCYTES NFR BLD AUTO: 0.2 % (ref 0–0.9)
IMMATURE RETIC FRACTION: 30.6 %
LDH SERPL L TO P-CCNC: 274 U/L (ref 84–246)
LYMPHOCYTES # BLD MANUAL: 2.8 X10*3/UL (ref 1.2–4.8)
LYMPHOCYTES NFR BLD MANUAL: 65 %
MCH RBC QN AUTO: 47.2 PG (ref 26–34)
MCHC RBC AUTO-ENTMCNC: 36.7 G/DL (ref 32–36)
MCV RBC AUTO: 128 FL (ref 80–100)
MONOCYTES # BLD MANUAL: 0.26 X10*3/UL (ref 0.1–1)
MONOCYTES NFR BLD MANUAL: 6 %
NEUTS SEG # BLD MANUAL: 1.16 X10*3/UL (ref 1.2–7)
NEUTS SEG NFR BLD MANUAL: 27 %
NRBC BLD-RTO: 69.6 /100 WBCS (ref 0–0)
PAPPENHEIMER BOD BLD QL SMEAR: PRESENT
PLATELET # BLD AUTO: 513 X10*3/UL (ref 150–450)
POLYCHROMASIA BLD QL SMEAR: ABNORMAL
POTASSIUM SERPL-SCNC: 4 MMOL/L (ref 3.5–5.3)
PROT SERPL-MCNC: 8.1 G/DL (ref 6.4–8.2)
RBC # BLD AUTO: 1.76 X10*6/UL (ref 4.5–5.9)
RBC MORPH BLD: ABNORMAL
RETICS #: 0.09 X10*6/UL (ref 0.02–0.12)
RETICS/RBC NFR AUTO: 5.1 % (ref 0.5–2)
RH FACTOR (ANTIGEN D): NORMAL
SCHISTOCYTES BLD QL SMEAR: ABNORMAL
SICKLE CELLS BLD QL SMEAR: ABNORMAL
SODIUM SERPL-SCNC: 139 MMOL/L (ref 136–145)
TARGETS BLD QL SMEAR: ABNORMAL
TOTAL CELLS COUNTED BLD: 100
WBC # BLD AUTO: 4.3 X10*3/UL (ref 4.4–11.3)

## 2024-09-18 PROCEDURE — 85007 BL SMEAR W/DIFF WBC COUNT: CPT

## 2024-09-18 PROCEDURE — 83021 HEMOGLOBIN CHROMOTOGRAPHY: CPT

## 2024-09-18 PROCEDURE — 99204 OFFICE O/P NEW MOD 45 MIN: CPT | Performed by: INTERNAL MEDICINE

## 2024-09-18 PROCEDURE — RXMED WILLOW AMBULATORY MEDICATION CHARGE

## 2024-09-18 PROCEDURE — 1036F TOBACCO NON-USER: CPT | Performed by: NURSE PRACTITIONER

## 2024-09-18 PROCEDURE — 82728 ASSAY OF FERRITIN: CPT

## 2024-09-18 PROCEDURE — 99214 OFFICE O/P EST MOD 30 MIN: CPT | Mod: GC | Performed by: INTERNAL MEDICINE

## 2024-09-18 PROCEDURE — 36415 COLL VENOUS BLD VENIPUNCTURE: CPT

## 2024-09-18 PROCEDURE — 99214 OFFICE O/P EST MOD 30 MIN: CPT | Performed by: NURSE PRACTITIONER

## 2024-09-18 PROCEDURE — 1036F TOBACCO NON-USER: CPT | Performed by: INTERNAL MEDICINE

## 2024-09-18 PROCEDURE — 80053 COMPREHEN METABOLIC PANEL: CPT

## 2024-09-18 PROCEDURE — 85027 COMPLETE CBC AUTOMATED: CPT

## 2024-09-18 PROCEDURE — 85045 AUTOMATED RETICULOCYTE COUNT: CPT

## 2024-09-18 PROCEDURE — 83615 LACTATE (LD) (LDH) ENZYME: CPT

## 2024-09-18 PROCEDURE — 86901 BLOOD TYPING SEROLOGIC RH(D): CPT

## 2024-09-18 RX ORDER — OXYCODONE HYDROCHLORIDE 10 MG/1
10 TABLET ORAL EVERY 6 HOURS PRN
Qty: 15 TABLET | Refills: 0 | Status: SHIPPED | OUTPATIENT
Start: 2024-09-18

## 2024-09-18 SDOH — ECONOMIC STABILITY: FOOD INSECURITY: WITHIN THE PAST 12 MONTHS, THE FOOD YOU BOUGHT JUST DIDN'T LAST AND YOU DIDN'T HAVE MONEY TO GET MORE.: NEVER TRUE

## 2024-09-18 SDOH — ECONOMIC STABILITY: FOOD INSECURITY: WITHIN THE PAST 12 MONTHS, YOU WORRIED THAT YOUR FOOD WOULD RUN OUT BEFORE YOU GOT MONEY TO BUY MORE.: NEVER TRUE

## 2024-09-18 ASSESSMENT — LIFESTYLE VARIABLES
HOW MANY STANDARD DRINKS CONTAINING ALCOHOL DO YOU HAVE ON A TYPICAL DAY: PATIENT DOES NOT DRINK
HOW OFTEN DO YOU HAVE SIX OR MORE DRINKS ON ONE OCCASION: NEVER
SKIP TO QUESTIONS 9-10: 1
AUDIT-C TOTAL SCORE: 0
HOW OFTEN DO YOU HAVE A DRINK CONTAINING ALCOHOL: NEVER

## 2024-09-18 ASSESSMENT — PAIN SCALES - GENERAL
PAINLEVEL: 0-NO PAIN
PAINLEVEL: 0-NO PAIN

## 2024-09-18 NOTE — PROGRESS NOTES
Department of Medicine I Division of Pulmonary, Critical Care, and Sleep Medicine   [address] [place] [address] [address] [place] [address]  Phone: [phone]  Fax: [phone]    History of Present Illness     Yakov Jimenez is a 24 y.o. male presenting with sickle cell anemia ( on hydroxyurea) presented to pulmonary clinic with fatigue, shortness of breath and feeling tired. He reports that he has good days and bad days. He does not report any recent changes in his breathing. He says he has struggled all his life with the fatigue and breathing. He was started on advair in March 2024 for concern for possible asthma but does not report any significant change with it.     He reports he was admitted for sickle cell crisis in dec 2023 and was treated with pneumonia. Denies cough, sputum production, fever, chest tightness, wheezing. Denies allergies/nasal congestion, acid reflux.   He had spirometry done 9/2023 with no obstruction although BD response was not assessed. CT chest  2022 was unremarkable.        Review of Systems  Review of Systems      Past Medical History     Past Medical History:   Diagnosis Date    History of panretinal photocoagulation 2022    OD    Sickle cell anemia (Multi)          Immunizations     Immunization History   Administered Date(s) Administered    Flu vaccine (IIV4), preservative free *Check age/dose* 09/24/2018, 09/16/2020, 11/22/2021    Hepatitis B vaccine, 19 yrs and under (RECOMBIVAX, ENGERIX) 09/27/2005    Pfizer Purple Cap SARS-CoV-2 03/21/2021, 04/11/2021, 01/11/2022    Pneumococcal conjugate vaccine, 13-valent (PREVNAR 13) 10/29/2020, 11/22/2021    Varicella vaccine, subcutaneous (VARIVAX) 08/19/2008       Medications and Allergies     Current Outpatient Medications   Medication Instructions    acetaminophen (Tylenol) 500 mg tablet 1-2 tablets, oral, Daily PRN    Advair Diskus 250-50 mcg/dose diskus inhaler 1 puff, inhalation, Every 12 hours    albuterol 90  "mcg/actuation inhaler Inhale 1-2 puffs  EVERY 4 TO 6 HOURS if needed for wheezing..    amoxicillin (Amoxil) 250 mg capsule TAKE 1 CAPSULE BY MOUTH EVERY 12 HOURS    calcium carbonate (Tums) 200 mg calcium chewable tablet 1 tablet, oral, As needed    folic acid (FOLVITE) 1 mg, oral, Daily    hydroxyurea (HYDREA) 2,000 mg, oral, Daily, Take at the same time each day.    naloxone (Narcan) 4 mg/0.1 mL nasal spray INSTILL 1 SPRAY IN ONE NOSTRIL AS NEEDED FOR ACCIDENTAL OPIOID OVERDOSE; REPEAT WITH SECOND DOSE IN 5 MINUTES IF NO RESPONSE.    naproxen (Naprosyn) 500 mg tablet oral,  May take one Naproxen with one tylenol for pain if tylenol alone doesn't work for mild to moderate sickle cell pain<BR>    oxyCODONE (ROXICODONE) 10 mg, oral, Every 6 hours PRN        Allergies:  Ceftriaxone    Social History     He reports that he has never smoked. He has never used smokeless tobacco. He reports that he does not drink alcohol and does not use drugs.    Denies vaping or any other drugs   Exposure/Job History: Looking for a job    Family History     Grand mother has asthma     Family History   Problem Relation Name Age of Onset    Asthma Mother      Glaucoma Neg Hx      Macular degeneration Neg Hx           Surgical History   He has a past surgical history that includes Other surgical history (07/14/2022) and MR angio head wo IV contrast (7/26/2019).    Physical Exam         1/3/2024     1:40 PM 2/26/2024     1:00 PM 3/19/2024    10:20 AM 4/15/2024     2:31 PM 6/19/2024     8:51 AM 9/18/2024     2:14 PM 9/18/2024     2:54 PM   Vitals   Systolic 116 126 127 120 103 117 118   Diastolic 78 57 73 65 60 64 71   Heart Rate 119 101 94 92 79 72 70   Temp 36.5 °C (97.7 °F) 37.1 °C (98.8 °F) 36.7 °C (98.1 °F) 36.6 °C (97.9 °F) 36.7 °C (98.1 °F) 36.6 °C (97.9 °F) 36.6 °C (97.9 °F)   Resp 18 17 16 14 16     Height (in)  1.831 m (6' 0.09\")   1.829 m (6')      Weight (lb)  173 178.35 183 176.59 179.8 178   BMI  23.41 kg/m2 24.13 kg/m2 24.82 " kg/m2 23.95 kg/m2 24.39 kg/m2 24.14 kg/m2   BSA (m2)  2 m2 2.03 m2 2.05 m2 2.02 m2 2.04 m2 2.02 m2   Visit Report  Report Report Report Report Report    Report Report    Report       Significant value    Multiple values from one day are sorted in reverse-chronological order        Physical Exam    General: No acute distress  HEENT: non icteric, no LAD  Chest: normal breath sounds bilaterally  CVS: S1 S2 heard  Abdomen: Soft, non-tender  Neuro: Alert and oriented x3     Results     Pulmonary Function Tests: 9/2023     Spirometry with no obstruction.  BD response was not checked.       Chest Radiograph:  XR chest 2 views 12/31/2023        Impression  1.  Patchy bibasilar parenchymal airspace opacities concerning for  developing pneumonia/infarct/aspiration.    Chest CT Scan: 5/2022:  IMPRESSION:  1. No CT signs of acute intrathoracic pathology. Mild  atelectasis/scarring predominantly in the lower lobes and middle lobe.  2. Mild cardiomegaly.  3. Sequela of the known sickle cell disease, including  cholelithiasis, autosplenectomy and osseous changes as above.       ECHO: 2022:   CONCLUSIONS:   1. The left ventricular systolic function is normal with a 60-65% estimated ejection fraction.   2. RVSP within normal limits.      Labs:  Lab Results   Component Value Date    WBC 4.1 (L) 06/19/2024    HGB 7.7 (L) 06/19/2024    HCT 21.2 (L) 06/19/2024     (H) 06/19/2024     06/19/2024       Lab Results   Component Value Date    CREATININE 0.74 06/19/2024    BUN 12 06/19/2024     06/19/2024    K 3.8 06/19/2024     06/19/2024    CO2 28 06/19/2024        Lab Results   Component Value Date    ARMANDO NEGATIVE 05/08/2019    SEDRATE 4 05/08/2019          Eosinophils Absolute (x10*3/uL)   Date Value   06/19/2024 0.05     Eosinophils Absolute, Manual (x10*3/uL)   Date Value   01/01/2024 0.00     Eosinophils %, Manual (%)   Date Value   01/01/2024 0.0     Eosinophils % (%)   Date Value   06/19/2024 1.2             Assessment and Plan       24 year old-    #Anemia/Sickle cell crisis/ baseline Hemoglobin 7-8  #Fatigue/shortness of breath on exertion with no recent changes in his symptoms ( reports symptoms all his life)- could be due to anemia but will get full PFT to look for obstructive lung disease/asthma although history not consistent with asthma. Echo normal in 2022 ( not concerning for pulmonary HTN)    Per hematology note, patient has been on and off oxygen although he was satting 99% on room air during this clinic visit. Six minute walk test will be helpful to assess if he desaturates on exertion.     Plan:  - Full PFT and 6 minute walk test   - History not consistent for asthma, can stop advair if it is not helping his symptoms.     Follow-up:  3 months     Steff Garces MD     =====  I saw and evaluated the patient. I personally obtained the key and critical portions of the history and physical exam or was physically present for key and critical portions performed by the resident/fellow. I reviewed the resident/fellow's documentation and discussed the patient with the resident/fellow. I agree with the resident/fellow's medical decision making as documented in the note.    Marvin Whittaker MD

## 2024-09-18 NOTE — PROGRESS NOTES
Patient ID: Yakov Jimenez is a 24 y.o. male.  Referring Physician: NICOL Lundberg  No address on file  Primary Care Provider: NICOL Maloney  Visit Type: Follow Up      Subjective  24 year old black male presents for follow up visit for Sickle Cell SS type. He denies pain today. When he has pain it is in his lower back. He uses non-pharmacological pain relief and non-narcotic relief prior to using Oxycodone. He denies constipation and priapism. He uses a steroid inhaler for asthma and home Oxygen when needed. He is pleasant and cooperative. 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 He take Hydroxyurea for Disease Modification. His last ED visit was 1/3/2024 for pain crisis and pneumonia.      HPI    Review of Systems - Oncology     Objective   BSA: 2.04 meters squared  /64 (BP Location: Left arm, Patient Position: Sitting, BP Cuff Size: Adult)   Pulse 72   Temp 36.6 °C (97.9 °F) (Temporal)   Wt 81.6 kg (179 lb 12.8 oz)   SpO2 100%   BMI 24.39 kg/m²      has a past medical history of History of panretinal photocoagulation (2022) and Sickle cell anemia (Multi).   has a past surgical history that includes Other surgical history (07/14/2022) and MR angio head wo IV contrast (7/26/2019).  Family History   Problem Relation Name Age of Onset    Asthma Mother      Glaucoma Neg Hx      Macular degeneration Neg Hx       Oncology History    No history exists.       Yakov Jimenez  reports that he has never smoked. He has never used smokeless tobacco.  He  reports no history of alcohol use.  He  reports no history of drug use.    Physical Exam  Vitals reviewed.   Constitutional:       Appearance: Normal appearance.   HENT:      Head: Normocephalic and atraumatic.      Nose: Nose normal.      Mouth/Throat:      Mouth: Mucous membranes are moist.   Eyes:      General: Scleral icterus present.   Cardiovascular:      Rate and Rhythm: Normal  rate.   Pulmonary:      Breath sounds: Normal breath sounds.   Abdominal:      General: Abdomen is flat. Bowel sounds are normal.   Musculoskeletal:         General: Normal range of motion.      Cervical back: Normal range of motion and neck supple.   Skin:     General: Skin is warm and dry.      Capillary Refill: Capillary refill takes less than 2 seconds.   Neurological:      General: No focal deficit present.      Mental Status: He is alert.   Psychiatric:         Mood and Affect: Mood normal.         Behavior: Behavior normal.     WBC   Date/Time Value Ref Range Status   06/19/2024 10:19 AM 4.1 (L) 4.4 - 11.3 x10*3/uL Final   03/19/2024 11:54 AM 4.1 (L) 4.4 - 11.3 x10*3/uL Final   02/26/2024 02:38 PM 2.9 (L) 4.4 - 11.3 x10*3/uL Final     nRBC   Date Value Ref Range Status   06/19/2024 33.2 (H) 0.0 - 0.0 /100 WBCs Final   03/19/2024 25.2 (H) 0.0 - 0.0 /100 WBCs Final   02/26/2024 40.1 (H) 0.0 - 0.0 /100 WBCs Final     RBC   Date Value Ref Range Status   06/19/2024 1.65 (L) 4.50 - 5.90 x10*6/uL Final   03/19/2024 1.68 (L) 4.50 - 5.90 x10*6/uL Final   02/26/2024 1.88 (L) 4.50 - 5.90 x10*6/uL Final     Hemoglobin   Date Value Ref Range Status   06/19/2024 7.7 (L) 13.5 - 17.5 g/dL Final   03/19/2024 7.8 (L) 13.5 - 17.5 g/dL Final   02/26/2024 8.4 (L) 13.5 - 17.5 g/dL Final     Hematocrit   Date Value Ref Range Status   06/19/2024 21.2 (L) 41.0 - 52.0 % Final   03/19/2024 21.5 (L) 41.0 - 52.0 % Final   02/26/2024 22.4 (L) 41.0 - 52.0 % Final     MCV   Date/Time Value Ref Range Status   06/19/2024 10:19  (H) 80 - 100 fL Final   03/19/2024 11:54  (H) 80 - 100 fL Final   02/26/2024 02:38  (H) 80 - 100 fL Final     MCH   Date/Time Value Ref Range Status   06/19/2024 10:19 AM 46.7 (H) 26.0 - 34.0 pg Final   03/19/2024 11:54 AM 46.4 (H) 26.0 - 34.0 pg Final   02/26/2024 02:38 PM 44.7 (H) 26.0 - 34.0 pg Final     MCHC   Date/Time Value Ref Range Status   06/19/2024 10:19 AM 36.3 (H) 32.0 - 36.0 g/dL Final  "  03/19/2024 11:54 AM 36.3 (H) 32.0 - 36.0 g/dL Final   02/26/2024 02:38 PM 37.5 (H) 32.0 - 36.0 g/dL Final     RDW   Date/Time Value Ref Range Status   06/19/2024 10:19 AM 20.2 (H) 11.5 - 14.5 % Final   03/19/2024 11:54 AM 21.1 (H) 11.5 - 14.5 % Final   02/26/2024 02:38 PM 21.2 (H) 11.5 - 14.5 % Final     Platelets   Date/Time Value Ref Range Status   06/19/2024 10:19  150 - 450 x10*3/uL Final   03/19/2024 11:54  150 - 450 x10*3/uL Final   02/26/2024 02:38  150 - 450 x10*3/uL Final     No results found for: \"MPV\"  Neutrophils %   Date/Time Value Ref Range Status   06/19/2024 10:19 AM 45.5 40.0 - 80.0 % Final   03/19/2024 11:54 AM 46.0 40.0 - 80.0 % Final   02/26/2024 02:38 PM 35.7 40.0 - 80.0 % Final     Immature Granulocytes %, Automated   Date/Time Value Ref Range Status   06/19/2024 10:19 AM 0.2 0.0 - 0.9 % Final     Comment:     Immature Granulocyte Count (IG) includes promyelocytes, myelocytes and metamyelocytes but does not include bands. Percent differential counts (%) should be interpreted in the context of the absolute cell counts (cells/UL).   03/19/2024 11:54 AM 0.2 0.0 - 0.9 % Final     Comment:     Immature Granulocyte Count (IG) includes promyelocytes, myelocytes and metamyelocytes but does not include bands. Percent differential counts (%) should be interpreted in the context of the absolute cell counts (cells/UL).   02/26/2024 02:38 PM 0.3 0.0 - 0.9 % Final     Comment:     Immature Granulocyte Count (IG) includes promyelocytes, myelocytes and metamyelocytes but does not include bands. Percent differential counts (%) should be interpreted in the context of the absolute cell counts (cells/UL).     Lymphocytes %, Manual   Date/Time Value Ref Range Status   01/01/2024 08:54 AM 24.1 13.0 - 44.0 % Final     Lymphocytes %   Date/Time Value Ref Range Status   06/19/2024 10:19 AM 46.7 13.0 - 44.0 % Final   03/19/2024 11:54 AM 46.2 13.0 - 44.0 % Final   02/26/2024 02:38 PM 55.8 13.0 - 44.0 " % Final     Monocytes %, Manual   Date/Time Value Ref Range Status   01/01/2024 08:54 AM 6.0 2.0 - 10.0 % Final     Monocytes %   Date/Time Value Ref Range Status   06/19/2024 10:19 AM 5.7 2.0 - 10.0 % Final   03/19/2024 11:54 AM 6.1 2.0 - 10.0 % Final   02/26/2024 02:38 PM 6.1 2.0 - 10.0 % Final     Eosinophils %, Manual   Date/Time Value Ref Range Status   01/01/2024 08:54 AM 0.0 0.0 - 6.0 % Final     Eosinophils %   Date/Time Value Ref Range Status   06/19/2024 10:19 AM 1.2 0.0 - 6.0 % Final   03/19/2024 11:54 AM 1.0 0.0 - 6.0 % Final   02/26/2024 02:38 PM 0.7 0.0 - 6.0 % Final     Basophils %, Manual   Date/Time Value Ref Range Status   01/01/2024 08:54 AM 0.9 0.0 - 2.0 % Final     Basophils %   Date/Time Value Ref Range Status   06/19/2024 10:19 AM 0.7 0.0 - 2.0 % Final   03/19/2024 11:54 AM 0.5 0.0 - 2.0 % Final   02/26/2024 02:38 PM 1.4 0.0 - 2.0 % Final     Neutrophils Absolute   Date/Time Value Ref Range Status   06/19/2024 10:19 AM 1.85 1.20 - 7.70 x10*3/uL Final     Comment:     Percent differential counts (%) should be interpreted in the context of the absolute cell counts (cells/uL).   03/19/2024 11:54 AM 1.90 1.20 - 7.70 x10*3/uL Final     Comment:     Percent differential counts (%) should be interpreted in the context of the absolute cell counts (cells/uL).   02/26/2024 02:38 PM 1.05 (L) 1.20 - 7.70 x10*3/uL Final     Comment:     Percent differential counts (%) should be interpreted in the context of the absolute cell counts (cells/uL).     Immature Granulocytes Absolute, Automated   Date/Time Value Ref Range Status   06/19/2024 10:19 AM 0.01 0.00 - 0.70 x10*3/uL Final   03/19/2024 11:54 AM 0.01 0.00 - 0.70 x10*3/uL Final   02/26/2024 02:38 PM 0.01 0.00 - 0.70 x10*3/uL Final     Lymphocytes Absolute   Date/Time Value Ref Range Status   06/19/2024 10:19 AM 1.90 1.20 - 4.80 x10*3/uL Final   03/19/2024 11:54 AM 1.91 1.20 - 4.80 x10*3/uL Final   02/26/2024 02:38 PM 1.64 1.20 - 4.80 x10*3/uL Final  "    Monocytes Absolute   Date/Time Value Ref Range Status   06/19/2024 10:19 AM 0.23 0.10 - 1.00 x10*3/uL Final   03/19/2024 11:54 AM 0.25 0.10 - 1.00 x10*3/uL Final   02/26/2024 02:38 PM 0.18 0.10 - 1.00 x10*3/uL Final     Eosinophils Absolute   Date/Time Value Ref Range Status   06/19/2024 10:19 AM 0.05 0.00 - 0.70 x10*3/uL Final   03/19/2024 11:54 AM 0.04 0.00 - 0.70 x10*3/uL Final   02/26/2024 02:38 PM 0.02 0.00 - 0.70 x10*3/uL Final     Eosinophils Absolute, Manual   Date/Time Value Ref Range Status   01/01/2024 08:54 AM 0.00 0.00 - 0.70 x10*3/uL Final     Basophils Absolute   Date/Time Value Ref Range Status   06/19/2024 10:19 AM 0.03 0.00 - 0.10 x10*3/uL Final     Comment:     Automated WBC differential has been confirmed by manual smear.   03/19/2024 11:54 AM 0.02 0.00 - 0.10 x10*3/uL Final     Comment:     Automated WBC differential has been confirmed by manual smear.   02/26/2024 02:38 PM 0.04 0.00 - 0.10 x10*3/uL Final     Basophils Absolute, Manual   Date/Time Value Ref Range Status   01/01/2024 08:54 AM 0.10 0.00 - 0.10 x10*3/uL Final       No components found for: \"PT\"  aPTT   Date/Time Value Ref Range Status   01/01/2024 08:54 AM 31 27 - 38 seconds Final       Assessment/Plan  3 month follow up  Continue with Hydroyurea  Continue us of home Oxygen as needed  Follow up with Ophthalmology  Continue us of Oxycodone 10 mg as needed for pain.   Follow up with Pulmonary appointment today.  Do ordered lab work.               "

## 2024-09-18 NOTE — PATIENT INSTRUCTIONS
Thank you for coming into the clinic today. It was a pleasure to see you!     Today we discussed the following:     - Full PFT and 6 minute walk test.   - Follow-up in 3 months   - Can stop inhaler if not helping.     -If you have any further questions feel free to call my office at [phone] (choose #2 to reach a pulmonary nurse) and please allow 1-2 business days for a response.     If you have any scheduling needs feel free to call [phone] (for breathing or walking test), [phone] (for EKG's, echocardiograms or cardiopulmonary stress test), and [phone] (for radiology tests such as CT scan, MRIs and nuclear medicine tests).    Steff Garces  Pulmonary and Critical Care Fellow     [address] [place] [address]

## 2024-09-19 LAB
HEMOGLOBIN A2: 4 % (ref 2–3.5)
HEMOGLOBIN F: 19.4 % (ref 0–2)
HEMOGLOBIN IDENTIFICATION INTERPRETATION: ABNORMAL
HEMOGLOBIN S: 76.6 %
PATH REVIEW-HGB IDENTIFICATION: ABNORMAL

## 2024-10-01 ENCOUNTER — HOSPITAL ENCOUNTER (OUTPATIENT)
Dept: RESPIRATORY THERAPY | Facility: HOSPITAL | Age: 24
Discharge: HOME | End: 2024-10-01
Payer: MEDICAID

## 2024-10-01 DIAGNOSIS — R06.02 SOB (SHORTNESS OF BREATH): ICD-10-CM

## 2024-10-01 DIAGNOSIS — R06.02 SHORTNESS OF BREATH: ICD-10-CM

## 2024-10-01 PROCEDURE — 94618 PULMONARY STRESS TESTING: CPT | Performed by: STUDENT IN AN ORGANIZED HEALTH CARE EDUCATION/TRAINING PROGRAM

## 2024-10-01 PROCEDURE — 94726 PLETHYSMOGRAPHY LUNG VOLUMES: CPT

## 2024-10-01 PROCEDURE — 94729 DIFFUSING CAPACITY: CPT | Performed by: STUDENT IN AN ORGANIZED HEALTH CARE EDUCATION/TRAINING PROGRAM

## 2024-10-01 PROCEDURE — 94060 EVALUATION OF WHEEZING: CPT | Performed by: STUDENT IN AN ORGANIZED HEALTH CARE EDUCATION/TRAINING PROGRAM

## 2024-10-01 PROCEDURE — 94726 PLETHYSMOGRAPHY LUNG VOLUMES: CPT | Performed by: STUDENT IN AN ORGANIZED HEALTH CARE EDUCATION/TRAINING PROGRAM

## 2024-10-02 LAB
MGC ASCENT PFT - FEV1 - POST: 3.44
MGC ASCENT PFT - FEV1 - PRE: 3.12
MGC ASCENT PFT - FEV1 - PREDICTED: 4.65
MGC ASCENT PFT - FVC - POST: 4.49
MGC ASCENT PFT - FVC - PRE: 4.41
MGC ASCENT PFT - FVC - PREDICTED: 5.54

## 2024-10-08 PROCEDURE — RXMED WILLOW AMBULATORY MEDICATION CHARGE

## 2024-10-10 ENCOUNTER — PHARMACY VISIT (OUTPATIENT)
Dept: PHARMACY | Facility: CLINIC | Age: 24
End: 2024-10-10
Payer: MEDICAID

## 2024-10-10 ENCOUNTER — SOCIAL WORK (OUTPATIENT)
Dept: HEMATOLOGY/ONCOLOGY | Facility: HOSPITAL | Age: 24
End: 2024-10-10
Payer: MEDICAID

## 2024-10-10 NOTE — PROGRESS NOTES
Social Work Note    Referral Source: Patient  Meeting Location: Phone  Person(s) Present: Patient, Pt's father & SW  Identified Needs: Utility Assistance, Employment Services & Care Coordination  Impression and Plan: LISW received phone call from pt and pt's father requesting assistance with their  bill. They report their  bill recently tripled. They contacted the  company who states they should hire a  to investigate leaks. If none are found, the [employer] company would then come and investigate. Pt and Father request a referral to a . LISW encouraged them to research online or speak to their community for a referral. Additionally, LISW reviewed [employer] as resource to help pay for  bill. Pt and father agreeable.     Pt's father states pt is having a difficult time managing his pain at home and would like pt to speak to provider about options. LISW assisted pt with rescheduling follow-up for 10/21 at 1:30pm. Additionally, pt's father states pt is interested in assistance with employment. LISW sent referral to [facility]W who will meet with pt during follow-up appointment on 10/21. Pt agreeable to plan. Patient denies any further psychosocial or support service needs at this time.   Interventions Provided: Advocacy, Assessment, Care Coordination, Education, and Referral to Community Resource  Estimated Time Spent: 60 min    Shad Application    Identified Need:  Bill   Shad Name: [employer]  Application Submitted via: email [email or web address] on 10/10/24  Anticipated Shad Award Amount: $300        SW will continue to follow as needed.

## 2024-10-21 ENCOUNTER — PHARMACY VISIT (OUTPATIENT)
Dept: PHARMACY | Facility: CLINIC | Age: 24
End: 2024-10-21
Payer: MEDICAID

## 2024-10-21 ENCOUNTER — DOCUMENTATION (OUTPATIENT)
Dept: HEMATOLOGY/ONCOLOGY | Facility: HOSPITAL | Age: 24
End: 2024-10-21
Payer: MEDICAID

## 2024-10-21 ENCOUNTER — OFFICE VISIT (OUTPATIENT)
Dept: HEMATOLOGY/ONCOLOGY | Facility: HOSPITAL | Age: 24
End: 2024-10-21
Payer: MEDICAID

## 2024-10-21 VITALS
OXYGEN SATURATION: 95 % | TEMPERATURE: 98.6 F | SYSTOLIC BLOOD PRESSURE: 115 MMHG | HEART RATE: 93 BPM | RESPIRATION RATE: 16 BRPM | BODY MASS INDEX: 23.62 KG/M2 | WEIGHT: 174.16 LBS | DIASTOLIC BLOOD PRESSURE: 60 MMHG

## 2024-10-21 DIAGNOSIS — D57.00 SICKLE CELL DISEASE WITH CRISIS (MULTI): ICD-10-CM

## 2024-10-21 DIAGNOSIS — Z51.81 ENCOUNTER FOR MONITORING OF HYDROXYUREA THERAPY: Primary | ICD-10-CM

## 2024-10-21 DIAGNOSIS — Z79.64 ENCOUNTER FOR MONITORING OF HYDROXYUREA THERAPY: Primary | ICD-10-CM

## 2024-10-21 PROCEDURE — RXMED WILLOW AMBULATORY MEDICATION CHARGE

## 2024-10-21 PROCEDURE — 99213 OFFICE O/P EST LOW 20 MIN: CPT | Performed by: PEDIATRICS

## 2024-10-21 RX ORDER — OXYCODONE HYDROCHLORIDE 10 MG/1
10 TABLET ORAL EVERY 6 HOURS PRN
Qty: 25 TABLET | Refills: 0 | Status: SHIPPED | OUTPATIENT
Start: 2024-10-21

## 2024-10-21 RX ORDER — NALOXONE HYDROCHLORIDE 4 MG/.1ML
1 SPRAY NASAL AS NEEDED
Qty: 2 EACH | Refills: 1 | Status: SHIPPED | OUTPATIENT
Start: 2024-10-21 | End: 2025-10-21

## 2024-10-21 ASSESSMENT — PATIENT HEALTH QUESTIONNAIRE - PHQ9
2. FEELING DOWN, DEPRESSED OR HOPELESS: NOT AT ALL
1. LITTLE INTEREST OR PLEASURE IN DOING THINGS: NOT AT ALL
SUM OF ALL RESPONSES TO PHQ9 QUESTIONS 1 AND 2: 0

## 2024-10-21 ASSESSMENT — PAIN SCALES - GENERAL: PAINLEVEL_OUTOF10: 0-NO PAIN

## 2024-10-21 NOTE — PROGRESS NOTES
CHW met with patient as reffered by ERIN Bonner. Patient previously inquired about employment resources that are available within the community. CHW provided information on [employer] career readiness programs and an upcoming [employer] career readiness fair.  Pt. Given contact info for programs and encouraged to reach out.  No further services were requested at this time.  CHW contact information provided for any future concerns.

## 2024-11-02 ASSESSMENT — ENCOUNTER SYMPTOMS
BLOOD IN STOOL: 0
FATIGUE: 0
COUGH: 0
APPETITE CHANGE: 0
DYSURIA: 0
EYE PROBLEMS: 0
FLANK PAIN: 0
SORE THROAT: 0
HEMATOLOGIC/LYMPHATIC NEGATIVE: 1
NUMBNESS: 0
ABDOMINAL PAIN: 0
FEVER: 0
BACK PAIN: 0
MYALGIAS: 0
NERVOUS/ANXIOUS: 0
WOUND: 0
FREQUENCY: 0
NAUSEA: 0
WHEEZING: 0
LIGHT-HEADEDNESS: 0
DEPRESSION: 0
UNEXPECTED WEIGHT CHANGE: 0
HEMOPTYSIS: 0
EXTREMITY WEAKNESS: 0
CONSTIPATION: 0
CHEST TIGHTNESS: 0
VOMITING: 0
CHILLS: 0
SCLERAL ICTERUS: 0
DIARRHEA: 0
ARTHRALGIAS: 0
LEG SWELLING: 0
HEADACHES: 0
PALPITATIONS: 0

## 2024-11-05 PROCEDURE — RXMED WILLOW AMBULATORY MEDICATION CHARGE

## 2024-11-07 ENCOUNTER — PHARMACY VISIT (OUTPATIENT)
Dept: PHARMACY | Facility: CLINIC | Age: 24
End: 2024-11-07
Payer: MEDICAID

## 2024-11-13 ENCOUNTER — SOCIAL WORK (OUTPATIENT)
Dept: HEMATOLOGY/ONCOLOGY | Facility: HOSPITAL | Age: 24
End: 2024-11-13
Payer: MEDICAID

## 2024-11-13 SDOH — ECONOMIC STABILITY: FOOD INSECURITY: WITHIN THE PAST 12 MONTHS, THE FOOD YOU BOUGHT JUST DIDN'T LAST AND YOU DIDN'T HAVE MONEY TO GET MORE.: OFTEN TRUE

## 2024-11-13 SDOH — ECONOMIC STABILITY: FOOD INSECURITY: WITHIN THE PAST 12 MONTHS, YOU WORRIED THAT YOUR FOOD WOULD RUN OUT BEFORE YOU GOT MONEY TO BUY MORE.: OFTEN TRUE

## 2024-11-13 NOTE — PROGRESS NOTES
Social Work Note    Referral Source: Patient  Meeting Location: Phone  Person(s) Present: Patient, Pt's father  Identified Needs: Food Resources  Impression and Plan: Pt contacted LISW inquiring how to get a sick appointment. LISW provided pt nurses line and encouraged to call to discuss. Pt agreeable to plan. Additionally, pt inquires how to obtain fresh produce. LISW provided education on food resources. Pt requested referral to [employer]. Phone number provided and request for referral submitted to Sickle Cell team. Patient denies any further psychosocial or support service needs at this time. Patient encouraged to contact LISW if any needs arise.   Interventions Provided: Education and Referral to Community Resource  Estimated Time Spent: 20 min    SW will continue to follow as needed.

## 2024-11-14 DIAGNOSIS — D57.00 SICKLE CELL DISEASE WITH CRISIS (MULTI): ICD-10-CM

## 2024-11-19 ENCOUNTER — TELEPHONE (OUTPATIENT)
Dept: HEMATOLOGY/ONCOLOGY | Facility: HOSPITAL | Age: 24
End: 2024-11-19
Payer: MEDICAID

## 2024-11-19 DIAGNOSIS — D57.00 SICKLE CELL DISEASE WITH CRISIS (MULTI): ICD-10-CM

## 2024-11-19 DIAGNOSIS — J45.909 ASTHMA, UNSPECIFIED ASTHMA SEVERITY, UNSPECIFIED WHETHER COMPLICATED, UNSPECIFIED WHETHER PERSISTENT (HHS-HCC): ICD-10-CM

## 2024-11-19 RX ORDER — HYDROXYUREA 500 MG/1
2000 CAPSULE ORAL DAILY
Qty: 120 CAPSULE | Refills: 3 | Status: SHIPPED | OUTPATIENT
Start: 2024-11-19 | End: 2025-03-19

## 2024-11-19 RX ORDER — OXYCODONE HYDROCHLORIDE 10 MG/1
10 TABLET ORAL EVERY 6 HOURS PRN
Qty: 25 TABLET | Refills: 0 | Status: SHIPPED | OUTPATIENT
Start: 2024-11-19

## 2024-11-19 RX ORDER — FLUTICASONE PROPIONATE AND SALMETEROL 50; 250 UG/1; UG/1
1 POWDER RESPIRATORY (INHALATION) EVERY 12 HOURS
Qty: 60 EACH | Refills: 2 | Status: SHIPPED | OUTPATIENT
Start: 2024-11-19 | End: 2025-02-17

## 2024-11-19 NOTE — TELEPHONE ENCOUNTER
Refill request received for the following medications:    Hydroxyurea 500mg  Oxycodone 10mg  Advair inhaler     Preferred pharmacy is [facility].    Message sent to Sickle Cell team.

## 2024-12-02 PROCEDURE — RXMED WILLOW AMBULATORY MEDICATION CHARGE

## 2024-12-03 ENCOUNTER — PHARMACY VISIT (OUTPATIENT)
Dept: PHARMACY | Facility: CLINIC | Age: 24
End: 2024-12-03
Payer: MEDICAID

## 2024-12-03 PROCEDURE — RXMED WILLOW AMBULATORY MEDICATION CHARGE

## 2024-12-18 ENCOUNTER — APPOINTMENT (OUTPATIENT)
Dept: HEMATOLOGY/ONCOLOGY | Facility: HOSPITAL | Age: 24
End: 2024-12-18
Payer: MEDICAID

## 2024-12-23 PROCEDURE — RXMED WILLOW AMBULATORY MEDICATION CHARGE

## 2024-12-26 ENCOUNTER — PHARMACY VISIT (OUTPATIENT)
Dept: PHARMACY | Facility: CLINIC | Age: 24
End: 2024-12-26
Payer: MEDICAID

## 2024-12-27 ENCOUNTER — CLINICAL SUPPORT (OUTPATIENT)
Dept: NUTRITION | Facility: HOSPITAL | Age: 24
End: 2024-12-27
Payer: MEDICAID

## 2024-12-27 DIAGNOSIS — D57.00 SICKLE CELL DISEASE WITH CRISIS (MULTI): ICD-10-CM

## 2024-12-27 NOTE — PROGRESS NOTES
[employer]  Diet Recommendation 1: Healthy Eating  Other Diet Recommendations: Sickle cell disease  Food Intolerance Avoidance: NKFA. Chicken, eggs, beef, artificial sweeteners limited in HH 2/2 grandparent's medical conditions  Household Size: 3 Family Members  Interventions: Referral Number: 1st 6 Mo Referral 6 Mos  Interventions: Visit Number: 1 of 6 Visits - Max 6 Visits/Referral Each 6 Mo Period  Education Today: [employer] Meals  Follow Up Notes for Future Visits: Everyone cooks, but mainly gma. Mainly eat as a fam, favorite is mac n cheese  Grains: 0-25% Whole  Fruit: 0-25% Fresh  Vegetables: 50-75% Fresh  Proteins: 1-2 Plant-based Items  Dairy: 0-25% Lowfat  Originating Site of Referral Order: [facility] [place]  Initials of RD Assisting Today: ANGEL

## 2025-01-03 PROCEDURE — RXMED WILLOW AMBULATORY MEDICATION CHARGE

## 2025-01-07 ENCOUNTER — TELEPHONE (OUTPATIENT)
Dept: ADMISSION | Facility: HOSPITAL | Age: 25
End: 2025-01-07
Payer: MEDICAID

## 2025-01-07 DIAGNOSIS — D57.00 SICKLE CELL DISEASE WITH CRISIS (MULTI): ICD-10-CM

## 2025-01-07 RX ORDER — OXYCODONE HYDROCHLORIDE 10 MG/1
10 TABLET ORAL EVERY 6 HOURS PRN
Qty: 25 TABLET | Refills: 0 | Status: SHIPPED | OUTPATIENT
Start: 2025-01-07 | End: 2025-01-09 | Stop reason: SDUPTHER

## 2025-01-07 NOTE — TELEPHONE ENCOUNTER
Yakov Jimenez called the refill line for Oxycodone 10mg. Requesting refills be sent to [facility] pharmacy; message sent to Sickle Cell team to submit.

## 2025-01-08 ENCOUNTER — OFFICE VISIT (OUTPATIENT)
Dept: PULMONOLOGY | Facility: HOSPITAL | Age: 25
End: 2025-01-08
Payer: MEDICAID

## 2025-01-08 ENCOUNTER — PHARMACY VISIT (OUTPATIENT)
Dept: PHARMACY | Facility: CLINIC | Age: 25
End: 2025-01-08
Payer: MEDICAID

## 2025-01-08 VITALS
WEIGHT: 175 LBS | HEART RATE: 81 BPM | OXYGEN SATURATION: 97 % | SYSTOLIC BLOOD PRESSURE: 115 MMHG | BODY MASS INDEX: 23.73 KG/M2 | DIASTOLIC BLOOD PRESSURE: 70 MMHG | TEMPERATURE: 97.5 F

## 2025-01-08 DIAGNOSIS — R06.02 SOB (SHORTNESS OF BREATH): ICD-10-CM

## 2025-01-08 PROCEDURE — 99213 OFFICE O/P EST LOW 20 MIN: CPT | Mod: GC | Performed by: INTERNAL MEDICINE

## 2025-01-08 PROCEDURE — 99213 OFFICE O/P EST LOW 20 MIN: CPT | Performed by: INTERNAL MEDICINE

## 2025-01-08 ASSESSMENT — PAIN SCALES - GENERAL: PAINLEVEL_OUTOF10: 0-NO PAIN

## 2025-01-08 NOTE — PROGRESS NOTES
Department of Medicine I Division of Pulmonary, Critical Care, and Sleep Medicine   [id number] [place] [address] [address] [place] [address]  Phone: [phone]  Fax: [phone]    History of Present Illness     HPI 1/8/2025: 24 y.o. male presenting with sickle cell anemia ( on hydroxyurea) presented to pulmonary clinic with fatigue, shortness of breath and feeling tired. He reports that he has good days and bad days. He does not report any recent changes in his breathing. He says he has struggled all his life with the fatigue and breathing. He was started on advair in March 2024 for concern for possible asthma but does not report any significant change with it.      He reports he was admitted for sickle cell crisis in dec 2023 and was treated with pneumonia. Denies cough, sputum production, fever, chest tightness, wheezing. Denies allergies/nasal congestion, acid reflux.   He had spirometry done 9/2023 with no obstruction although BD response was not assessed. CT chest  2022 was unremarkable.    Present visit:      1/8/2025: PFT from 10/2024 shows FEV1 67% (3.12) with no BD, mild restriction, DLCO borderline. He reports that he uses advair twice a day and does not need albuterol. His asthma symptoms are well controlled. Denies any active or passive exposure to smoking/vaping/marijuana.        Review of Systems  Review of Systems      Past Medical History     Past Medical History:   Diagnosis Date    History of panretinal photocoagulation 2022    OD    Sickle cell anemia (Multi)          Immunizations     Immunization History   Administered Date(s) Administered    COVID-19, mRNA, LNP-S, PF, 30 mcg/0.3 mL dose 03/21/2021, 04/11/2021, 01/11/2022    Flu vaccine (IIV4), preservative free *Check age/dose* 09/24/2018, 09/16/2020, 11/22/2021    Hepatitis B vaccine, 19 yrs and under (RECOMBIVAX, ENGERIX) 09/27/2005    Pneumococcal conjugate vaccine, 13-valent (PREVNAR 13) 10/29/2020, 11/22/2021    Varicella vaccine,  subcutaneous (VARIVAX) 08/19/2008       Medications and Allergies     Current Outpatient Medications   Medication Instructions    acetaminophen (Tylenol) 500 mg tablet 1-2 tablets, Daily PRN    Advair Diskus 250-50 mcg/dose diskus inhaler 1 puff, inhalation, Every 12 hours    albuterol 90 mcg/actuation inhaler Inhale 1-2 puffs  EVERY 4 TO 6 HOURS if needed for wheezing..    amoxicillin (Amoxil) 250 mg capsule TAKE 1 CAPSULE BY MOUTH EVERY 12 HOURS    folic acid (FOLVITE) 1 mg, oral, Daily    hydroxyurea (HYDREA) 2,000 mg, oral, Daily, Take at the same time each day.    naloxone (Narcan) 4 mg/0.1 mL nasal spray INSTILL 1 SPRAY IN ONE NOSTRIL AS NEEDED FOR ACCIDENTAL OPIOID OVERDOSE; REPEAT WITH SECOND DOSE IN 5 MINUTES IF NO RESPONSE.    naproxen (Naprosyn) 500 mg tablet Take by mouth.  May take one Naproxen with one tylenol for pain if tylenol alone doesn't work for mild to moderate sickle cell pain    oxyCODONE (ROXICODONE) 10 mg, oral, Every 6 hours PRN        Allergies:  Ceftriaxone    Social History   He reports that he has never smoked. He has never used smokeless tobacco. He reports that he does not drink alcohol and does not use drugs.     Denies vaping or any other drugs   Exposure/Job History: Looking for a job    Family History     Family History   Problem Relation Name Age of Onset    Asthma Mother      Glaucoma Neg Hx      Macular degeneration Neg Hx           Surgical History   He has a past surgical history that includes Other surgical history (07/14/2022) and MR angio head wo IV contrast (7/26/2019).    Physical Exam     Vitals:    01/08/25 1509   BP: 115/70   Pulse: 81   Temp: 36.4 °C (97.5 °F)   SpO2: 97%         Physical Exam    General: No acute distress  HEENT: non icteric, no LAD  Chest: normal breath sounds bilaterally  CVS: S1 S2 heard  Abdomen: Soft, non-tender  Neuro: Alert and oriented x3     Results   Pulmonary Function Tests:    10/1/2024:  PFT: Spirometry indicates mild airflow  obstruction. There is no significant bronchodilator response. Lung volumes indicate a mild restrictive defect. The DLCO corrected for hemoglobin is mildly reduced. Low DLCO with low/normal KCO is consistent with a loss of alveoli capillary structure with loss of lung volume.     Ratio 0.71 low FEV1 67%, TLC borderline low 5.91 91%, corrected DLCO 75% borderline low    10/2024: 6 minute walk test: 457, 99% saturation after test, no oxygen saturation drop.     Chest Radiograph:  XR chest 2 views 12/31/2023    Narrative  Interpreted By:  Krunal Najera,  STUDY:  XR CHEST 2 VIEWS;  12/31/2023 9:33 am    INDICATION:  Signs/Symptoms:fever concern for PNA vs acute chest.    COMPARISON:  12/26/2023.    ACCESSION NUMBER(S):  [id number]    ORDERING CLINICIAN:  WHIT DICKINSON    FINDINGS:      CARDIOMEDIASTINAL SILHOUETTE:  Cardiomediastinal silhouette is normal in size and configuration.    LUNGS:  There are patchy bibasilar parenchymal airspace opacities concerning  for developing pneumonia/infarct. No pneumothorax.    ABDOMEN:  No remarkable upper abdominal findings.    BONES:  No acute osseous changes.    Impression  1.  Patchy bibasilar parenchymal airspace opacities concerning for  developing pneumonia/infarct/aspiration.      Signed by: Krunal Najera 12/31/2023 11:38 AM  Dictation workstation:   [id number]      Chest CT Scan:  No results found for this or any previous visit from the past 2000 days.       ECHO:  No results found for this or any previous visit from the past 365 days.       Labs:  Lab Results   Component Value Date    WBC 4.3 (L) 09/18/2024    HGB 8.3 (L) 09/18/2024    HCT 22.6 (L) 09/18/2024     (H) 09/18/2024     (H) 09/18/2024       Lab Results   Component Value Date    CREATININE 0.79 09/18/2024    BUN 10 09/18/2024     09/18/2024    K 4.0 09/18/2024     09/18/2024    CO2 28 09/18/2024        Lab Results   Component Value Date    ARMANDO NEGATIVE 05/08/2019    SEDRATE 4  05/08/2019        Eosinophils Absolute, Manual (x10*3/uL)   Date Value   09/18/2024 0.04     Eosinophils %, Manual (%)   Date Value   09/18/2024 1.0          Assessment and Plan     24 year old-     #Anemia/Sickle cell crisis/ baseline Hemoglobin 7-8  #Fatigue/shortness of breath on exertion with no recent changes in his symptoms ( reports symptoms all his life)- PFT consistent with mild obstruction FEV1 67%, mild restrictive disease. Asthma symptoms well controlled. Fatigue likely due to chronic anemia     No drop in oxygen saturation on walking.  Echo normal in 2022 ( not concerning for pulmonary HTN)      Plan:  - Continue advair with albuterol PRN  - CT chest non contrast to look for parenchymal lung disease      Follow-up:  6 months     Steff Garces MD

## 2025-01-08 NOTE — PATIENT INSTRUCTIONS
Thank you for coming into the clinic today. It was a pleasure to see you!     Diagnosis:    Today we discussed the following:   - CT chest non contrast to rule out any lung parenchymal disease given the restriction on PFT  - Follow up in 6 months    If you have any further questions feel free to call my office at [phone] (choose #2 to reach a pulmonary nurse) and please allow 1-2 business days for a response.     If you have any scheduling needs feel free to call [phone] (for breathing or walking test), [phone] (for EKG's, echocardiograms or cardiopulmonary stress test), and [phone] (for radiology tests such as CT scan, MRIs and nuclear medicine tests).    Steff Garces  Pulmonary and Critical Care Fellow     [address] [place] [address]

## 2025-01-09 ENCOUNTER — PHARMACY VISIT (OUTPATIENT)
Dept: PHARMACY | Facility: CLINIC | Age: 25
End: 2025-01-09
Payer: MEDICAID

## 2025-01-09 ENCOUNTER — LAB (OUTPATIENT)
Dept: LAB | Facility: HOSPITAL | Age: 25
End: 2025-01-09
Payer: MEDICAID

## 2025-01-09 ENCOUNTER — OFFICE VISIT (OUTPATIENT)
Dept: HEMATOLOGY/ONCOLOGY | Facility: HOSPITAL | Age: 25
End: 2025-01-09
Payer: MEDICAID

## 2025-01-09 VITALS
DIASTOLIC BLOOD PRESSURE: 68 MMHG | HEART RATE: 86 BPM | WEIGHT: 171.96 LBS | SYSTOLIC BLOOD PRESSURE: 116 MMHG | RESPIRATION RATE: 20 BRPM | BODY MASS INDEX: 23.32 KG/M2 | TEMPERATURE: 97.7 F | OXYGEN SATURATION: 97 %

## 2025-01-09 DIAGNOSIS — D57.00 SICKLE CELL DISEASE WITH CRISIS (MULTI): ICD-10-CM

## 2025-01-09 LAB
ABO GROUP (TYPE) IN BLOOD: NORMAL
ALBUMIN SERPL BCP-MCNC: 4.5 G/DL (ref 3.4–5)
ALP SERPL-CCNC: 67 U/L (ref 33–120)
ALT SERPL W P-5'-P-CCNC: 17 U/L (ref 10–52)
ANION GAP SERPL CALC-SCNC: 9 MMOL/L (ref 10–20)
ANTIBODY SCREEN: NORMAL
AST SERPL W P-5'-P-CCNC: 22 U/L (ref 9–39)
BASOPHILS # BLD AUTO: 0.03 X10*3/UL (ref 0–0.1)
BASOPHILS NFR BLD AUTO: 0.7 %
BILIRUB SERPL-MCNC: 1.8 MG/DL (ref 0–1.2)
BUN SERPL-MCNC: 8 MG/DL (ref 6–23)
CALCIUM SERPL-MCNC: 9.2 MG/DL (ref 8.6–10.3)
CHLORIDE SERPL-SCNC: 105 MMOL/L (ref 98–107)
CO2 SERPL-SCNC: 29 MMOL/L (ref 21–32)
CREAT SERPL-MCNC: 0.8 MG/DL (ref 0.5–1.3)
EGFRCR SERPLBLD CKD-EPI 2021: >90 ML/MIN/1.73M*2
EOSINOPHIL # BLD AUTO: 0.03 X10*3/UL (ref 0–0.7)
EOSINOPHIL NFR BLD AUTO: 0.7 %
ERYTHROCYTE [DISTWIDTH] IN BLOOD BY AUTOMATED COUNT: 18.8 % (ref 11.5–14.5)
FERRITIN SERPL-MCNC: 129 NG/ML (ref 20–300)
GLUCOSE SERPL-MCNC: 84 MG/DL (ref 74–99)
HCT VFR BLD AUTO: 23.9 % (ref 41–52)
HGB BLD-MCNC: 8.9 G/DL (ref 13.5–17.5)
HGB RETIC QN: 44 PG (ref 28–38)
IMM GRANULOCYTES # BLD AUTO: 0.01 X10*3/UL (ref 0–0.7)
IMM GRANULOCYTES NFR BLD AUTO: 0.2 % (ref 0–0.9)
IMMATURE RETIC FRACTION: 27.3 %
LDH SERPL L TO P-CCNC: 262 U/L (ref 84–246)
LYMPHOCYTES # BLD AUTO: 2.07 X10*3/UL (ref 1.2–4.8)
LYMPHOCYTES NFR BLD AUTO: 50.4 %
MCH RBC QN AUTO: 46.8 PG (ref 26–34)
MCHC RBC AUTO-ENTMCNC: 37.2 G/DL (ref 32–36)
MCV RBC AUTO: 126 FL (ref 80–100)
MONOCYTES # BLD AUTO: 0.21 X10*3/UL (ref 0.1–1)
MONOCYTES NFR BLD AUTO: 5.1 %
NEUTROPHILS # BLD AUTO: 1.76 X10*3/UL (ref 1.2–7.7)
NEUTROPHILS NFR BLD AUTO: 42.9 %
NRBC BLD-RTO: 50.1 /100 WBCS (ref 0–0)
PLATELET # BLD AUTO: 311 X10*3/UL (ref 150–450)
POTASSIUM SERPL-SCNC: 4 MMOL/L (ref 3.5–5.3)
PROT SERPL-MCNC: 7.8 G/DL (ref 6.4–8.2)
RBC # BLD AUTO: 1.9 X10*6/UL (ref 4.5–5.9)
RETICS #: 0.1 X10*6/UL (ref 0.02–0.12)
RETICS/RBC NFR AUTO: 5 % (ref 0.5–2)
RH FACTOR (ANTIGEN D): NORMAL
SODIUM SERPL-SCNC: 139 MMOL/L (ref 136–145)
WBC # BLD AUTO: 4.1 X10*3/UL (ref 4.4–11.3)

## 2025-01-09 PROCEDURE — 85025 COMPLETE CBC W/AUTO DIFF WBC: CPT

## 2025-01-09 PROCEDURE — 99214 OFFICE O/P EST MOD 30 MIN: CPT | Performed by: NURSE PRACTITIONER

## 2025-01-09 PROCEDURE — 1036F TOBACCO NON-USER: CPT | Performed by: NURSE PRACTITIONER

## 2025-01-09 PROCEDURE — 83615 LACTATE (LD) (LDH) ENZYME: CPT

## 2025-01-09 PROCEDURE — 80053 COMPREHEN METABOLIC PANEL: CPT

## 2025-01-09 PROCEDURE — 83021 HEMOGLOBIN CHROMOTOGRAPHY: CPT

## 2025-01-09 PROCEDURE — 82728 ASSAY OF FERRITIN: CPT

## 2025-01-09 PROCEDURE — RXMED WILLOW AMBULATORY MEDICATION CHARGE

## 2025-01-09 PROCEDURE — 85045 AUTOMATED RETICULOCYTE COUNT: CPT

## 2025-01-09 PROCEDURE — 86901 BLOOD TYPING SEROLOGIC RH(D): CPT

## 2025-01-09 PROCEDURE — 36415 COLL VENOUS BLD VENIPUNCTURE: CPT

## 2025-01-09 RX ORDER — OXYCODONE HYDROCHLORIDE 10 MG/1
10 TABLET ORAL EVERY 6 HOURS PRN
Qty: 25 TABLET | Refills: 0 | Status: SHIPPED | OUTPATIENT
Start: 2025-01-09

## 2025-01-09 ASSESSMENT — PAIN SCALES - GENERAL: PAINLEVEL_OUTOF10: 0-NO PAIN

## 2025-01-09 NOTE — PROGRESS NOTES
Patient ID: Yakov Jimenez is a 24 y.o. male.  Referring Physician: Katherine Pendleton, APRN-CNP  [address]  Department of Medicine-H[place]ology [place] [address]  Primary Care Provider: No Assigned PCP Generic Provider, MD  Visit Type: Follow Up      Subjective  24 year old black male presents for follow up for Sickle Cell SS type. He does take Hydroxyurea for disease modification. He does not to PRBC exchanges. He denies pain today, but it typically in his back. He uses Oxycodone 10 mg for pain. OARRS reviewed. Non pharmacological pain methods discussed. He C/O popping in his joints and pain in his right elbow. He denies smoking, but does have oxygen at home which he does not use. He denies constipation and priapism. He does not work or have children. He is looking for work. He is wearing glasses and has done well since his right eye surgery for hemophage was done. He has not been hospitalized since January 2024 for pneumonia and crisis. He was seen in ED in November 2023 for dizziness.         HPI    Review of Systems - Oncology     Objective   BSA: 1.99 meters squared  /68   Pulse 86   Temp 36.5 °C (97.7 °F) (Core)   Resp 20   Wt 78 kg (171 lb 15.3 oz)   SpO2 97%   BMI 23.32 kg/m²      has a past medical history of History of panretinal photocoagulation (2022) and Sickle cell anemia (Multi).   has a past surgical history that includes Other surgical history (07/14/2022) and MR angio head wo IV contrast (7/26/2019).  Family History   Problem Relation Name Age of Onset    Asthma Mother      Glaucoma Neg Hx      Macular degeneration Neg Hx       Oncology History    No history exists.       Yakov Jimenez  reports that he has never smoked. He has never used smokeless tobacco.  He  reports no history of alcohol use.  He  reports no history of drug use.    Physical Exam  Vitals reviewed.   Constitutional:       Appearance: Normal appearance.   HENT:      Head: Normocephalic and  atraumatic.      Nose: Nose normal.      Mouth/Throat:      Mouth: Mucous membranes are moist.   Eyes:      General: Scleral icterus present.      Pupils: Pupils are equal, round, and reactive to light.   Cardiovascular:      Rate and Rhythm: Normal rate and regular rhythm.      Pulses: Normal pulses.   Pulmonary:      Effort: Pulmonary effort is normal.      Breath sounds: Normal breath sounds.   Abdominal:      General: Abdomen is flat. Bowel sounds are normal.   Musculoskeletal:         General: Normal range of motion.      Cervical back: Normal range of motion.   Skin:     General: Skin is warm and dry.      Capillary Refill: Capillary refill takes 2 to 3 seconds.   Neurological:      General: No focal deficit present.      Mental Status: He is alert and oriented to person, place, and time. Mental status is at baseline.   Psychiatric:         Mood and Affect: Mood normal.         Behavior: Behavior normal.     WBC   Date/Time Value Ref Range Status   09/18/2024 04:14 PM 4.3 (L) 4.4 - 11.3 x10*3/uL Final   06/19/2024 10:19 AM 4.1 (L) 4.4 - 11.3 x10*3/uL Final   03/19/2024 11:54 AM 4.1 (L) 4.4 - 11.3 x10*3/uL Final     nRBC   Date Value Ref Range Status   09/18/2024 69.6 (H) 0.0 - 0.0 /100 WBCs Final   06/19/2024 33.2 (H) 0.0 - 0.0 /100 WBCs Final   03/19/2024 25.2 (H) 0.0 - 0.0 /100 WBCs Final     RBC   Date Value Ref Range Status   09/18/2024 1.76 (L) 4.50 - 5.90 x10*6/uL Final   06/19/2024 1.65 (L) 4.50 - 5.90 x10*6/uL Final   03/19/2024 1.68 (L) 4.50 - 5.90 x10*6/uL Final     Hemoglobin   Date Value Ref Range Status   09/18/2024 8.3 (L) 13.5 - 17.5 g/dL Final   06/19/2024 7.7 (L) 13.5 - 17.5 g/dL Final   03/19/2024 7.8 (L) 13.5 - 17.5 g/dL Final     Hematocrit   Date Value Ref Range Status   09/18/2024 22.6 (L) 41.0 - 52.0 % Final   06/19/2024 21.2 (L) 41.0 - 52.0 % Final   03/19/2024 21.5 (L) 41.0 - 52.0 % Final     MCV   Date/Time Value Ref Range Status   09/18/2024 04:14  (H) 80 - 100 fL Final  "  06/19/2024 10:19  (H) 80 - 100 fL Final   03/19/2024 11:54  (H) 80 - 100 fL Final     MCH   Date/Time Value Ref Range Status   09/18/2024 04:14 PM 47.2 (H) 26.0 - 34.0 pg Final   06/19/2024 10:19 AM 46.7 (H) 26.0 - 34.0 pg Final   03/19/2024 11:54 AM 46.4 (H) 26.0 - 34.0 pg Final     MCHC   Date/Time Value Ref Range Status   09/18/2024 04:14 PM 36.7 (H) 32.0 - 36.0 g/dL Final   06/19/2024 10:19 AM 36.3 (H) 32.0 - 36.0 g/dL Final   03/19/2024 11:54 AM 36.3 (H) 32.0 - 36.0 g/dL Final     RDW   Date/Time Value Ref Range Status   09/18/2024 04:14 PM 19.7 (H) 11.5 - 14.5 % Final   06/19/2024 10:19 AM 20.2 (H) 11.5 - 14.5 % Final   03/19/2024 11:54 AM 21.1 (H) 11.5 - 14.5 % Final     Platelets   Date/Time Value Ref Range Status   09/18/2024 04:14  (H) 150 - 450 x10*3/uL Final   06/19/2024 10:19  150 - 450 x10*3/uL Final   03/19/2024 11:54  150 - 450 x10*3/uL Final     No results found for: \"MPV\"  Neutrophils %   Date/Time Value Ref Range Status   06/19/2024 10:19 AM 45.5 40.0 - 80.0 % Final   03/19/2024 11:54 AM 46.0 40.0 - 80.0 % Final   02/26/2024 02:38 PM 35.7 40.0 - 80.0 % Final     Immature Granulocytes %, Automated   Date/Time Value Ref Range Status   09/18/2024 04:14 PM 0.2 0.0 - 0.9 % Final     Comment:     Immature Granulocyte Count (IG) includes promyelocytes, myelocytes and metamyelocytes but does not include bands. Percent differential counts (%) should be interpreted in the context of the absolute cell counts (cells/UL).   06/19/2024 10:19 AM 0.2 0.0 - 0.9 % Final     Comment:     Immature Granulocyte Count (IG) includes promyelocytes, myelocytes and metamyelocytes but does not include bands. Percent differential counts (%) should be interpreted in the context of the absolute cell counts (cells/UL).   03/19/2024 11:54 AM 0.2 0.0 - 0.9 % Final     Comment:     Immature Granulocyte Count (IG) includes promyelocytes, myelocytes and metamyelocytes but does not include bands. " Percent differential counts (%) should be interpreted in the context of the absolute cell counts (cells/UL).     Lymphocytes %, Manual   Date/Time Value Ref Range Status   09/18/2024 04:14 PM 65.0 13.0 - 44.0 % Final   01/01/2024 08:54 AM 24.1 13.0 - 44.0 % Final     Lymphocytes %   Date/Time Value Ref Range Status   06/19/2024 10:19 AM 46.7 13.0 - 44.0 % Final   03/19/2024 11:54 AM 46.2 13.0 - 44.0 % Final   02/26/2024 02:38 PM 55.8 13.0 - 44.0 % Final     Monocytes %, Manual   Date/Time Value Ref Range Status   09/18/2024 04:14 PM 6.0 2.0 - 10.0 % Final   01/01/2024 08:54 AM 6.0 2.0 - 10.0 % Final     Monocytes %   Date/Time Value Ref Range Status   06/19/2024 10:19 AM 5.7 2.0 - 10.0 % Final   03/19/2024 11:54 AM 6.1 2.0 - 10.0 % Final   02/26/2024 02:38 PM 6.1 2.0 - 10.0 % Final     Eosinophils %, Manual   Date/Time Value Ref Range Status   09/18/2024 04:14 PM 1.0 0.0 - 6.0 % Final   01/01/2024 08:54 AM 0.0 0.0 - 6.0 % Final     Eosinophils %   Date/Time Value Ref Range Status   06/19/2024 10:19 AM 1.2 0.0 - 6.0 % Final   03/19/2024 11:54 AM 1.0 0.0 - 6.0 % Final   02/26/2024 02:38 PM 0.7 0.0 - 6.0 % Final     Basophils %, Manual   Date/Time Value Ref Range Status   09/18/2024 04:14 PM 1.0 0.0 - 2.0 % Final   01/01/2024 08:54 AM 0.9 0.0 - 2.0 % Final     Basophils %   Date/Time Value Ref Range Status   06/19/2024 10:19 AM 0.7 0.0 - 2.0 % Final   03/19/2024 11:54 AM 0.5 0.0 - 2.0 % Final   02/26/2024 02:38 PM 1.4 0.0 - 2.0 % Final     Neutrophils Absolute   Date/Time Value Ref Range Status   06/19/2024 10:19 AM 1.85 1.20 - 7.70 x10*3/uL Final     Comment:     Percent differential counts (%) should be interpreted in the context of the absolute cell counts (cells/uL).   03/19/2024 11:54 AM 1.90 1.20 - 7.70 x10*3/uL Final     Comment:     Percent differential counts (%) should be interpreted in the context of the absolute cell counts (cells/uL).   02/26/2024 02:38 PM 1.05 (L) 1.20 - 7.70 x10*3/uL Final     Comment:  "    Percent differential counts (%) should be interpreted in the context of the absolute cell counts (cells/uL).     Immature Granulocytes Absolute, Automated   Date/Time Value Ref Range Status   09/18/2024 04:14 PM 0.01 0.00 - 0.70 x10*3/uL Final   06/19/2024 10:19 AM 0.01 0.00 - 0.70 x10*3/uL Final   03/19/2024 11:54 AM 0.01 0.00 - 0.70 x10*3/uL Final     Lymphocytes Absolute   Date/Time Value Ref Range Status   06/19/2024 10:19 AM 1.90 1.20 - 4.80 x10*3/uL Final   03/19/2024 11:54 AM 1.91 1.20 - 4.80 x10*3/uL Final   02/26/2024 02:38 PM 1.64 1.20 - 4.80 x10*3/uL Final     Monocytes Absolute   Date/Time Value Ref Range Status   06/19/2024 10:19 AM 0.23 0.10 - 1.00 x10*3/uL Final   03/19/2024 11:54 AM 0.25 0.10 - 1.00 x10*3/uL Final   02/26/2024 02:38 PM 0.18 0.10 - 1.00 x10*3/uL Final     Eosinophils Absolute   Date/Time Value Ref Range Status   06/19/2024 10:19 AM 0.05 0.00 - 0.70 x10*3/uL Final   03/19/2024 11:54 AM 0.04 0.00 - 0.70 x10*3/uL Final   02/26/2024 02:38 PM 0.02 0.00 - 0.70 x10*3/uL Final     Eosinophils Absolute, Manual   Date/Time Value Ref Range Status   09/18/2024 04:14 PM 0.04 0.00 - 0.70 x10*3/uL Final   01/01/2024 08:54 AM 0.00 0.00 - 0.70 x10*3/uL Final     Basophils Absolute   Date/Time Value Ref Range Status   06/19/2024 10:19 AM 0.03 0.00 - 0.10 x10*3/uL Final     Comment:     Automated WBC differential has been confirmed by manual smear.   03/19/2024 11:54 AM 0.02 0.00 - 0.10 x10*3/uL Final     Comment:     Automated WBC differential has been confirmed by manual smear.   02/26/2024 02:38 PM 0.04 0.00 - 0.10 x10*3/uL Final     Basophils Absolute, Manual   Date/Time Value Ref Range Status   09/18/2024 04:14 PM 0.04 0.00 - 0.10 x10*3/uL Final   01/01/2024 08:54 AM 0.10 0.00 - 0.10 x10*3/uL Final       No components found for: \"PT\"  aPTT   Date/Time Value Ref Range Status   01/01/2024 08:54 AM 31 27 - 38 seconds Final       Assessment/Plan  3 month follow up  Follow up with referral to Ortho " for joint popping and pain  Do blood work today  Continue use of Oxycodone 10 mg for pain management  Continue use of Hydroxyura for Disease modification

## 2025-01-10 LAB
HEMOGLOBIN A2: 4.3 % (ref 2–3.5)
HEMOGLOBIN F: 17 % (ref 0–2)
HEMOGLOBIN IDENTIFICATION INTERPRETATION: ABNORMAL
HEMOGLOBIN S: 78.7 %
PATH REVIEW-HGB IDENTIFICATION: ABNORMAL

## 2025-01-22 ENCOUNTER — APPOINTMENT (OUTPATIENT)
Dept: RADIOLOGY | Facility: HOSPITAL | Age: 25
End: 2025-01-22
Payer: MEDICAID

## 2025-01-30 ENCOUNTER — HOSPITAL ENCOUNTER (OUTPATIENT)
Dept: RADIOLOGY | Facility: HOSPITAL | Age: 25
Discharge: HOME | End: 2025-01-30
Payer: MEDICAID

## 2025-01-30 DIAGNOSIS — R06.02 SOB (SHORTNESS OF BREATH): ICD-10-CM

## 2025-01-30 PROCEDURE — 71250 CT THORAX DX C-: CPT

## 2025-02-06 ENCOUNTER — TELEPHONE (OUTPATIENT)
Dept: ADMISSION | Facility: HOSPITAL | Age: 25
End: 2025-02-06
Payer: MEDICAID

## 2025-02-06 DIAGNOSIS — D57.00 SICKLE CELL DISEASE WITH CRISIS (MULTI): ICD-10-CM

## 2025-02-06 PROCEDURE — RXMED WILLOW AMBULATORY MEDICATION CHARGE

## 2025-02-06 RX ORDER — OXYCODONE HYDROCHLORIDE 10 MG/1
10 TABLET ORAL EVERY 6 HOURS PRN
Qty: 25 TABLET | Refills: 0 | Status: SHIPPED | OUTPATIENT
Start: 2025-02-06

## 2025-02-06 NOTE — TELEPHONE ENCOUNTER
Pt is requesting a refill on Oxycodone 10mg to be sent to [facility]. Message sent to the team.

## 2025-02-09 PROCEDURE — RXMED WILLOW AMBULATORY MEDICATION CHARGE

## 2025-02-10 ENCOUNTER — PHARMACY VISIT (OUTPATIENT)
Dept: PHARMACY | Facility: CLINIC | Age: 25
End: 2025-02-10
Payer: MEDICAID

## 2025-02-11 ENCOUNTER — APPOINTMENT (OUTPATIENT)
Dept: PRIMARY CARE | Facility: CLINIC | Age: 25
End: 2025-02-11
Payer: MEDICAID

## 2025-02-12 ENCOUNTER — PHARMACY VISIT (OUTPATIENT)
Dept: PHARMACY | Facility: CLINIC | Age: 25
End: 2025-02-12
Payer: MEDICAID

## 2025-02-12 ENCOUNTER — TELEMEDICINE (OUTPATIENT)
Dept: PRIMARY CARE | Facility: CLINIC | Age: 25
End: 2025-02-12
Payer: MEDICAID

## 2025-02-12 DIAGNOSIS — B34.9 VIRAL ILLNESS: Primary | ICD-10-CM

## 2025-02-12 DIAGNOSIS — D57.00 SICKLE CELL DISEASE WITH CRISIS (MULTI): ICD-10-CM

## 2025-02-12 PROCEDURE — 99213 OFFICE O/P EST LOW 20 MIN: CPT | Performed by: NURSE PRACTITIONER

## 2025-02-12 PROCEDURE — RXMED WILLOW AMBULATORY MEDICATION CHARGE

## 2025-02-12 RX ORDER — OSELTAMIVIR PHOSPHATE 75 MG/1
75 CAPSULE ORAL 2 TIMES DAILY
Qty: 10 CAPSULE | Refills: 0 | Status: SHIPPED | OUTPATIENT
Start: 2025-02-12 | End: 2025-02-17

## 2025-02-12 NOTE — PROGRESS NOTES
Subjective   Yakov Jimenez is a 24 y.o. male who presents for No chief complaint on file..  HPI  Virtual or Telephone Consent    An interactive audio and video telecommunication system which permits real time communications between the patient (at the originating site) and provider (at the distant site) was utilized to provide this telehealth service.   Verbal consent was requested and obtained from Yakov Jimenez on this date, 02/12/25 for a telehealth visit.     Mr. Jimenez is a 23 yo M for same day visit for viral illness.   PMH: Sickle Cell Anemia, well connected with specialty.  Mr. Jimenez notes that he has been feeling ill for the last 2 days. He notes fatigue as the most apparent current symptom. He has had a fever on and off, does feel like he a fever right now and it is breaking. Notes cough that is mild, but non productive. He has been taking OTC medications for symptom management. Continues to hydrate well. Also drinking hot tea.    All systems reviewed. Review of systems negative except for noted positives in HPI    Objective     There were no vitals taken for this visit.   Vital signs noted and reviewed.       Physical Exam    PE deferred 2/2 virtual visit    Assessment/Plan   Problem List Items Addressed This Visit       Sickle cell disease (Multi)    Relevant Medications    oseltamivir (Tamiflu) 75 mg capsule     Other Visit Diagnoses       Viral illness    -  Primary    Relevant Medications    oseltamivir (Tamiflu) 75 mg capsule

## 2025-02-19 ENCOUNTER — APPOINTMENT (OUTPATIENT)
Dept: ORTHOPEDIC SURGERY | Facility: HOSPITAL | Age: 25
End: 2025-02-19
Payer: MEDICAID

## 2025-03-05 DIAGNOSIS — D57.00 SICKLE CELL DISEASE WITH CRISIS (MULTI): ICD-10-CM

## 2025-03-06 PROCEDURE — RXMED WILLOW AMBULATORY MEDICATION CHARGE

## 2025-03-06 RX ORDER — OXYCODONE HYDROCHLORIDE 10 MG/1
10 TABLET ORAL EVERY 6 HOURS PRN
Qty: 25 TABLET | Refills: 0 | Status: SHIPPED | OUTPATIENT
Start: 2025-03-06

## 2025-03-07 ENCOUNTER — PHARMACY VISIT (OUTPATIENT)
Dept: PHARMACY | Facility: CLINIC | Age: 25
End: 2025-03-07
Payer: MEDICAID

## 2025-03-19 ENCOUNTER — OFFICE VISIT (OUTPATIENT)
Dept: HEMATOLOGY/ONCOLOGY | Facility: HOSPITAL | Age: 25
End: 2025-03-19
Payer: MEDICAID

## 2025-03-19 ENCOUNTER — LAB (OUTPATIENT)
Dept: LAB | Facility: HOSPITAL | Age: 25
End: 2025-03-19
Payer: MEDICAID

## 2025-03-19 VITALS
TEMPERATURE: 98.4 F | SYSTOLIC BLOOD PRESSURE: 119 MMHG | WEIGHT: 176 LBS | HEART RATE: 97 BPM | OXYGEN SATURATION: 99 % | BODY MASS INDEX: 23.87 KG/M2 | DIASTOLIC BLOOD PRESSURE: 56 MMHG | RESPIRATION RATE: 17 BRPM

## 2025-03-19 DIAGNOSIS — D57.00 SICKLE CELL DISEASE WITH CRISIS (MULTI): ICD-10-CM

## 2025-03-19 DIAGNOSIS — D57.1 SICKLE CELL DISEASE WITHOUT CRISIS (MULTI): Primary | ICD-10-CM

## 2025-03-19 DIAGNOSIS — D57.1 SICKLE CELL DISEASE WITHOUT CRISIS (MULTI): ICD-10-CM

## 2025-03-19 LAB
AMPHETAMINES UR QL SCN: ABNORMAL
APPEARANCE UR: CLEAR
BARBITURATES UR QL SCN: ABNORMAL
BENZODIAZ UR QL SCN: ABNORMAL
BILIRUB UR STRIP.AUTO-MCNC: NEGATIVE MG/DL
BZE UR QL SCN: ABNORMAL
CANNABINOIDS UR QL SCN: ABNORMAL
COLOR UR: NORMAL
CREAT UR-MCNC: 124.5 MG/DL (ref 20–370)
FENTANYL+NORFENTANYL UR QL SCN: ABNORMAL
GLUCOSE UR STRIP.AUTO-MCNC: NORMAL MG/DL
KETONES UR STRIP.AUTO-MCNC: NEGATIVE MG/DL
LEUKOCYTE ESTERASE UR QL STRIP.AUTO: NEGATIVE
METHADONE UR QL SCN: ABNORMAL
MICROALBUMIN UR-MCNC: 7.9 MG/L
MICROALBUMIN/CREAT UR: 6.3 UG/MG CREAT
NITRITE UR QL STRIP.AUTO: NEGATIVE
OPIATES UR QL SCN: ABNORMAL
OXYCODONE+OXYMORPHONE UR QL SCN: ABNORMAL
PCP UR QL SCN: ABNORMAL
PH UR STRIP.AUTO: 6.5 [PH]
PROT UR STRIP.AUTO-MCNC: NEGATIVE MG/DL
RBC # UR STRIP.AUTO: NEGATIVE MG/DL
SP GR UR STRIP.AUTO: 1.01
UROBILINOGEN UR STRIP.AUTO-MCNC: NORMAL MG/DL

## 2025-03-19 PROCEDURE — 80307 DRUG TEST PRSMV CHEM ANLYZR: CPT

## 2025-03-19 PROCEDURE — RXMED WILLOW AMBULATORY MEDICATION CHARGE

## 2025-03-19 PROCEDURE — 81003 URINALYSIS AUTO W/O SCOPE: CPT

## 2025-03-19 PROCEDURE — 99214 OFFICE O/P EST MOD 30 MIN: CPT | Performed by: NURSE PRACTITIONER

## 2025-03-19 PROCEDURE — 80361 OPIATES 1 OR MORE: CPT

## 2025-03-19 PROCEDURE — 82043 UR ALBUMIN QUANTITATIVE: CPT

## 2025-03-19 RX ORDER — FOLIC ACID 1 MG/1
1 TABLET ORAL DAILY
Qty: 90 TABLET | Refills: 2 | Status: SHIPPED | OUTPATIENT
Start: 2025-03-19

## 2025-03-19 ASSESSMENT — PAIN SCALES - GENERAL: PAINLEVEL_OUTOF10: 2

## 2025-03-19 NOTE — PROGRESS NOTES
COMPREHENSIVE SICKLE CELL CLINIC NOTE   Date of Encounter: @today@    Name:  Yakov Jimenez  Age: 24  MRN: [id number]  Sickle Cell Genotype: SS  Care Plan last updated and signed:   Name of PCP:      ASSESSMENT AND PLAN  Yakov Jimenez is 24 y.o. black male  with     History of Hb SS Sickle cell disease with (acute, chronic, acute on chronic pain) and (KENDELL, OUD). Pain regimen will be as follows  Oral Tylenol and or ibuprofen as needed for mild to moderate pain   Oral  oxycodone/acetaminophen.  Discussed non pharmacologic methods of pain control   Reviewed OARRS     Encounter for management of disease modifying therapy. Mr. Jimenez is currently on Hydroxyurea. We will proceed with/without changes to current management     Chronic anemia secondary to SCD with a hemoglobin of 8.9. This is around her baseline HB   Daily folic acid     Does not use nocturnal hypoxia on supplemental night time oxygen     Constipation is well controlled with diet and lifestyle      Iron overload secondary to chronic transfusions, not on chelation therapy, Ferretin level 129 on 1-.      7. No chronic kidney disease with Cr 0.80 and eGFR of >90.      8. No Hypertension noted        9. Follow up office visit   Subspecialty visits Primary Care, Orthopedics for shoulder popping  Labs needed at next OV will be a CBC, CMP, Ferritin, LDH, retic, type and screen , Ionized calcium, Hemoglobin identification         Interval History    Subjective  24 year old bi-racial patient with SS Sickle Cell disease. He take Hydroxyurea for disease modification. He does not do PRBC Exchanges. He does not use supplemental oxygen. He was hospitalized Dec 2023 for pneumonia and Pain crisis. He suffers from fatigue and sees his PCP for that. He is able to pop both of his shoulders but does not experience pain when that happens. He saw his eye doctor this year. He has not seen a dentist in awhile.  HPI    Review of Systems - Oncology     Objective   BSA: 2.01  meters squared  /56 (BP Location: Right arm, Patient Position: Sitting, BP Cuff Size: Adult)   Pulse 97   Temp 36.9 °C (98.4 °F) (Skin)   Resp 17   Wt 79.8 kg (176 lb)   SpO2 99%   BMI 23.87 kg/m²      has a past medical history of History of panretinal photocoagulation (2022) and Sickle cell anemia (Multi).   has a past surgical history that includes Other surgical history (07/14/2022) and MR angio head wo IV contrast (7/26/2019).  Family History   Problem Relation Name Age of Onset    Asthma Mother      Glaucoma Neg Hx      Macular degeneration Neg Hx       Oncology History    No history exists.       Yakov Jimenez  reports that he has never smoked. He has never used smokeless tobacco.  He  reports no history of alcohol use.  He  reports no history of drug use.    Physical Exam  Vitals reviewed.   Constitutional:       Appearance: Normal appearance. He is normal weight.   HENT:      Head: Normocephalic and atraumatic.      Right Ear: Tympanic membrane, ear canal and external ear normal.      Left Ear: Tympanic membrane, ear canal and external ear normal.      Nose: Nose normal.      Mouth/Throat:      Mouth: Mucous membranes are moist.   Eyes:      Pupils: Pupils are equal, round, and reactive to light.   Cardiovascular:      Rate and Rhythm: Regular rhythm. Tachycardia present.      Pulses: Normal pulses.      Heart sounds: Normal heart sounds.   Pulmonary:      Effort: Pulmonary effort is normal.      Breath sounds: Normal breath sounds.   Abdominal:      General: Abdomen is flat. Bowel sounds are normal.      Palpations: Abdomen is soft.   Musculoskeletal:         General: Normal range of motion.      Cervical back: Normal range of motion and neck supple.   Skin:     General: Skin is warm and dry.      Capillary Refill: Capillary refill takes 2 to 3 seconds.   Neurological:      General: No focal deficit present.      Mental Status: He is alert. Mental status is at baseline.   Psychiatric:          Mood and Affect: Mood normal.         Behavior: Behavior normal.       Labs   @labs@    No visits with results within 2 Day(s) from this visit.   Latest known visit with results is:   Lab on 01/09/2025   Component Date Value Ref Range Status    Retic % 01/09/2025 5.0 (H)  0.5 - 2.0 % Final    Retic Absolute 01/09/2025 0.096  0.022 - 0.118 x10*6/uL Final    Reticulocyte Hemoglobin 01/09/2025 44 (H)  28 - 38 pg Final    Immature Retic fraction 01/09/2025 27.3 (H)  <=16.0 % Final    Ferritin 01/09/2025 129  20 - 300 ng/mL Final    Glucose 01/09/2025 84  74 - 99 mg/dL Final    Sodium 01/09/2025 139  136 - 145 mmol/L Final    Potassium 01/09/2025 4.0  3.5 - 5.3 mmol/L Final    Chloride 01/09/2025 105  98 - 107 mmol/L Final    Bicarbonate 01/09/2025 29  21 - 32 mmol/L Final    Anion Gap 01/09/2025 9 (L)  10 - 20 mmol/L Final    Urea Nitrogen 01/09/2025 8  6 - 23 mg/dL Final    Creatinine 01/09/2025 0.80  0.50 - 1.30 mg/dL Final    eGFR 01/09/2025 >90  >60 mL/min/1.73m*2 Final    Calcium 01/09/2025 9.2  8.6 - 10.3 mg/dL Final    Albumin 01/09/2025 4.5  3.4 - 5.0 g/dL Final    Alkaline Phosphatase 01/09/2025 67  33 - 120 U/L Final    Total Protein 01/09/2025 7.8  6.4 - 8.2 g/dL Final    AST 01/09/2025 22  9 - 39 U/L Final    Bilirubin, Total 01/09/2025 1.8 (H)  0.0 - 1.2 mg/dL Final    ALT 01/09/2025 17  10 - 52 U/L Final    WBC 01/09/2025 4.1 (L)  4.4 - 11.3 x10*3/uL Final    nRBC 01/09/2025 50.1 (H)  0.0 - 0.0 /100 WBCs Final    RBC 01/09/2025 1.90 (L)  4.50 - 5.90 x10*6/uL Final    Hemoglobin 01/09/2025 8.9 (L)  13.5 - 17.5 g/dL Final    Hematocrit 01/09/2025 23.9 (L)  41.0 - 52.0 % Final    MCV 01/09/2025 126 (H)  80 - 100 fL Final    MCH 01/09/2025 46.8 (H)  26.0 - 34.0 pg Final    MCHC 01/09/2025 37.2 (H)  32.0 - 36.0 g/dL Final    RDW 01/09/2025 18.8 (H)  11.5 - 14.5 % Final    Platelets 01/09/2025 311  150 - 450 x10*3/uL Final    Neutrophils % 01/09/2025 42.9  40.0 - 80.0 % Final    Immature Granulocytes %,  Automated 01/09/2025 0.2  0.0 - 0.9 % Final    Lymphocytes % 01/09/2025 50.4  13.0 - 44.0 % Final    Monocytes % 01/09/2025 5.1  2.0 - 10.0 % Final    Eosinophils % 01/09/2025 0.7  0.0 - 6.0 % Final    Basophils % 01/09/2025 0.7  0.0 - 2.0 % Final    Neutrophils Absolute 01/09/2025 1.76  1.20 - 7.70 x10*3/uL Final    Immature Granulocytes Absolute, Au* 01/09/2025 0.01  0.00 - 0.70 x10*3/uL Final    Lymphocytes Absolute 01/09/2025 2.07  1.20 - 4.80 x10*3/uL Final    Monocytes Absolute 01/09/2025 0.21  0.10 - 1.00 x10*3/uL Final    Eosinophils Absolute 01/09/2025 0.03  0.00 - 0.70 x10*3/uL Final    Basophils Absolute 01/09/2025 0.03  0.00 - 0.10 x10*3/uL Final    LDH 01/09/2025 262 (H)  84 - 246 U/L Final    ABO TYPE 01/09/2025 O   Final    Rh TYPE 01/09/2025 POS   Final    ANTIBODY SCREEN 01/09/2025 NEG   Final    Hemoglobin F 01/09/2025 17.0 (H)  0.0 - 2.0 % Final    Hemoglobin S 01/09/2025 78.7 (H)  <=0.0 % Final    Hemoglobin A2 01/09/2025 4.3 (H)  2.0 - 3.5 % Final    Hemoglobin Identification Interpre* 01/09/2025 See Below (A)  Normal Final    Pathologist Review-Hemoglobin Iden* 01/09/2025 Electronically signed out by Leslie Roy MD on 1/10/25 at 6:07 PM.  By the signature on this report, the individual or group listed as making the Final Interpretation/Diagnosis certifies that they have reviewed this case.   Final            Katherine Pendleton, LIZBETH-CNP          Immunizations:  Screening: Mammogram, Pap smear, Colonoscopy, PSA   Disease modifying therapy: Hydroxyurea, Voxelotor, crizanlizumab, Endari  Dose and Frequency   Hospitalizations in past 12 months: January 2024  ED and ACC visits in past 12 months: December 2024       Cardio/Pulmonary   Last Echo date 6-8-2022  Results No regional wall abnormalities  EF:60-65%  TRJV:2.20  Cardiomyopathy N/A  HTN: N/A  Cardiac MRI N/A  Results   Cardiac Cath date and results    PAH N/A  PVH  EKG: NSR with Non-specific T-wave abnormalities  Sleep study   Results   No  Supplemental oxygen     Renal/Nephrology  Urinalysis Ordered 3-  Urine albuminuria Ordered 3-  Renal panel   Cystatin C   Renal disease present (YES/NO)  Nephrology follow up (Provider and date)    Eyes  Last eye examination: January 2025.  Results: Got new glasses  Next eye examination: 2026    Dental examination  Last Dental Examination: To be scheduled  Results:  Next dental examination:     Iron overload   Recent ferritin level: 129  Recent ferriscan: N/A  Chelation therapy: N/A    Transfusion history  Alloantibodies and type: O Pos   Full exchange   Frequency   Indication   Partial/manual:  Simple:  Date of Blood Transfusion Consent:    Neuro/Psych  Depression and Anxiety Screening: Denies  Provider and last appointment:  Neurocognitive Testing:    Social Issue  Needs addressed: Denies  Followed up on:   Occupation: Unemployed and looking for a job

## 2025-03-20 ENCOUNTER — SOCIAL WORK (OUTPATIENT)
Dept: HEMATOLOGY/ONCOLOGY | Facility: HOSPITAL | Age: 25
End: 2025-03-20
Payer: MEDICAID

## 2025-03-20 ENCOUNTER — PHARMACY VISIT (OUTPATIENT)
Dept: PHARMACY | Facility: CLINIC | Age: 25
End: 2025-03-20
Payer: MEDICAID

## 2025-03-20 NOTE — PROGRESS NOTES
PSYCHOSOCIAL ASSESSMENT     Demographic Information  Yakov Jimenez  2000  [id number]  Preferred Name: [place]  Assessment Type:  Comprehensive Care Asssessment  Date of assessment: 03/19/25  Provider(s): Katherine Pendleton NP  Diagnosis: SCD  Person(s) present during assessment: patient  Primary language: english  Interpretive services used: none    Distress Thermometer  Distress Score: N/A  Distress Concerns: N/A                Living Environment/Support Systems  Partner Status: Single  Children: none  Support systems: Family and friends  Primary caregiver: Self  Current Living Situation: House Other  Resides with: Grandparents who own home  Concerns with Housing Environment: None  Comments:     Safety  Patient safe at home? yes  History of Domestic Violence: denies      Functional Status  Functional status: Independent  Patient currently ambulates: Independently  Patient has following equipment: Oxygen  Other physical health issues that the patient is experiencing: denies  What supports are in place to assist the patient: None  Transportation:  Insurance: [facility]  Comments:         Finances/Insurance  Insurance: [facility]  Does the patient have any pending insurance applications: No   Hospital Financial Assistance: None  Patient's income source: Family Support  Work History: Pt currently looking for employment, previously referred to employment services but did not work for pt   History: No  Background  Food Insecurity: No   Does the patient have any financial concerns: No   Any difficulties affording medications? No   Applicable [place]: Yes, [facility] which patient has previously utilized  Comments:      Advance Directives  No Advance Directives- Additional information provided  Health Care Agent Status:Not Activated  Health Care Agent, When applicable: Anderson Jimenez ([phone])  Comments:    Legal Involvement  Relevant current or previous legal concerns: none     Restorationism or Spiritual  Identity  Comments: none    Mental Health  Active SI/HI: No  Mental Health Diagnosis, if applicable: denies  Mood: Good, no complaints  Concerns relating to substance use (including alcohol/tobacco): none  Patient identified coping skills: Designing and playing video games  Learning Preferences: no preference  Cognitive Comments: denies  Relevant Providers: n/a  Comments:       Assessment  Potential Barriers to Care:   employment  Patient Strengths:  Motivated for Treatment and Strong Support System    Plan  Referrals: N/A  Applications: N/A  Other: N/A

## 2025-03-21 LAB
6MAM UR CFM-MCNC: <25 NG/ML
CODEINE UR CFM-MCNC: <50 NG/ML
HYDROCODONE CTO UR CFM-MCNC: <25 NG/ML
HYDROMORPHONE UR CFM-MCNC: <25 NG/ML
MORPHINE UR CFM-MCNC: <50 NG/ML
NORHYDROCODONE UR CFM-MCNC: <25 NG/ML
NOROXYCODONE UR CFM-MCNC: 949 NG/ML
OXYCODONE UR CFM-MCNC: 67 NG/ML
OXYMORPHONE UR CFM-MCNC: 424 NG/ML

## 2025-04-07 PROCEDURE — RXMED WILLOW AMBULATORY MEDICATION CHARGE

## 2025-04-08 ENCOUNTER — APPOINTMENT (OUTPATIENT)
Dept: ORTHOPEDIC SURGERY | Facility: CLINIC | Age: 25
End: 2025-04-08
Payer: MEDICAID

## 2025-04-08 ENCOUNTER — HOSPITAL ENCOUNTER (OUTPATIENT)
Dept: RADIOLOGY | Facility: CLINIC | Age: 25
End: 2025-04-08
Payer: MEDICAID

## 2025-04-08 ENCOUNTER — APPOINTMENT (OUTPATIENT)
Dept: RADIOLOGY | Facility: CLINIC | Age: 25
End: 2025-04-08
Payer: MEDICAID

## 2025-04-09 ENCOUNTER — APPOINTMENT (OUTPATIENT)
Dept: HEMATOLOGY/ONCOLOGY | Facility: HOSPITAL | Age: 25
End: 2025-04-09
Payer: MEDICAID

## 2025-04-09 ENCOUNTER — PHARMACY VISIT (OUTPATIENT)
Dept: PHARMACY | Facility: CLINIC | Age: 25
End: 2025-04-09
Payer: MEDICAID

## 2025-04-16 ENCOUNTER — HOSPITAL ENCOUNTER (OUTPATIENT)
Dept: RADIOLOGY | Facility: CLINIC | Age: 25
Discharge: HOME | End: 2025-04-16
Payer: MEDICAID

## 2025-04-16 ENCOUNTER — APPOINTMENT (OUTPATIENT)
Dept: ORTHOPEDIC SURGERY | Facility: CLINIC | Age: 25
End: 2025-04-16
Payer: MEDICAID

## 2025-04-16 DIAGNOSIS — D57.1 SICKLE CELL DISEASE WITHOUT CRISIS (MULTI): ICD-10-CM

## 2025-04-16 DIAGNOSIS — M25.512 LEFT SHOULDER PAIN, UNSPECIFIED CHRONICITY: ICD-10-CM

## 2025-04-16 DIAGNOSIS — M25.511 RIGHT SHOULDER PAIN, UNSPECIFIED CHRONICITY: ICD-10-CM

## 2025-04-16 PROCEDURE — 99214 OFFICE O/P EST MOD 30 MIN: CPT | Performed by: STUDENT IN AN ORGANIZED HEALTH CARE EDUCATION/TRAINING PROGRAM

## 2025-04-16 PROCEDURE — 99204 OFFICE O/P NEW MOD 45 MIN: CPT | Performed by: STUDENT IN AN ORGANIZED HEALTH CARE EDUCATION/TRAINING PROGRAM

## 2025-04-16 PROCEDURE — 73030 X-RAY EXAM OF SHOULDER: CPT | Mod: 50

## 2025-04-16 ASSESSMENT — PAIN SCALES - GENERAL: PAINLEVEL_OUTOF10: 2

## 2025-04-16 ASSESSMENT — PAIN DESCRIPTION - DESCRIPTORS: DESCRIPTORS: PATIENT UNABLE TO DESCRIBE

## 2025-04-16 ASSESSMENT — PAIN - FUNCTIONAL ASSESSMENT: PAIN_FUNCTIONAL_ASSESSMENT: 0-10

## 2025-04-16 NOTE — PROGRESS NOTES
CHIEF COMPLAINT:   Chief Complaint   Patient presents with    Left Shoulder - Pain    Right Shoulder - Pain     History: 25 y.o. male presents to the office today for evaluation of his bilateral shoulders.  The patient is currently looking for work.  He does have a history of sickle cell disease.  He states that he has noted popping in both of his shoulders as well as his ankles for the last year.  No specific injury remember.  The popping is not painful.  Seems to be able to exacerbated with overhead activities.  Has not had any specific treatment yet.    Past medical history, past surgical history, medications, allergies, family history, social history, and review of systems were reviewed today.    A 12 point review of systems was negative other than as stated in the HPI.    Medical History[1]     Allergies[2]     Surgical History[3]     Family History[4]     Social History     Socioeconomic History    Marital status: Single     Spouse name: Not on file    Number of children: Not on file    Years of education: Not on file    Highest education level: Not on file   Occupational History    Not on file   Tobacco Use    Smoking status: Never    Smokeless tobacco: Never   Vaping Use    Vaping status: Never Used   Substance and Sexual Activity    Alcohol use: Never    Drug use: Never    Sexual activity: Not on file   Other Topics Concern    Not on file   Social History Narrative    Not on file     Social Drivers of Health     Financial Resource Strain: Low Risk  (12/28/2023)    Overall Financial Resource Strain (CARDIA)     Difficulty of Paying Living Expenses: Not hard at all   Food Insecurity: Food Insecurity Present (11/13/2024)    Hunger Vital Sign     Worried About Running Out of Food in the Last Year: Often true     Ran Out of Food in the Last Year: Often true   Transportation Needs: No Transportation Needs (12/28/2023)    PRAPARE - Transportation     Lack of Transportation (Medical): No     Lack of Transportation  (Non-Medical): No   Physical Activity: Not on file   Stress: Not on file   Social Connections: Not on file   Intimate Partner Violence: Not on file   Housing Stability: Low Risk  (12/28/2023)    Housing Stability Vital Sign     Unable to Pay for Housing in the Last Year: No     Number of Places Lived in the Last Year: 1     Unstable Housing in the Last Year: No        CURRENT MEDICATIONS:   Current Medications[5]    Physical Examination:      6/19/2024     8:51 AM 9/18/2024     2:14 PM 9/18/2024     2:54 PM 10/21/2024    12:54 PM 1/8/2025     3:09 PM 1/9/2025     1:51 PM 3/19/2025     2:27 PM   Vitals   Systolic 103 117 118 115 115 116 119   Diastolic 60 64 71 60 70 68 56   BP Location Left arm Left arm Left arm Left arm   Right arm   Heart Rate 79 72 70 93 81 86 97   Temp 36.7 °C (98.1 °F) 36.6 °C (97.9 °F) 36.6 °C (97.9 °F) 37 °C (98.6 °F) 36.4 °C (97.5 °F) 36.5 °C (97.7 °F) 36.9 °C (98.4 °F)   Resp 16   16  20 17   Weight (lb) 176.59 179.8 178 174.16 175 171.96 176   BMI 23.95 kg/m2 24.39 kg/m2 24.14 kg/m2 23.62 kg/m2 23.73 kg/m2 23.32 kg/m2 23.87 kg/m2   BSA (m2) 2.02 m2 2.04 m2 2.02 m2 2 m2 2.01 m2 1.99 m2 2.01 m2   Visit Report Report Report    Report Report    Report Report Report Report Report      There is no height or weight on file to calculate BMI.    Well-appearing, appears stated age, pleasant and cooperative, appropriate mood and behavior. Height and weight reviewed. Alert and oriented x3.  Auditory function intact.  No acute distress.  Intact ocular function, EARNEST, EOMI. Breathing is unlabored .  There is no evidence of jugular venous distension. Skin appearance is normal without evidence of rash or other lesions. 2+ radial pulses bilaterally, fingers pink and wwp, good capillary refill, no pitting edema. No appreciable lymphadenopathy in bilateral upper extremities. SILT throughout both upper extremities, median/radial/ulnar/musculocutaneous/axillary nerve motor and sensory intact (except for  abnormalities noted in focused musculoskeletal exam section below).     Neck exam: Full range of motion of the neck in flexion/extension and rotational movements. No significant areas of tenderness to palpation in the neck.    On exam of bilateral upper extremities, has symmetric range of motion and strength of both shoulders.  Forward flexion 140, external Tatian of 40, internal Tatian to T12.  Rotator cuff strength is preserved bilaterally.  With passive forward flexion of both shoulders she does have some mild crepitus without pain.    Imaging: Radiographs of the right shoulder performed today.  Personally interpreted by myself.  Preserved glenohumeral joint space.  Preserved acromiohumeral interval.  No acute fractures noted.    Radiographs of the left shoulder performed today.  Personally interpreted by myself.  Preserved glenohumeral joint space.  Preserved acromiohumeral interval.  No acute fractures noted.    There are some changes within the intramedullary canal of both humerus in line with sickle cell disease.    Assessment: Bilateral shoulder painless crepitus    Plan: I long discussion with the patient with treatment options diagnosis.  Discussed with him that overall his exam and imaging is reassuring.  There are no signs of any collapse on his radiographs of either shoulder.  From my standpoint, he may have some subtle instability, and so we can try some physical therapy.  I do not think that the popping is dangerous.  I did tell him that the popping becomes painful, then we may consider advanced imaging but at this time I think physical therapy is all he needs.  Follow-up as needed.    Dragon software was used to dictate this note, please be aware that minor errors in transcription may be present.    Sridhar Silvestre MD    Shoulder/Elbow Surgery  [facility]            [1]   Past Medical History:  Diagnosis Date    History of panretinal  photocoagulation 2022    OD    Sickle cell anemia (Multi)    [2]   Allergies  Allergen Reactions    Ceftriaxone Anaphylaxis   [3]   Past Surgical History:  Procedure Laterality Date    MR HEAD ANGIO WO IV CONTRAST  7/26/2019    MR HEAD ANGIO WO IV CONTRAST 7/26/2019 [facility]    OTHER SURGICAL HISTORY  07/14/2022    Eye surgery   [4]   Family History  Problem Relation Name Age of Onset    Asthma Mother      Glaucoma Neg Hx      Macular degeneration Neg Hx     [5]   Current Outpatient Medications   Medication Sig Dispense Refill    acetaminophen (Tylenol) 500 mg tablet Take 1-2 tablets (500-1,000 mg) by mouth once daily as needed. (Patient not taking: Reported on 1/8/2025)      Advair Diskus 250-50 mcg/dose diskus inhaler Inhale 1 puff every 12 hours. 60 each 2    albuterol 90 mcg/actuation inhaler Inhale 1-2 puffs  EVERY 4 TO 6 HOURS if needed for wheezing.. 18 g 1    amoxicillin (Amoxil) 250 mg capsule TAKE 1 CAPSULE BY MOUTH EVERY 12 HOURS 60 capsule 11    folic acid (Folvite) 1 mg tablet Take 1 tablet (1 mg) by mouth once daily. 90 tablet 2    hydroxyurea (Hydrea) 500 mg capsule Take 4 capsules (2,000 mg total) by mouth once daily.  Take at the same time each day. 120 capsule 3    naloxone (Narcan) 4 mg/0.1 mL nasal spray INSTILL 1 SPRAY IN ONE NOSTRIL AS NEEDED FOR ACCIDENTAL OPIOID OVERDOSE; REPEAT WITH SECOND DOSE IN 5 MINUTES IF NO RESPONSE. 2 each 1    naproxen (Naprosyn) 500 mg tablet Take by mouth.  May take one Naproxen with one tylenol for pain if tylenol alone doesn't work for mild to moderate sickle cell pain      oxyCODONE (Roxicodone) 10 mg immediate release tablet Take 1 tablet (10 mg) by mouth every 6 hours if needed for severe pain (7 - 10). 25 tablet 0     No current facility-administered medications for this visit.

## 2025-05-08 DIAGNOSIS — D57.00 SICKLE CELL DISEASE WITH CRISIS (MULTI): ICD-10-CM

## 2025-05-08 PROCEDURE — RXMED WILLOW AMBULATORY MEDICATION CHARGE

## 2025-05-09 ENCOUNTER — TELEPHONE (OUTPATIENT)
Dept: HEMATOLOGY/ONCOLOGY | Facility: HOSPITAL | Age: 25
End: 2025-05-09
Payer: MEDICAID

## 2025-05-09 RX ORDER — OXYCODONE HYDROCHLORIDE 10 MG/1
10 TABLET ORAL EVERY 6 HOURS PRN
Qty: 25 TABLET | Refills: 0 | OUTPATIENT
Start: 2025-05-09

## 2025-05-09 NOTE — TELEPHONE ENCOUNTER
Patient states he is out of oxycodone and requesting refill be sent to [facility]. Patient aware pharmacy closes shortly and is ok with not picking up until tomorrow. Request sent to fellow on call.

## 2025-05-09 NOTE — TELEPHONE ENCOUNTER
Patient notified on call fellow is unable to prescribe opiods and will need to wait for office to reopen on Monday.

## 2025-05-10 DIAGNOSIS — D57.00 SICKLE CELL DISEASE WITH CRISIS (MULTI): ICD-10-CM

## 2025-05-10 PROCEDURE — RXMED WILLOW AMBULATORY MEDICATION CHARGE

## 2025-05-10 RX ORDER — OXYCODONE HYDROCHLORIDE 10 MG/1
10 TABLET ORAL EVERY 6 HOURS PRN
Qty: 25 TABLET | Refills: 0 | Status: SHIPPED | OUTPATIENT
Start: 2025-05-10

## 2025-05-12 ENCOUNTER — PHARMACY VISIT (OUTPATIENT)
Dept: PHARMACY | Facility: CLINIC | Age: 25
End: 2025-05-12
Payer: MEDICAID

## 2025-06-04 DIAGNOSIS — D57.00 SICKLE CELL DISEASE WITH CRISIS (MULTI): ICD-10-CM

## 2025-06-05 PROCEDURE — RXMED WILLOW AMBULATORY MEDICATION CHARGE

## 2025-06-05 RX ORDER — OXYCODONE HYDROCHLORIDE 10 MG/1
10 TABLET ORAL EVERY 6 HOURS PRN
Qty: 25 TABLET | Refills: 0 | OUTPATIENT
Start: 2025-06-05

## 2025-06-09 ENCOUNTER — PHARMACY VISIT (OUTPATIENT)
Dept: PHARMACY | Facility: CLINIC | Age: 25
End: 2025-06-09
Payer: MEDICAID

## 2025-06-10 ENCOUNTER — HOSPITAL ENCOUNTER (OUTPATIENT)
Facility: HOSPITAL | Age: 25
Setting detail: OBSERVATION
Discharge: HOME | End: 2025-06-12
Attending: HOSPITALIST | Admitting: HOSPITALIST
Payer: MEDICAID

## 2025-06-10 ENCOUNTER — PHARMACY VISIT (OUTPATIENT)
Dept: PHARMACY | Facility: CLINIC | Age: 25
End: 2025-06-10
Payer: MEDICAID

## 2025-06-10 ENCOUNTER — NURSE TRIAGE (OUTPATIENT)
Dept: ADMISSION | Facility: HOSPITAL | Age: 25
End: 2025-06-10
Payer: MEDICAID

## 2025-06-10 ENCOUNTER — APPOINTMENT (OUTPATIENT)
Dept: RADIOLOGY | Facility: HOSPITAL | Age: 25
End: 2025-06-10
Payer: MEDICAID

## 2025-06-10 DIAGNOSIS — D57.00 SICKLE CELL CRISIS (MULTI): Primary | ICD-10-CM

## 2025-06-10 DIAGNOSIS — D57.00 SICKLE CELL DISEASE WITH CRISIS (MULTI): ICD-10-CM

## 2025-06-10 DIAGNOSIS — D57.00 SICKLE CELL PAIN CRISIS (MULTI): ICD-10-CM

## 2025-06-10 LAB
ABO GROUP (TYPE) IN BLOOD: NORMAL
ALBUMIN SERPL BCP-MCNC: 4.9 G/DL (ref 3.4–5)
ALP SERPL-CCNC: 66 U/L (ref 33–120)
ALT SERPL W P-5'-P-CCNC: 36 U/L (ref 10–52)
ANION GAP SERPL CALC-SCNC: 13 MMOL/L (ref 10–20)
ANTIBODY SCREEN: NORMAL
AST SERPL W P-5'-P-CCNC: 35 U/L (ref 9–39)
BASOPHILS # BLD AUTO: 0.01 X10*3/UL (ref 0–0.1)
BASOPHILS NFR BLD AUTO: 0.2 %
BILIRUB SERPL-MCNC: 1.3 MG/DL (ref 0–1.2)
BUN SERPL-MCNC: 7 MG/DL (ref 6–23)
CALCIUM SERPL-MCNC: 9.9 MG/DL (ref 8.6–10.6)
CHLORIDE SERPL-SCNC: 104 MMOL/L (ref 98–107)
CO2 SERPL-SCNC: 23 MMOL/L (ref 21–32)
CREAT SERPL-MCNC: 0.68 MG/DL (ref 0.5–1.3)
EGFRCR SERPLBLD CKD-EPI 2021: >90 ML/MIN/1.73M*2
EOSINOPHIL # BLD AUTO: 0 X10*3/UL (ref 0–0.7)
EOSINOPHIL NFR BLD AUTO: 0 %
ERYTHROCYTE [DISTWIDTH] IN BLOOD BY AUTOMATED COUNT: 22.5 % (ref 11.5–14.5)
GLUCOSE SERPL-MCNC: 84 MG/DL (ref 74–99)
HCT VFR BLD AUTO: 17.5 % (ref 41–52)
HGB BLD-MCNC: 6.5 G/DL (ref 13.5–17.5)
HGB RETIC QN: 32 PG (ref 28–38)
HOWELL-JOLLY BOD BLD QL SMEAR: PRESENT
IMM GRANULOCYTES # BLD AUTO: 0.01 X10*3/UL (ref 0–0.7)
IMM GRANULOCYTES NFR BLD AUTO: 0.2 % (ref 0–0.9)
IMMATURE RETIC FRACTION: 26.9 %
LDH SERPL L TO P-CCNC: 364 U/L (ref 84–246)
LYMPHOCYTES # BLD AUTO: 2.52 X10*3/UL (ref 1.2–4.8)
LYMPHOCYTES NFR BLD AUTO: 59.3 %
MCH RBC QN AUTO: 47.1 PG (ref 26–34)
MCHC RBC AUTO-ENTMCNC: 37.1 G/DL (ref 32–36)
MCV RBC AUTO: 127 FL (ref 80–100)
MONOCYTES # BLD AUTO: 0.2 X10*3/UL (ref 0.1–1)
MONOCYTES NFR BLD AUTO: 4.7 %
NEUTROPHILS # BLD AUTO: 1.51 X10*3/UL (ref 1.2–7.7)
NEUTROPHILS NFR BLD AUTO: 35.6 %
NRBC BLD-RTO: 29.6 /100 WBCS (ref 0–0)
PAPPENHEIMER BOD BLD QL SMEAR: PRESENT
PLATELET # BLD AUTO: 328 X10*3/UL (ref 150–450)
POLYCHROMASIA BLD QL SMEAR: NORMAL
POTASSIUM SERPL-SCNC: 4.2 MMOL/L (ref 3.5–5.3)
PROT SERPL-MCNC: 8.9 G/DL (ref 6.4–8.2)
RBC # BLD AUTO: 1.38 X10*6/UL (ref 4.5–5.9)
RBC MORPH BLD: NORMAL
RETICS #: 0.11 X10*6/UL (ref 0.02–0.12)
RETICS/RBC NFR AUTO: 8 % (ref 0.5–2)
RH FACTOR (ANTIGEN D): NORMAL
SODIUM SERPL-SCNC: 136 MMOL/L (ref 136–145)
STOMATOCYTES BLD QL SMEAR: NORMAL
TARGETS BLD QL SMEAR: NORMAL
WBC # BLD AUTO: 4.3 X10*3/UL (ref 4.4–11.3)

## 2025-06-10 PROCEDURE — 83021 HEMOGLOBIN CHROMOTOGRAPHY: CPT

## 2025-06-10 PROCEDURE — 2500000004 HC RX 250 GENERAL PHARMACY W/ HCPCS (ALT 636 FOR OP/ED): Mod: JZ,SE

## 2025-06-10 PROCEDURE — 86850 RBC ANTIBODY SCREEN: CPT

## 2025-06-10 PROCEDURE — 96375 TX/PRO/DX INJ NEW DRUG ADDON: CPT

## 2025-06-10 PROCEDURE — G0378 HOSPITAL OBSERVATION PER HR: HCPCS

## 2025-06-10 PROCEDURE — 96374 THER/PROPH/DIAG INJ IV PUSH: CPT

## 2025-06-10 PROCEDURE — 71046 X-RAY EXAM CHEST 2 VIEWS: CPT

## 2025-06-10 PROCEDURE — 71046 X-RAY EXAM CHEST 2 VIEWS: CPT | Performed by: RADIOLOGY

## 2025-06-10 PROCEDURE — 80053 COMPREHEN METABOLIC PANEL: CPT

## 2025-06-10 PROCEDURE — 99285 EMERGENCY DEPT VISIT HI MDM: CPT | Mod: 25 | Performed by: STUDENT IN AN ORGANIZED HEALTH CARE EDUCATION/TRAINING PROGRAM

## 2025-06-10 PROCEDURE — RXMED WILLOW AMBULATORY MEDICATION CHARGE

## 2025-06-10 PROCEDURE — 83615 LACTATE (LD) (LDH) ENZYME: CPT

## 2025-06-10 PROCEDURE — 86920 COMPATIBILITY TEST SPIN: CPT

## 2025-06-10 PROCEDURE — 2500000001 HC RX 250 WO HCPCS SELF ADMINISTERED DRUGS (ALT 637 FOR MEDICARE OP): Mod: SE

## 2025-06-10 PROCEDURE — 36415 COLL VENOUS BLD VENIPUNCTURE: CPT

## 2025-06-10 PROCEDURE — 85025 COMPLETE CBC W/AUTO DIFF WBC: CPT

## 2025-06-10 PROCEDURE — 85045 AUTOMATED RETICULOCYTE COUNT: CPT

## 2025-06-10 RX ORDER — ONDANSETRON HYDROCHLORIDE 2 MG/ML
4 INJECTION, SOLUTION INTRAVENOUS EVERY 8 HOURS PRN
Status: DISCONTINUED | OUTPATIENT
Start: 2025-06-10 | End: 2025-06-12 | Stop reason: HOSPADM

## 2025-06-10 RX ORDER — OXYCODONE HYDROCHLORIDE 10 MG/1
10 TABLET ORAL ONCE
Refills: 0 | Status: COMPLETED | OUTPATIENT
Start: 2025-06-10 | End: 2025-06-10

## 2025-06-10 RX ORDER — PANTOPRAZOLE SODIUM 40 MG/1
40 TABLET, DELAYED RELEASE ORAL
Status: DISCONTINUED | OUTPATIENT
Start: 2025-06-11 | End: 2025-06-12 | Stop reason: HOSPADM

## 2025-06-10 RX ORDER — POLYETHYLENE GLYCOL 3350 17 G/17G
17 POWDER, FOR SOLUTION ORAL DAILY
Status: DISCONTINUED | OUTPATIENT
Start: 2025-06-11 | End: 2025-06-11

## 2025-06-10 RX ORDER — OXYCODONE HYDROCHLORIDE 10 MG/1
10 TABLET ORAL EVERY 6 HOURS PRN
Refills: 0 | Status: DISCONTINUED | OUTPATIENT
Start: 2025-06-10 | End: 2025-06-12 | Stop reason: HOSPADM

## 2025-06-10 RX ORDER — OXYCODONE HYDROCHLORIDE 10 MG/1
10 TABLET ORAL EVERY 6 HOURS PRN
Qty: 25 TABLET | Refills: 0 | Status: SHIPPED | OUTPATIENT
Start: 2025-06-10

## 2025-06-10 RX ORDER — ENOXAPARIN SODIUM 100 MG/ML
40 INJECTION SUBCUTANEOUS EVERY 24 HOURS
Status: DISCONTINUED | OUTPATIENT
Start: 2025-06-10 | End: 2025-06-12 | Stop reason: HOSPADM

## 2025-06-10 RX ORDER — DIPHENHYDRAMINE HCL 25 MG
25 CAPSULE ORAL EVERY 6 HOURS PRN
Status: DISCONTINUED | OUTPATIENT
Start: 2025-06-10 | End: 2025-06-12 | Stop reason: HOSPADM

## 2025-06-10 RX ORDER — ONDANSETRON HYDROCHLORIDE 2 MG/ML
4 INJECTION, SOLUTION INTRAVENOUS ONCE
Status: COMPLETED | OUTPATIENT
Start: 2025-06-10 | End: 2025-06-10

## 2025-06-10 RX ORDER — KETOROLAC TROMETHAMINE 30 MG/ML
30 INJECTION, SOLUTION INTRAMUSCULAR; INTRAVENOUS EVERY 6 HOURS SCHEDULED
Status: COMPLETED | OUTPATIENT
Start: 2025-06-11 | End: 2025-06-11

## 2025-06-10 RX ORDER — AMOXICILLIN 250 MG
2 CAPSULE ORAL 2 TIMES DAILY
Status: DISCONTINUED | OUTPATIENT
Start: 2025-06-10 | End: 2025-06-11

## 2025-06-10 RX ADMIN — OXYCODONE HYDROCHLORIDE 10 MG: 10 TABLET ORAL at 20:27

## 2025-06-10 RX ADMIN — KETOROLAC TROMETHAMINE 30 MG: 30 INJECTION, SOLUTION INTRAMUSCULAR; INTRAVENOUS at 23:41

## 2025-06-10 RX ADMIN — HYDROMORPHONE HYDROCHLORIDE 0.5 MG: 1 INJECTION, SOLUTION INTRAMUSCULAR; INTRAVENOUS; SUBCUTANEOUS at 22:08

## 2025-06-10 RX ADMIN — ONDANSETRON 4 MG: 2 INJECTION INTRAMUSCULAR; INTRAVENOUS at 20:27

## 2025-06-10 ASSESSMENT — LIFESTYLE VARIABLES
HAVE PEOPLE ANNOYED YOU BY CRITICIZING YOUR DRINKING: NO
EVER FELT BAD OR GUILTY ABOUT YOUR DRINKING: NO
EVER HAD A DRINK FIRST THING IN THE MORNING TO STEADY YOUR NERVES TO GET RID OF A HANGOVER: NO
HAVE YOU EVER FELT YOU SHOULD CUT DOWN ON YOUR DRINKING: NO
TOTAL SCORE: 0

## 2025-06-10 ASSESSMENT — PAIN - FUNCTIONAL ASSESSMENT
PAIN_FUNCTIONAL_ASSESSMENT: 0-10
PAIN_FUNCTIONAL_ASSESSMENT: 0-10

## 2025-06-10 ASSESSMENT — PAIN SCALES - GENERAL
PAINLEVEL_OUTOF10: 5 - MODERATE PAIN
PAINLEVEL_OUTOF10: 8

## 2025-06-10 NOTE — TELEPHONE ENCOUNTER
Pt calling back, states he still is not feeling well, no fever, but does feel warm. Emesis x 2- no bright red blood or coffee ground appearance.   Pt advised if symptoms worsening/not managed he will need to go to ED.   Pt unsure if he will go.

## 2025-06-10 NOTE — TELEPHONE ENCOUNTER
Yakov called and questioned if he can be seen in ACC today.  Patient is having pain in all over back.  Lower and upper back.  Pain started about 24 hrs ago and he is currently out of pain medication.  No fever - but he feels warm. Current pain 3/10 - pain fluctuating and unable to sleep over night.  Took tylenol and naproxen with no relief.      Secure chat sent to team. ACC is currently full. Team already spoke with patient.   Dr. Nelson sent script for oxycodone.    RN called patient and he is aware.

## 2025-06-11 LAB
ALBUMIN SERPL BCP-MCNC: 4.4 G/DL (ref 3.4–5)
ALP SERPL-CCNC: 62 U/L (ref 33–120)
ALT SERPL W P-5'-P-CCNC: 32 U/L (ref 10–52)
ANION GAP SERPL CALC-SCNC: 11 MMOL/L (ref 10–20)
AST SERPL W P-5'-P-CCNC: 25 U/L (ref 9–39)
BASO STIPL BLD QL SMEAR: PRESENT
BASOPHILS # BLD AUTO: 0 X10*3/UL (ref 0–0.1)
BASOPHILS # BLD MANUAL: 0 X10*3/UL (ref 0–0.1)
BASOPHILS # BLD MANUAL: 0.06 X10*3/UL (ref 0–0.1)
BASOPHILS # BLD MANUAL: 0.1 X10*3/UL (ref 0–0.1)
BASOPHILS NFR BLD AUTO: 0 %
BASOPHILS NFR BLD MANUAL: 0 %
BASOPHILS NFR BLD MANUAL: 1.8 %
BASOPHILS NFR BLD MANUAL: 1.9 %
BILIRUB SERPL-MCNC: 1.1 MG/DL (ref 0–1.2)
BLASTS # BLD MANUAL: 0 X10*3/UL
BLASTS NFR BLD MANUAL: 0 %
BLOOD EXPIRATION DATE: NORMAL
BUN SERPL-MCNC: 6 MG/DL (ref 6–23)
CALCIUM SERPL-MCNC: 9.4 MG/DL (ref 8.6–10.6)
CHLORIDE SERPL-SCNC: 103 MMOL/L (ref 98–107)
CO2 SERPL-SCNC: 24 MMOL/L (ref 21–32)
CREAT SERPL-MCNC: 0.81 MG/DL (ref 0.5–1.3)
DISPENSE STATUS: NORMAL
EGFRCR SERPLBLD CKD-EPI 2021: >90 ML/MIN/1.73M*2
EOSINOPHIL # BLD AUTO: 0.01 X10*3/UL (ref 0–0.7)
EOSINOPHIL # BLD MANUAL: 0 X10*3/UL (ref 0–0.7)
EOSINOPHIL NFR BLD AUTO: 0.3 %
EOSINOPHIL NFR BLD MANUAL: 0 %
ERYTHROCYTE [DISTWIDTH] IN BLOOD BY AUTOMATED COUNT: 19.1 % (ref 11.5–14.5)
ERYTHROCYTE [DISTWIDTH] IN BLOOD BY AUTOMATED COUNT: 19.3 % (ref 11.5–14.5)
ERYTHROCYTE [DISTWIDTH] IN BLOOD BY AUTOMATED COUNT: ABNORMAL %
ERYTHROCYTE [DISTWIDTH] IN BLOOD BY AUTOMATED COUNT: ABNORMAL %
FLUAV RNA RESP QL NAA+PROBE: NOT DETECTED
FLUBV RNA RESP QL NAA+PROBE: NOT DETECTED
GLUCOSE SERPL-MCNC: 115 MG/DL (ref 74–99)
HCT VFR BLD AUTO: 10.6 % (ref 41–52)
HCT VFR BLD AUTO: 15.6 % (ref 41–52)
HCT VFR BLD AUTO: 18.7 % (ref 41–52)
HCT VFR BLD AUTO: 19.8 % (ref 41–52)
HGB BLD-MCNC: 4 G/DL (ref 13.5–17.5)
HGB BLD-MCNC: 5.7 G/DL (ref 13.5–17.5)
HGB BLD-MCNC: 6.9 G/DL (ref 13.5–17.5)
HGB BLD-MCNC: 7.2 G/DL (ref 13.5–17.5)
HGB RETIC QN: 39 PG (ref 28–38)
HGB RETIC QN: 44 PG (ref 28–38)
HOWELL-JOLLY BOD BLD QL SMEAR: PRESENT
IMM GRANULOCYTES # BLD AUTO: 0 X10*3/UL (ref 0–0.7)
IMM GRANULOCYTES # BLD AUTO: 0.01 X10*3/UL (ref 0–0.7)
IMM GRANULOCYTES # BLD AUTO: 0.01 X10*3/UL (ref 0–0.7)
IMM GRANULOCYTES # BLD AUTO: 0.02 X10*3/UL (ref 0–0.7)
IMM GRANULOCYTES NFR BLD AUTO: 0 % (ref 0–0.9)
IMM GRANULOCYTES NFR BLD AUTO: 0.3 % (ref 0–0.9)
IMM GRANULOCYTES NFR BLD AUTO: 0.3 % (ref 0–0.9)
IMM GRANULOCYTES NFR BLD AUTO: 0.4 % (ref 0–0.9)
IMMATURE RETIC FRACTION: 37.5 %
IMMATURE RETIC FRACTION: 37.9 %
LACTATE SERPL-SCNC: 0.8 MMOL/L (ref 0.4–2)
LDH SERPL L TO P-CCNC: 250 U/L (ref 84–246)
LYMPHOCYTES # BLD AUTO: 2.5 X10*3/UL (ref 1.2–4.8)
LYMPHOCYTES # BLD MANUAL: 1.93 X10*3/UL (ref 1.2–4.8)
LYMPHOCYTES # BLD MANUAL: 2.17 X10*3/UL (ref 1.2–4.8)
LYMPHOCYTES # BLD MANUAL: 3.14 X10*3/UL (ref 1.2–4.8)
LYMPHOCYTES NFR BLD AUTO: 68.5 %
LYMPHOCYTES NFR BLD MANUAL: 60.4 %
LYMPHOCYTES NFR BLD MANUAL: 64.3 %
LYMPHOCYTES NFR BLD MANUAL: 65.8 %
MCH RBC QN AUTO: 42.3 PG (ref 26–34)
MCH RBC QN AUTO: 44.2 PG (ref 26–34)
MCH RBC QN AUTO: 46 PG (ref 26–34)
MCH RBC QN AUTO: 47.6 PG (ref 26–34)
MCHC RBC AUTO-ENTMCNC: 34.8 G/DL (ref 32–36)
MCHC RBC AUTO-ENTMCNC: 36.5 G/DL (ref 32–36)
MCHC RBC AUTO-ENTMCNC: 37.7 G/DL (ref 32–36)
MCHC RBC AUTO-ENTMCNC: 38.5 G/DL (ref 32–36)
MCV RBC AUTO: 115 FL (ref 80–100)
MCV RBC AUTO: 122 FL (ref 80–100)
MCV RBC AUTO: 126 FL (ref 80–100)
MCV RBC AUTO: 126 FL (ref 80–100)
METAMYELOCYTES # BLD MANUAL: 0 X10*3/UL
METAMYELOCYTES NFR BLD MANUAL: 0 %
MONOCYTES # BLD AUTO: 0.19 X10*3/UL (ref 0.1–1)
MONOCYTES # BLD MANUAL: 0.1 X10*3/UL (ref 0.1–1)
MONOCYTES # BLD MANUAL: 0.11 X10*3/UL (ref 0.1–1)
MONOCYTES # BLD MANUAL: 0.2 X10*3/UL (ref 0.1–1)
MONOCYTES NFR BLD AUTO: 5.2 %
MONOCYTES NFR BLD MANUAL: 1.9 %
MONOCYTES NFR BLD MANUAL: 3.5 %
MONOCYTES NFR BLD MANUAL: 6.1 %
MYELOCYTES # BLD MANUAL: 0 X10*3/UL
MYELOCYTES NFR BLD MANUAL: 0 %
NEUTROPHILS # BLD AUTO: 0.94 X10*3/UL (ref 1.2–7.7)
NEUTROPHILS # BLD MANUAL: 0.84 X10*3/UL (ref 1.2–7.7)
NEUTROPHILS # BLD MANUAL: 0.97 X10*3/UL (ref 1.2–7.7)
NEUTROPHILS # BLD MANUAL: 1.81 X10*3/UL (ref 1.2–7.7)
NEUTROPHILS NFR BLD AUTO: 25.7 %
NEUTS BAND # BLD MANUAL: 0 X10*3/UL (ref 0–0.7)
NEUTS BAND NFR BLD MANUAL: 0 %
NEUTS SEG # BLD MANUAL: 0.84 X10*3/UL (ref 1.2–7)
NEUTS SEG # BLD MANUAL: 0.97 X10*3/UL (ref 1.2–7)
NEUTS SEG # BLD MANUAL: 1.81 X10*3/UL (ref 1.2–7)
NEUTS SEG NFR BLD MANUAL: 25.4 %
NEUTS SEG NFR BLD MANUAL: 32.2 %
NEUTS SEG NFR BLD MANUAL: 34.9 %
NRBC BLD MANUAL-RTO: 0 % (ref 0–0)
NRBC BLD-RTO: 23.9 /100 WBCS (ref 0–0)
NRBC BLD-RTO: 26.6 /100 WBCS (ref 0–0)
NRBC BLD-RTO: 48.3 /100 WBCS (ref 0–0)
NRBC BLD-RTO: 51.8 /100 WBCS (ref 0–0)
OVALOCYTES BLD QL SMEAR: NORMAL
PAPPENHEIMER BOD BLD QL SMEAR: PRESENT
PLASMA CELLS # BLD MANUAL: 0 X10*3/UL
PLASMA CELLS # BLD MANUAL: 0 X10*3/UL
PLASMA CELLS # BLD MANUAL: 0.05 X10*3/UL
PLASMA CELLS NFR BLD MANUAL: 0 %
PLASMA CELLS NFR BLD MANUAL: 0 %
PLASMA CELLS NFR BLD MANUAL: 0.9 %
PLATELET # BLD AUTO: 240 X10*3/UL (ref 150–450)
PLATELET # BLD AUTO: 268 X10*3/UL (ref 150–450)
PLATELET # BLD AUTO: 375 X10*3/UL (ref 150–450)
PLATELET # BLD AUTO: 378 X10*3/UL (ref 150–450)
POLYCHROMASIA BLD QL SMEAR: ABNORMAL
POLYCHROMASIA BLD QL SMEAR: ABNORMAL
POLYCHROMASIA BLD QL SMEAR: NORMAL
POLYCHROMASIA BLD QL SMEAR: NORMAL
POTASSIUM SERPL-SCNC: 3.4 MMOL/L (ref 3.5–5.3)
PRODUCT BLOOD TYPE: 5100
PRODUCT CODE: NORMAL
PROMYELOCYTES # BLD MANUAL: 0 X10*3/UL
PROMYELOCYTES NFR BLD MANUAL: 0 %
PROT SERPL-MCNC: 8.1 G/DL (ref 6.4–8.2)
RBC # BLD AUTO: 0.84 X10*6/UL (ref 4.5–5.9)
RBC # BLD AUTO: 1.24 X10*6/UL (ref 4.5–5.9)
RBC # BLD AUTO: 1.63 X10*6/UL (ref 4.5–5.9)
RBC # BLD AUTO: 1.63 X10*6/UL (ref 4.5–5.9)
RBC MORPH BLD: ABNORMAL
RBC MORPH BLD: ABNORMAL
RBC MORPH BLD: NORMAL
RBC MORPH BLD: NORMAL
RETICS #: 0.05 X10*6/UL (ref 0.02–0.12)
RETICS #: 0.07 X10*6/UL (ref 0.02–0.12)
RETICS/RBC NFR AUTO: 5.6 % (ref 0.5–2)
RETICS/RBC NFR AUTO: 5.6 % (ref 0.5–2)
SARS-COV-2 RNA RESP QL NAA+PROBE: NOT DETECTED
SICKLE CELLS BLD QL SMEAR: NORMAL
SODIUM SERPL-SCNC: 135 MMOL/L (ref 136–145)
TARGETS BLD QL SMEAR: ABNORMAL
TARGETS BLD QL SMEAR: ABNORMAL
TARGETS BLD QL SMEAR: NORMAL
TARGETS BLD QL SMEAR: NORMAL
TOTAL CELLS COUNTED BLD: 106
TOTAL CELLS COUNTED BLD: 114
TOTAL CELLS COUNTED BLD: 115
UNIT ABO: NORMAL
UNIT NUMBER: NORMAL
UNIT RH: NORMAL
UNIT VOLUME: 285
VARIANT LYMPHS # BLD MANUAL: 0 X10*3/UL (ref 0–0.5)
VARIANT LYMPHS # BLD MANUAL: 0 X10*3/UL (ref 0–0.5)
VARIANT LYMPHS # BLD MANUAL: 0.03 X10*3/UL (ref 0–0.5)
VARIANT LYMPHS NFR BLD: 0 %
VARIANT LYMPHS NFR BLD: 0 %
VARIANT LYMPHS NFR BLD: 0.9 %
WBC # BLD AUTO: 3 X10*3/UL (ref 4.4–11.3)
WBC # BLD AUTO: 3.3 X10*3/UL (ref 4.4–11.3)
WBC # BLD AUTO: 3.7 X10*3/UL (ref 4.4–11.3)
WBC # BLD AUTO: 5.2 X10*3/UL (ref 4.4–11.3)
XM INTEP: NORMAL

## 2025-06-11 PROCEDURE — 36415 COLL VENOUS BLD VENIPUNCTURE: CPT

## 2025-06-11 PROCEDURE — 83615 LACTATE (LD) (LDH) ENZYME: CPT

## 2025-06-11 PROCEDURE — G0378 HOSPITAL OBSERVATION PER HR: HCPCS

## 2025-06-11 PROCEDURE — 86902 BLOOD TYPE ANTIGEN DONOR EA: CPT

## 2025-06-11 PROCEDURE — 85007 BL SMEAR W/DIFF WBC COUNT: CPT

## 2025-06-11 PROCEDURE — 99232 SBSQ HOSP IP/OBS MODERATE 35: CPT | Performed by: HOME HEALTH AIDE

## 2025-06-11 PROCEDURE — 96376 TX/PRO/DX INJ SAME DRUG ADON: CPT

## 2025-06-11 PROCEDURE — 85007 BL SMEAR W/DIFF WBC COUNT: CPT | Performed by: HOME HEALTH AIDE

## 2025-06-11 PROCEDURE — 87636 SARSCOV2 & INF A&B AMP PRB: CPT

## 2025-06-11 PROCEDURE — P9040 RBC LEUKOREDUCED IRRADIATED: HCPCS

## 2025-06-11 PROCEDURE — 80053 COMPREHEN METABOLIC PANEL: CPT

## 2025-06-11 PROCEDURE — 83605 ASSAY OF LACTIC ACID: CPT | Performed by: HOME HEALTH AIDE

## 2025-06-11 PROCEDURE — 2500000004 HC RX 250 GENERAL PHARMACY W/ HCPCS (ALT 636 FOR OP/ED): Mod: SE

## 2025-06-11 PROCEDURE — 85045 AUTOMATED RETICULOCYTE COUNT: CPT

## 2025-06-11 PROCEDURE — 85027 COMPLETE CBC AUTOMATED: CPT

## 2025-06-11 PROCEDURE — 85027 COMPLETE CBC AUTOMATED: CPT | Performed by: HOME HEALTH AIDE

## 2025-06-11 PROCEDURE — 99223 1ST HOSP IP/OBS HIGH 75: CPT

## 2025-06-11 PROCEDURE — 2500000001 HC RX 250 WO HCPCS SELF ADMINISTERED DRUGS (ALT 637 FOR MEDICARE OP): Mod: SE

## 2025-06-11 PROCEDURE — 36430 TRANSFUSION BLD/BLD COMPNT: CPT

## 2025-06-11 RX ORDER — AMOXICILLIN 250 MG/1
250 CAPSULE ORAL EVERY 12 HOURS
Status: DISCONTINUED | OUTPATIENT
Start: 2025-06-11 | End: 2025-06-12 | Stop reason: HOSPADM

## 2025-06-11 RX ORDER — FOLIC ACID 1 MG/1
1 TABLET ORAL DAILY
Status: DISCONTINUED | OUTPATIENT
Start: 2025-06-11 | End: 2025-06-12 | Stop reason: HOSPADM

## 2025-06-11 RX ORDER — HYDROXYUREA 500 MG/1
2000 CAPSULE ORAL DAILY
Status: DISCONTINUED | OUTPATIENT
Start: 2025-06-11 | End: 2025-06-12 | Stop reason: HOSPADM

## 2025-06-11 RX ORDER — POLYETHYLENE GLYCOL 3350 17 G/17G
17 POWDER, FOR SOLUTION ORAL DAILY PRN
Status: DISCONTINUED | OUTPATIENT
Start: 2025-06-11 | End: 2025-06-12 | Stop reason: HOSPADM

## 2025-06-11 RX ORDER — AMOXICILLIN 250 MG
2 CAPSULE ORAL NIGHTLY PRN
Status: DISCONTINUED | OUTPATIENT
Start: 2025-06-11 | End: 2025-06-12 | Stop reason: HOSPADM

## 2025-06-11 RX ORDER — AMOXICILLIN 250 MG/1
250 CAPSULE ORAL EVERY 12 HOURS
Status: DISCONTINUED | OUTPATIENT
Start: 2025-06-11 | End: 2025-06-11

## 2025-06-11 RX ORDER — ALBUTEROL SULFATE 90 UG/1
2 INHALANT RESPIRATORY (INHALATION) EVERY 6 HOURS PRN
Status: DISCONTINUED | OUTPATIENT
Start: 2025-06-11 | End: 2025-06-12 | Stop reason: HOSPADM

## 2025-06-11 RX ORDER — FLUTICASONE FUROATE AND VILANTEROL 200; 25 UG/1; UG/1
1 POWDER RESPIRATORY (INHALATION)
Status: DISCONTINUED | OUTPATIENT
Start: 2025-06-11 | End: 2025-06-12 | Stop reason: HOSPADM

## 2025-06-11 RX ADMIN — PANTOPRAZOLE SODIUM 40 MG: 40 TABLET, DELAYED RELEASE ORAL at 08:01

## 2025-06-11 RX ADMIN — KETOROLAC TROMETHAMINE 30 MG: 30 INJECTION, SOLUTION INTRAMUSCULAR; INTRAVENOUS at 05:50

## 2025-06-11 RX ADMIN — HYDROXYUREA 2000 MG: 500 CAPSULE ORAL at 08:01

## 2025-06-11 RX ADMIN — AMOXICILLIN 250 MG: 250 CAPSULE ORAL at 08:01

## 2025-06-11 RX ADMIN — OXYCODONE HYDROCHLORIDE 10 MG: 10 TABLET ORAL at 15:53

## 2025-06-11 RX ADMIN — KETOROLAC TROMETHAMINE 30 MG: 30 INJECTION, SOLUTION INTRAMUSCULAR; INTRAVENOUS at 12:35

## 2025-06-11 RX ADMIN — AMOXICILLIN 250 MG: 250 CAPSULE ORAL at 20:39

## 2025-06-11 RX ADMIN — FOLIC ACID 1 MG: 1 TABLET ORAL at 08:01

## 2025-06-11 SDOH — SOCIAL STABILITY: SOCIAL INSECURITY
WITHIN THE LAST YEAR, HAVE YOU BEEN RAPED OR FORCED TO HAVE ANY KIND OF SEXUAL ACTIVITY BY YOUR PARTNER OR EX-PARTNER?: NO

## 2025-06-11 SDOH — ECONOMIC STABILITY: FOOD INSECURITY: WITHIN THE PAST 12 MONTHS, THE FOOD YOU BOUGHT JUST DIDN'T LAST AND YOU DIDN'T HAVE MONEY TO GET MORE.: SOMETIMES TRUE

## 2025-06-11 SDOH — ECONOMIC STABILITY: INCOME INSECURITY: IN THE PAST 12 MONTHS HAS THE ELECTRIC, GAS, OIL, OR WATER COMPANY THREATENED TO SHUT OFF SERVICES IN YOUR HOME?: YES

## 2025-06-11 SDOH — HEALTH STABILITY: MENTAL HEALTH: HOW MANY DRINKS CONTAINING ALCOHOL DO YOU HAVE ON A TYPICAL DAY WHEN YOU ARE DRINKING?: PATIENT DOES NOT DRINK

## 2025-06-11 SDOH — SOCIAL STABILITY: SOCIAL INSECURITY
WITHIN THE LAST YEAR, HAVE YOU BEEN KICKED, HIT, SLAPPED, OR OTHERWISE PHYSICALLY HURT BY YOUR PARTNER OR EX-PARTNER?: NO

## 2025-06-11 SDOH — HEALTH STABILITY: MENTAL HEALTH: HOW OFTEN DO YOU HAVE SIX OR MORE DRINKS ON ONE OCCASION?: NEVER

## 2025-06-11 SDOH — ECONOMIC STABILITY: FOOD INSECURITY
WITHIN THE PAST 12 MONTHS, YOU WORRIED THAT YOUR FOOD WOULD RUN OUT BEFORE YOU GOT THE MONEY TO BUY MORE.: SOMETIMES TRUE

## 2025-06-11 SDOH — SOCIAL STABILITY: SOCIAL INSECURITY: ABUSE: ADULT

## 2025-06-11 SDOH — SOCIAL STABILITY: SOCIAL INSECURITY: WITHIN THE LAST YEAR, HAVE YOU BEEN HUMILIATED OR EMOTIONALLY ABUSED IN OTHER WAYS BY YOUR PARTNER OR EX-PARTNER?: NO

## 2025-06-11 SDOH — HEALTH STABILITY: MENTAL HEALTH: HOW OFTEN DO YOU HAVE A DRINK CONTAINING ALCOHOL?: NEVER

## 2025-06-11 SDOH — SOCIAL STABILITY: SOCIAL INSECURITY: DO YOU FEEL ANYONE HAS EXPLOITED OR TAKEN ADVANTAGE OF YOU FINANCIALLY OR OF YOUR PERSONAL PROPERTY?: NO

## 2025-06-11 SDOH — SOCIAL STABILITY: SOCIAL INSECURITY: DOES ANYONE TRY TO KEEP YOU FROM HAVING/CONTACTING OTHER FRIENDS OR DOING THINGS OUTSIDE YOUR HOME?: NO

## 2025-06-11 SDOH — SOCIAL STABILITY: SOCIAL INSECURITY: HAVE YOU HAD THOUGHTS OF HARMING ANYONE ELSE?: NO

## 2025-06-11 SDOH — SOCIAL STABILITY: SOCIAL INSECURITY: WITHIN THE LAST YEAR, HAVE YOU BEEN AFRAID OF YOUR PARTNER OR EX-PARTNER?: NO

## 2025-06-11 SDOH — SOCIAL STABILITY: SOCIAL INSECURITY: DO YOU FEEL UNSAFE GOING BACK TO THE PLACE WHERE YOU ARE LIVING?: NO

## 2025-06-11 SDOH — SOCIAL STABILITY: SOCIAL INSECURITY: HAVE YOU HAD ANY THOUGHTS OF HARMING ANYONE ELSE?: NO

## 2025-06-11 SDOH — SOCIAL STABILITY: SOCIAL INSECURITY: WERE YOU ABLE TO COMPLETE ALL THE BEHAVIORAL HEALTH SCREENINGS?: YES

## 2025-06-11 SDOH — SOCIAL STABILITY: SOCIAL INSECURITY: HAS ANYONE EVER THREATENED TO HURT YOUR FAMILY OR YOUR PETS?: NO

## 2025-06-11 SDOH — SOCIAL STABILITY: SOCIAL INSECURITY: ARE THERE ANY APPARENT SIGNS OF INJURIES/BEHAVIORS THAT COULD BE RELATED TO ABUSE/NEGLECT?: NO

## 2025-06-11 SDOH — SOCIAL STABILITY: SOCIAL INSECURITY: ARE YOU OR HAVE YOU BEEN THREATENED OR ABUSED PHYSICALLY, EMOTIONALLY, OR SEXUALLY BY ANYONE?: NO

## 2025-06-11 ASSESSMENT — PAIN SCALES - GENERAL
PAINLEVEL_OUTOF10: 3
PAINLEVEL_OUTOF10: 4
PAINLEVEL_OUTOF10: 1
PAINLEVEL_OUTOF10: 3
PAINLEVEL_OUTOF10: 5 - MODERATE PAIN
PAINLEVEL_OUTOF10: 2
PAINLEVEL_OUTOF10: 3
PAINLEVEL_OUTOF10: 2
PAINLEVEL_OUTOF10: 0 - NO PAIN

## 2025-06-11 ASSESSMENT — COGNITIVE AND FUNCTIONAL STATUS - GENERAL
MOBILITY SCORE: 24
MOBILITY SCORE: 24
PATIENT BASELINE BEDBOUND: NO
DAILY ACTIVITIY SCORE: 24
MOBILITY SCORE: 24
DAILY ACTIVITIY SCORE: 24

## 2025-06-11 ASSESSMENT — ACTIVITIES OF DAILY LIVING (ADL)
LACK_OF_TRANSPORTATION: NO
BATHING: INDEPENDENT
JUDGMENT_ADEQUATE_SAFELY_COMPLETE_DAILY_ACTIVITIES: YES
HEARING - LEFT EAR: FUNCTIONAL
PATIENT'S MEMORY ADEQUATE TO SAFELY COMPLETE DAILY ACTIVITIES?: YES
DRESSING YOURSELF: INDEPENDENT
HEARING - RIGHT EAR: FUNCTIONAL
ADEQUATE_TO_COMPLETE_ADL: YES
ASSISTIVE_DEVICE: EYEGLASSES
WALKS IN HOME: INDEPENDENT
GROOMING: INDEPENDENT
FEEDING YOURSELF: INDEPENDENT
TOILETING: INDEPENDENT

## 2025-06-11 ASSESSMENT — ENCOUNTER SYMPTOMS
SHORTNESS OF BREATH: 0
ABDOMINAL PAIN: 1
RHINORRHEA: 0
ARTHRALGIAS: 1
ALLERGIC/IMMUNOLOGIC NEGATIVE: 1
NAUSEA: 1
TROUBLE SWALLOWING: 0
CONSTIPATION: 0
CHILLS: 1
NUMBNESS: 0
LIGHT-HEADEDNESS: 0
EYES NEGATIVE: 1
ENDOCRINE NEGATIVE: 1
PALPITATIONS: 0
RESPIRATORY NEGATIVE: 1
DIZZINESS: 0
HEMATOLOGIC/LYMPHATIC NEGATIVE: 1
WHEEZING: 0
DIARRHEA: 1
BACK PAIN: 1
CHEST TIGHTNESS: 0
APPETITE CHANGE: 0
COUGH: 0
PSYCHIATRIC NEGATIVE: 1
FEVER: 0
SORE THROAT: 0
WEAKNESS: 0
HEADACHES: 1
VOMITING: 1
CARDIOVASCULAR NEGATIVE: 1
FATIGUE: 0

## 2025-06-11 ASSESSMENT — PATIENT HEALTH QUESTIONNAIRE - PHQ9
1. LITTLE INTEREST OR PLEASURE IN DOING THINGS: NOT AT ALL
SUM OF ALL RESPONSES TO PHQ9 QUESTIONS 1 & 2: 0
2. FEELING DOWN, DEPRESSED OR HOPELESS: NOT AT ALL

## 2025-06-11 ASSESSMENT — PAIN - FUNCTIONAL ASSESSMENT
PAIN_FUNCTIONAL_ASSESSMENT: 0-10
PAIN_FUNCTIONAL_ASSESSMENT: UNABLE TO SELF-REPORT
PAIN_FUNCTIONAL_ASSESSMENT: 0-10

## 2025-06-11 ASSESSMENT — PAIN DESCRIPTION - ORIENTATION
ORIENTATION: LOWER
ORIENTATION: LOWER

## 2025-06-11 ASSESSMENT — LIFESTYLE VARIABLES
AUDIT-C TOTAL SCORE: 0
SKIP TO QUESTIONS 9-10: 1

## 2025-06-11 ASSESSMENT — PAIN DESCRIPTION - LOCATION
LOCATION: BACK
LOCATION: BACK

## 2025-06-11 ASSESSMENT — PAIN DESCRIPTION - FREQUENCY
FREQUENCY: CONSTANT/CONTINUOUS
FREQUENCY: CONSTANT/CONTINUOUS

## 2025-06-11 ASSESSMENT — PAIN DESCRIPTION - PAIN TYPE
TYPE: CHRONIC PAIN;ACUTE PAIN
TYPE: ACUTE PAIN;CHRONIC PAIN

## 2025-06-11 ASSESSMENT — PAIN DESCRIPTION - PROGRESSION
CLINICAL_PROGRESSION: GRADUALLY IMPROVING
CLINICAL_PROGRESSION: GRADUALLY WORSENING

## 2025-06-11 ASSESSMENT — PAIN DESCRIPTION - ONSET
ONSET: ONGOING
ONSET: ONGOING

## 2025-06-11 NOTE — ED PROVIDER NOTES
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  Patient states that he ran out of his oxycodone this week because he has been having sickle cell pain all week.  Endorses fatigue and intermittent dizziness, feels like his hemoglobin is going to be low.  States that he takes his hydroxyurea daily as well as his vitamins. Reports his pain has not been this bad in years, was last admitted in December of 2023. Baseline hemoglobin 8.5 per patient. Denies SOB, cough, dyspnea, chest pain.       Physical Exam   Physical Exam  Constitutional:       General: He is not in acute distress.     Appearance: Normal appearance. He is not ill-appearing.      Comments: Thin, appears pale    HENT:      Head: Normocephalic and atraumatic.      Nose: Nose normal.      Mouth/Throat:      Mouth: Mucous membranes are moist.   Eyes:      Extraocular Movements: Extraocular movements intact.      Pupils: Pupils are equal, round, and reactive to light.   Cardiovascular:      Rate and Rhythm: Normal rate and regular rhythm.      Pulses: Normal pulses.      Heart sounds: Normal heart sounds.   Pulmonary:      Effort: Pulmonary effort is normal.      Breath sounds: Normal breath sounds.   Abdominal:      Palpations: Abdomen is soft.      Tenderness: There is abdominal tenderness. There is no guarding or rebound.   Musculoskeletal:         General: Tenderness (Midline low back pain, consistent with SCD pain) present. Normal range of motion.      Cervical back: Normal range of motion and neck supple.   Skin:     General: Skin is warm.   Neurological:      General: No focal  deficit present.      Mental Status: He is alert.          Triage vitals:  T 36.4 °C (97.5 °F)  HR 89  /65  RR 16  O2 99 %          Medical Decision Making & ED Course     ED Course & [facility]     Medical Decision Making  Vital signs reviewed, afebrile, normotensive, not tachycardic.  Satting well on room air and speaking full sentences in no acute distress.  History obtained from patient and chart.  Patient endorsing sickle cell pain mainly in his low back as well as nausea and vomiting that started today.  Patient states that he has felt increasingly fatigued and is concerned that his hemoglobin is below his baseline.  On exam patient is thin appearing but in no acute distress, abdomen soft with mild tenderness diffusely.  Active bowel sounds.  Patient denies history of abdominal surgeries, low concern for SBO, appendicitis, cholecystitis, or other acute process. No concern for acute chest.  Patient states that he would like to try home oxycodone dose of 10 mg for pain and reassess before giving more meds.  Zofran given for nausea.  CBC with leukopenia of 4.3, RBC at 1.38, hemoglobin 6.5, hematocrit 17.5.  Most recent lab draw 5 months ago showed a hemoglobin of 8.9.  Retic % at 8.0. CXR negative for acute cardiopulmonary process.   Will admit to hematology service for possible transfusion and pain control. Patient agreeable to plan.   Bed slip placed.     Patient was discussed and staffed with  Dr Price     ----    Visit Vitals  /65   Pulse 89   Temp 36.4 °C (97.5 °F)   Resp 16   Ht 1.829 m (6')   Wt 79.4 kg (175 lb)   SpO2 99%   BMI 23.73 kg/m²   Smoking Status Never   BSA 2.01 m²        Labs Reviewed   CBC WITH AUTO DIFFERENTIAL - Abnormal       Result Value    WBC 4.3 (*)     nRBC 29.6 (*)     RBC 1.38 (*)     Hemoglobin 6.5 (*)     Hematocrit 17.5 (*)      (*)     MCH 47.1 (*)     MCHC 37.1 (*)     RDW 22.5 (*)     Platelets 328      Neutrophils % 35.6      Immature Granulocytes %,  Automated 0.2      Lymphocytes % 59.3      Monocytes % 4.7      Eosinophils % 0.0      Basophils % 0.2      Neutrophils Absolute 1.51      Immature Granulocytes Absolute, Automated 0.01      Lymphocytes Absolute 2.52      Monocytes Absolute 0.20      Eosinophils Absolute 0.00      Basophils Absolute 0.01     RETICULOCYTES - Abnormal    Retic % 8.0 (*)     Retic Absolute 0.110      Reticulocyte Hemoglobin 32      Immature Retic fraction 26.9 (*)    TYPE AND SCREEN   LACTATE DEHYDROGENASE   COMPREHENSIVE METABOLIC PANEL   MORPHOLOGY    RBC Morphology See Below      Polychromasia Mild      Target Cells Few      Stomatocytes Few      Bill-Jolly Bodies Present      Pappenheimer Bodies Present     PATH REVIEW-CBC DIFFERENTIAL       XR chest 2 views             ED Course:  ED Course as of 06/10/25 2226   Tue Michael 10, 2025   2128 HEMOGLOBIN(!!): 6.5 [HB]   2137 Baseline Hgb 8 per heme note in March 2025  [HB]      ED Course User Index  [HB] Preeti Rosario PA-C         Diagnoses as of 06/10/25 2226   Sickle cell crisis (Multi)     Disposition   Admit to hematology for sickle cell crisis     Procedures   Procedures    COMMENT: Please note this report has been produced using speech recognition software and may contain errors related that system including errors in grammar, punctuation, spelling as well as words and phrases that may be inappropriate.  If there are any questions or concerns please feel free to contact the dictating provider for clarification.     Preeti Rosario PA-C  Emergency Medicine      Preeti Rosario PA-C  06/10/25 3451     Eosinophils is no longer being reported.  The previously reported   component Absolute Basophils is no longer being reported.   CBC WITH AUTO DIFFERENTIAL - Abnormal    WBC 3.7 (*)     nRBC 26.6 (*)     RBC 0.84 (*)     Hemoglobin 4.0 (*)     Hematocrit 10.6 (*)      (*)     MCH 47.6 (*)     MCHC 37.7 (*)     RDW 19.1 (*)     Platelets 240      Neutrophils % 25.7      Immature Granulocytes %, Automated 0.3      Lymphocytes % 68.5      Monocytes % 5.2      Eosinophils % 0.3      Basophils % 0.0      Neutrophils Absolute 0.94 (*)     Immature Granulocytes Absolute, Automated 0.01      Lymphocytes Absolute 2.50      Monocytes Absolute 0.19      Eosinophils Absolute 0.01      Basophils Absolute 0.00     COMPREHENSIVE METABOLIC PANEL - Abnormal    Glucose 115 (*)     Sodium 135 (*)     Potassium 3.4 (*)     Chloride 103      Bicarbonate 24      Anion Gap 11      Urea Nitrogen 6      Creatinine 0.81      eGFR >90      Calcium 9.4      Albumin 4.4      Alkaline Phosphatase 62      Total Protein 8.1      AST 25      Bilirubin, Total 1.1      ALT 32     LACTATE DEHYDROGENASE - Abnormal     (*)    RETICULOCYTES - Abnormal    Retic % 5.6 (*)     Retic Absolute 0.070      Reticulocyte Hemoglobin 44 (*)     Immature Retic fraction 37.5 (*)    RETICULOCYTES - Abnormal    Retic % 5.6 (*)     Retic Absolute 0.047      Reticulocyte Hemoglobin 39 (*)     Immature Retic fraction 37.9 (*)    HEMOGLOBIN IDENTIFICATION WITH PATH REVIEW - Abnormal    Hemoglobin F 22.1 (*)     Hemoglobin S 73.7 (*)     Hemoglobin A2 4.2 (*)     Hemoglobin Identification Interpretation See Below (*)     Pathologist Review-Hemoglobin Identification        Value: Electronically signed out by Efra Coffman MD on 6/13/25 at 12:27 PM.  By the signature on this report, the individual or group listed as making the Final Interpretation/Diagnosis certifies that they have reviewed this case.   CBC WITH AUTO DIFFERENTIAL - Abnormal    WBC 3.3 (*)      nRBC 48.3 (*)     RBC 1.63 (*)     Hemoglobin 7.2 (*)     Hematocrit 18.7 (*)      (*)     MCH 44.2 (*)     MCHC 38.5 (*)     RDW        Platelets 375      Immature Granulocytes %, Automated 0.0      Immature Granulocytes Absolute, Automated 0.00      Narrative:     The previously reported component Neutrophils % is no longer being reported.  The previously reported component Lymphocytes % is no longer being reported.  The previously reported component Monocytes % is no longer being reported.  The previously   reported component Eosinophils % is no longer being reported.  The previously reported component Basophils % is no longer being reported.  The previously reported component Absolute Neutrophils is no longer being reported.  The previously reported   component Absolute Lymphocytes is no longer being reported.  The previously reported component Absolute Monocytes is no longer being reported.  The previously reported component Absolute Eosinophils is no longer being reported.  The previously reported   component Absolute Basophils is no longer being reported.   CBC WITH AUTO DIFFERENTIAL - Abnormal    WBC 3.0 (*)     nRBC 51.8 (*)     RBC 1.63 (*)     Hemoglobin 6.9 (*)     Hematocrit 19.8 (*)      (*)     MCH 42.3 (*)     MCHC 34.8      RDW        Platelets 378      Immature Granulocytes %, Automated 0.3      Immature Granulocytes Absolute, Automated 0.01      Narrative:     The previously reported component Neutrophils % is no longer being reported.  The previously reported component Lymphocytes % is no longer being reported.  The previously reported component Monocytes % is no longer being reported.  The previously   reported component Eosinophils % is no longer being reported.  The previously reported component Basophils % is no longer being reported.  The previously reported component Absolute Neutrophils is no longer being reported.  The previously reported   component Absolute Lymphocytes is  no longer being reported.  The previously reported component Absolute Monocytes is no longer being reported.  The previously reported component Absolute Eosinophils is no longer being reported.  The previously reported   component Absolute Basophils is no longer being reported.   CBC WITH AUTO DIFFERENTIAL - Abnormal    WBC 5.2      nRBC 48.6 (*)     RBC 1.71 (*)     Hemoglobin 7.4 (*)     Hematocrit 20.6 (*)      (*)     MCH 43.3 (*)     MCHC 35.9      RDW 27.3 (*)     Platelets 408      Neutrophils % 28.5      Immature Granulocytes %, Automated 0.2      Lymphocytes % 65.7      Monocytes % 4.6      Eosinophils % 0.8      Basophils % 0.2      Neutrophils Absolute 1.48      Immature Granulocytes Absolute, Automated 0.01      Lymphocytes Absolute 3.41      Monocytes Absolute 0.24      Eosinophils Absolute 0.04      Basophils Absolute 0.01     COMPREHENSIVE METABOLIC PANEL - Abnormal    Glucose 93      Sodium 140      Potassium 3.6      Chloride 106      Bicarbonate 29      Anion Gap 9 (*)     Urea Nitrogen 7      Creatinine 0.77      eGFR >90      Calcium 9.1      Albumin 4.4      Alkaline Phosphatase 73      Total Protein 8.1      AST 24      Bilirubin, Total 1.1      ALT 28     LACTATE DEHYDROGENASE - Abnormal     (*)    RETICULOCYTES - Abnormal    Retic % 5.8 (*)     Retic Absolute 0.099      Reticulocyte Hemoglobin 40 (*)     Immature Retic fraction 35.5 (*)    MANUAL DIFFERENTIAL - Abnormal    Neutrophils %, Manual 25.4      Bands %, Manual 0.0      Lymphocytes %, Manual 65.8      Monocytes %, Manual 6.1      Eosinophils %, Manual 0.0      Basophils %, Manual 1.8      Atypical Lymphocytes %, Manual 0.9      Metamyelocytes %, Manual 0.0      Myelocytes %, Manual 0.0      Plasma Cells %, Manual 0.0      Promyelocytes %, Manual 0.0      Blasts %, Manual 0.0      Seg Neutrophils Absolute, Manual 0.84 (*)     Bands Absolute, Manual 0.00      Lymphocytes Absolute, Manual 2.17      Monocytes Absolute,  Manual 0.20      Eosinophils Absolute, Manual 0.00      Basophils Absolute, Manual 0.06      Atypical Lymphs Absolute, Manual 0.03      Metamyelocytes Absolute, Manual 0.00      Myelocytes Absolute, Manual 0.00      Plasma Cells Absolute, Manual 0.00      Promyelocytes Absolute, Manual 0.00      Blasts Absolute, Manual 0.00      Total Cells Counted 114      Neutrophils Absolute, Manual 0.84 (*)     Manual nRBC per 100 Cells 0.0      RBC Morphology See Below      Polychromasia Mild      Target Cells Few      Pappenheimer Bodies Present     MANUAL DIFFERENTIAL - Abnormal    Neutrophils %, Manual 32.2      Bands %, Manual 0.0      Lymphocytes %, Manual 64.3      Monocytes %, Manual 3.5      Eosinophils %, Manual 0.0      Basophils %, Manual 0.0      Atypical Lymphocytes %, Manual 0.0      Metamyelocytes %, Manual 0.0      Myelocytes %, Manual 0.0      Plasma Cells %, Manual 0.0      Promyelocytes %, Manual 0.0      Blasts %, Manual 0.0      Seg Neutrophils Absolute, Manual 0.97 (*)     Bands Absolute, Manual 0.00      Lymphocytes Absolute, Manual 1.93      Monocytes Absolute, Manual 0.11      Eosinophils Absolute, Manual 0.00      Basophils Absolute, Manual 0.00      Atypical Lymphs Absolute, Manual 0.00      Metamyelocytes Absolute, Manual 0.00      Myelocytes Absolute, Manual 0.00      Plasma Cells Absolute, Manual 0.00      Promyelocytes Absolute, Manual 0.00      Blasts Absolute, Manual 0.00      Total Cells Counted 115      Neutrophils Absolute, Manual 0.97 (*)     Manual nRBC per 100 Cells 0.0      RBC Morphology See Below      Polychromasia Mild      Target Cells Few      Bill-[place] Bodies Present      Pappenheimer Bodies Present     SARS-COV-2 AND INFLUENZA A/B PCR - Normal    Flu A Result Not Detected      Flu B Result Not Detected      Coronavirus 2019, PCR Not Detected      Narrative:     This assay is an FDA-cleared, in vitro diagnostic nucleic acid amplification test for the qualitative detection and  differentiation of SARS CoV-2/ Influenza A/B from nasopharyngeal specimens collected from individuals with signs and symptoms of respiratory tract infections, and has been validated for use at [facility]. Negative results do not preclude COVID-19/ Influenza A/B infections and should not be used as the sole basis for diagnosis, treatment, or other management decisions. Testing for SARS CoV-2 is recommended only for patients who meet current clinical and/or epidemiological criteria defined by federal, state, or local public health directives.   LACTATE - Normal    Lactate 0.8      Narrative:     Venipuncture immediately after or during the administration of Metamizole may lead to falsely low results. Testing should be performed immediately prior to Metamizole dosing.   VITAMIN B12 - Normal    Vitamin B12 344     FOLATE - Normal    Folate, Serum 14.1      Narrative:     Low           <3.4  Borderline 3.4-5.0  Normal        >5.0    Patients receiving more than 5 mg/day of biotin may have interference in test results. A sample should be taken no sooner than eight hours after previous dose. Contact the testing laboratory for additional information.    TYPE AND SCREEN    ABO TYPE O      Rh TYPE POS      ANTIBODY SCREEN NEG     PREPARE RBC    PRODUCT CODE [id number]      Unit Number [id number]      Unit ABO O      Unit RH POS      XM INTEP COMP      Dispense Status TR      Blood Expiration Date 6/25/2025 11:59:00 PM EDT      PRODUCT BLOOD TYPE 5100      UNIT VOLUME 285     PATH REVIEW-CBC DIFFERENTIAL    Pathologist Review-CBC Differential        Value: Severe macrocytic anemia with sickle cells and schistocytes, Bill-Jolly bodies and polycromasia; consistent with history of sicle cell disease and hydroxyurea therapy.    Frequent neutropenia is of uncertain significance. Potential etiologies include infection, inflammatory or autoimmune processes drug/toxin (possibly hydroxyurea) effect, or  nutritional problems. Note that some individuals, typically of  descent with Reyes-neg red cell phenotype, may have absolute neutrophil counts down to about 1 k/uL in the absence of disease.    Clinical correlation is advised.       MORPHOLOGY    RBC Morphology See Below      Polychromasia Mild      Target Cells Few      Stomatocytes Few      Bill-Jolly Bodies Present      Pappenheimer Bodies Present     MANUAL DIFFERENTIAL    Neutrophils %, Manual 34.9      Bands %, Manual 0.0      Lymphocytes %, Manual 60.4      Monocytes %, Manual 1.9      Eosinophils %, Manual 0.0      Basophils %, Manual 1.9      Atypical Lymphocytes %, Manual 0.0      Metamyelocytes %, Manual 0.0      Myelocytes %, Manual 0.0      Plasma Cells %, Manual 0.9      Promyelocytes %, Manual 0.0      Blasts %, Manual 0.0      Seg Neutrophils Absolute, Manual 1.81      Bands Absolute, Manual 0.00      Lymphocytes Absolute, Manual 3.14      Monocytes Absolute, Manual 0.10      Eosinophils Absolute, Manual 0.00      Basophils Absolute, Manual 0.10      Atypical Lymphs Absolute, Manual 0.00      Metamyelocytes Absolute, Manual 0.00      Myelocytes Absolute, Manual 0.00      Plasma Cells Absolute, Manual 0.05      Promyelocytes Absolute, Manual 0.00      Blasts Absolute, Manual 0.00      Total Cells Counted 106      Neutrophils Absolute, Manual 1.81      Manual nRBC per 100 Cells 0.0      RBC Morphology See Below      Polychromasia Marked      Sickle Cells Few      Target Cells Many      Ovalocytes Few      Basophilic Stippling Present     MORPHOLOGY    RBC Morphology See Below      Polychromasia Mild      Target Cells Few      Pappenheimer Bodies Present         XR chest 2 views   Final Result   1. No localizing infiltrate. Stigmata of sickle cell disease        I personally reviewed the image(s)/study and resident interpretation.   I agree with the findings as stated by resident Obinna Sterling.   Data analyzed and images interpreted at  [facility], [place], [place].        MACRO:   None        Signed by: Clyde Gibson 6/10/2025 11:56 PM   Dictation workstation:   [id number]          ED Course:  ED Course as of 06/18/25 1621   Tue Michael 10, 2025   2128 HEMOGLOBIN(!!): 6.5 [HB]   2137 Baseline Hgb 8 per heme note in March 2025  [HB]   2259 Attending summary:  24 y/o M with PMHx sickle cell disease who is presenting for 1 day of n/v/d. No abd pain, sick contacts, travel, new foods. Also a few days of lower back pain that feels like prior sickle cell pain crises.  No trauma.  No urinary incontinence or retention, leg numbness or weakness.  No fevers, chills, chest pain, cough, shortness of breath.  Prior to being staffed with me, patient had labs and pain control.  Labs showed hemoglobin of 6.5 and his baseline is usually in the 8s.  Retic count was up from baseline.  LDH and CMP are still pending as the initial were hemolyzed. On exam, well-appearing, unremarkable cardiopulm exam, soft nontender abdomen, no vertebral midline tenderness, neurovasc intact lower extremities. He has no signs or symptoms of acute chest syndrome.  His other counts are similar to prior, do not believe this is aplastic crisis.  He denies any bleeding.  Will plan for admission. [SS]      ED Course User Index  [HB] Preeti Rosario PA-C  [SS] Keya Price MD         Diagnoses as of 06/18/25 1621   Sickle cell crisis (Multi)     Disposition   Admit to hematology for sickle cell crisis     Procedures   Procedures    COMMENT: Please note this report has been produced using speech recognition software and may contain errors related that system including errors in grammar, punctuation, spelling as well as words and phrases that may be inappropriate.  If there are any questions or concerns please feel free to contact the dictating provider for clarification.     Keya Price MD  Emergency Medicine      Preeti Rosario PA-C  06/10/25 5128        Keya Price MD  06/18/25 7956

## 2025-06-11 NOTE — H&P
History Of Present Illness  Yakov Jimenez is a 25 y.o. male with PMHx sickle cell disease and small airway disease who presented to the ED (6/10/25) with nausea and vomiting as well as lower back pain typical of his sickle cell pain crisis. Of note, patient admits to running out of home Oxycodone this week and has noted increased pain as a result. ED workup noting anemia (hgb 6.5) but CXR negative for acute cardiopulmonary process. He is to be admitted for further pain control.      Upon admit, patient endorses N/V/D x 2 days. He also notes a mild headache, which he attributes to his lower hemoglobin. He felt chills at home prior to coming to the ED so he checked his temperature, but no fever noted. He denies visual disturbances, cough, congestion, sore throat, SOB, CP, abd pain, constipation, urinary difficulties, bleeding/bruising, rashes, or feelings of lightheadedness/dizziness.         ED COURSE:   -Vitals: T 36.4, HR 89, RR 16, /65, O2 99%   -Labs: CBC - WBC 4.3, H/H 6.5/17.5. T bili 1.3. .   -Imaging: CXR - No acute cardiopulmonary process.    -Interventions: 0.5mg IV Dilaudid x 1, 4mg IV Zofran x 1, 10mg PO Oxy x 1        Past Medical History  He has a past medical history of History of panretinal photocoagulation (2022) and Sickle cell anemia (Multi).    Surgical History  He has a past surgical history that includes Other surgical history (07/14/2022) and MR angio head wo IV contrast (7/26/2019).    Oncology History    No history exists.        Social History  He reports that he has never smoked. He has never used smokeless tobacco. He reports that he does not drink alcohol and does not use drugs.     Allergies  Ceftriaxone    Review of Systems   Constitutional:  Positive for chills. Negative for appetite change, fatigue and fever.   HENT: Negative.  Negative for congestion, nosebleeds, postnasal drip, rhinorrhea, sore throat and trouble swallowing.    Eyes: Negative.  Negative for visual  disturbance.   Respiratory: Negative.  Negative for cough, chest tightness, shortness of breath and wheezing.    Cardiovascular: Negative.  Negative for chest pain, palpitations and leg swelling.   Gastrointestinal:  Positive for abdominal pain, diarrhea, nausea and vomiting. Negative for constipation.   Endocrine: Negative.    Genitourinary: Negative.    Musculoskeletal:  Positive for arthralgias and back pain.   Skin: Negative.    Allergic/Immunologic: Negative.    Neurological:  Positive for headaches. Negative for dizziness, syncope, weakness, light-headedness and numbness.   Hematological: Negative.    Psychiatric/Behavioral: Negative.          Physical Exam  Constitutional:       General: He is not in acute distress.     Appearance: Normal appearance. He is normal weight. He is not ill-appearing or toxic-appearing.   HENT:      Head: Normocephalic and atraumatic.      Nose: Nose normal.      Mouth/Throat:      Mouth: Mucous membranes are moist.      Pharynx: Oropharynx is clear.   Eyes:      Extraocular Movements: Extraocular movements intact.      Conjunctiva/sclera: Conjunctivae normal.      Pupils: Pupils are equal, round, and reactive to light.   Cardiovascular:      Rate and Rhythm: Normal rate and regular rhythm.      Pulses: Normal pulses.      Heart sounds: Normal heart sounds. No murmur heard.     No friction rub. No gallop.   Pulmonary:      Effort: Pulmonary effort is normal.      Breath sounds: Normal breath sounds. No wheezing, rhonchi or rales.   Abdominal:      General: Abdomen is flat. Bowel sounds are normal.      Palpations: Abdomen is soft.      Tenderness: There is no abdominal tenderness.   Musculoskeletal:      Right lower leg: No edema.      Left lower leg: No edema.   Skin:     General: Skin is warm and dry.   Neurological:      General: No focal deficit present.      Mental Status: He is alert and oriented to person, place, and time. Mental status is at baseline.   Psychiatric:          Mood and Affect: Mood normal.         Behavior: Behavior normal.         Thought Content: Thought content normal.          Last Recorded Vitals  Blood pressure 114/64, pulse 75, temperature 36.4 °C (97.5 °F), resp. rate 10, height 1.829 m (6'), weight 79.4 kg (175 lb), SpO2 100%.    Relevant Results  Scheduled medications  Scheduled Medications[1]  Continuous medications  Continuous Medications[2]  PRN medications  PRN Medications[3]         Assessment/Plan   Assessment & Plan  Sickle cell pain crisis (Multi)    Yakov Jimenez is a 25 y.o. male with PMHx sickle cell disease and small airway disease who presented to the ED (6/10/25) with nausea and vomiting as well as lower back pain typical of his sickle cell pain crisis. Of note, patient admits to running out of home Oxycodone this week and has noted increased pain as a result. ED workup noting anemia (hgb 6.5) but CXR negative for acute cardiopulmonary process. He is to be admitted for further pain control. DC pending improvement.         #HgbSS disease w/ pain crisis    - Follows with Dr. Nelson/Katherine outpatient, very rarely admitted for crisis (last outpatient appt on 3/19/25)   - OARRS reviewed- no aberrant behavior noted. Last filled Oxycodone 10mg tabs (#25) 7 day supply on 5/10/25   - No active careplan available    - Start 0.4mg IV Dilaudid q2h PRN + 10mg Oxycodone q6h PRN on admit as per last admission (6/11-)   - Start 30mg IV Toradol q6h sched with PPI support x 3 days (6/11-planned stop 6/14)    - Hgb 6.5 on admit (BL ~ 9), will hold off on simple transfusion at this time given pt’s stable condition   - CXR (6/10) - No localizing infiltrate. Stigmata of sickle cell disease   - LDH (364) and T. bili (1.3) at BL on admit   - Hgb S pending (6/11)   - Continue home Hydrea 2000mg, folic acid 1mg daily   - Continue Amoxicillin 250mg BID prophylaxis    - Bowel regimen for opioid induced constipation PRN i/s/o diarrhea on admit   - Utox pending (6/11)        #  N/V/D   - Pt reports 2 days of symptoms   - 1 episode of chills, but no fevers   - Flu, Covid pending (6/11)   - Can consider stool studies if diarrhea continues/worsens   - Zofran IV PRN         # Hx Small Airway Disease   - Follows pulm outpatient, last on 1/8/25  - CT chest (1/8) - Stable linear nodularity along R major fissure extending to RML and pleural-based opacity in posterior/posterolateral aspect of LLL, likely foci of scarring  - Continue home Advair (Breo Ellipta inpatient) daily, Albuterol PRN       # PPX: Lovenox, encourage ambulation, Protonix          DISPO:  - FULL CODE   - DC home no needs pending improvement in pain    - NOK: Sulma Warner [phone]   - [facility] 7/30      I spent 75 minutes in the professional and overall care of this patient.      Arianna Soni PA-C       [1] amoxicillin, 250 mg, oral, q12h  enoxaparin, 40 mg, subcutaneous, q24h  fluticasone furoate-vilanteroL, 1 puff, inhalation, Daily  folic acid, 1 mg, oral, Daily  hydroxyurea, 2,000 mg, oral, Daily  ketorolac, 30 mg, intravenous, q6h EMIR  pantoprazole, 40 mg, oral, Daily before breakfast  [2]    [3] PRN medications: albuterol, diphenhydrAMINE, HYDROmorphone, ondansetron, oxyCODONE, polyethylene glycol, sennosides-docusate sodium

## 2025-06-11 NOTE — PROGRESS NOTES
Yakov Jimenez is a 25 y.o. male on day 0 of admission presenting with Sickle cell pain crisis (Multi).    Subjective   Patient reports his pain in his lower back is more controlled with current pain regimen, reporting pain is currently a 3/10 (though 8/10 without pain meds). Notes last episode emesis last PM, now nausea only minimal. Abdominal pain resolved, abdomen soft, nontender to palpation. LBM yesterday, semi-formed. Denies bleeding (melena, hematochezia, hemoptysis, hematuria, epistaxis). Denies HA, dizziness, CP, SOB, constipation. All other ROS otherwise negative.     Objective     Physical Exam  Constitutional:       General: He is not in acute distress.     Appearance: Normal appearance.   HENT:      Head: Normocephalic and atraumatic.      Nose: Nose normal.      Mouth/Throat:      Mouth: Mucous membranes are moist.   Eyes:      General: No scleral icterus.     Extraocular Movements: Extraocular movements intact.      Pupils: Pupils are equal, round, and reactive to light.   Cardiovascular:      Rate and Rhythm: Normal rate and regular rhythm.      Pulses: Normal pulses.      Heart sounds: Normal heart sounds.   Pulmonary:      Effort: Pulmonary effort is normal.      Breath sounds: Normal breath sounds.   Abdominal:      General: Abdomen is flat. Bowel sounds are normal. There is no distension.      Palpations: Abdomen is soft. There is no mass.      Tenderness: There is no abdominal tenderness. There is no guarding.   Musculoskeletal:         General: No swelling, tenderness or deformity. Normal range of motion.      Cervical back: Normal range of motion and neck supple.   Skin:     General: Skin is warm and dry.      Coloration: Skin is not jaundiced.      Findings: No bruising, erythema or rash.   Neurological:      General: No focal deficit present.      Mental Status: He is alert and oriented to person, place, and time.      Cranial Nerves: No cranial nerve deficit.      Sensory: No sensory  deficit.      Motor: No weakness.   Psychiatric:         Mood and Affect: Mood normal.         Behavior: Behavior normal.      Comments: Fluent speech          Last Recorded Vitals  Blood pressure 100/65, pulse 61, temperature 36.4 °C (97.5 °F), resp. rate 18, height 1.829 m (6'), weight 79.4 kg (175 lb), SpO2 98%.  Intake/Output last 3 Shifts:  No intake/output data recorded.    Relevant Results  Results for orders placed or performed during the hospital encounter of 06/10/25 (from the past 24 hours)   CBC with Differential   Result Value Ref Range    WBC 4.3 (L) 4.4 - 11.3 x10*3/uL    nRBC 29.6 (H) 0.0 - 0.0 /100 WBCs    RBC 1.38 (L) 4.50 - 5.90 x10*6/uL    Hemoglobin 6.5 (LL) 13.5 - 17.5 g/dL    Hematocrit 17.5 (L) 41.0 - 52.0 %     (H) 80 - 100 fL    MCH 47.1 (H) 26.0 - 34.0 pg    MCHC 37.1 (H) 32.0 - 36.0 g/dL    RDW 22.5 (H) 11.5 - 14.5 %    Platelets 328 150 - 450 x10*3/uL    Neutrophils % 35.6 40.0 - 80.0 %    Immature Granulocytes %, Automated 0.2 0.0 - 0.9 %    Lymphocytes % 59.3 13.0 - 44.0 %    Monocytes % 4.7 2.0 - 10.0 %    Eosinophils % 0.0 0.0 - 6.0 %    Basophils % 0.2 0.0 - 2.0 %    Neutrophils Absolute 1.51 1.20 - 7.70 x10*3/uL    Immature Granulocytes Absolute, Automated 0.01 0.00 - 0.70 x10*3/uL    Lymphocytes Absolute 2.52 1.20 - 4.80 x10*3/uL    Monocytes Absolute 0.20 0.10 - 1.00 x10*3/uL    Eosinophils Absolute 0.00 0.00 - 0.70 x10*3/uL    Basophils Absolute 0.01 0.00 - 0.10 x10*3/uL   Reticulocyte Count   Result Value Ref Range    Retic % 8.0 (H) 0.5 - 2.0 %    Retic Absolute 0.110 0.022 - 0.118 x10*6/uL    Reticulocyte Hemoglobin 32 28 - 38 pg    Immature Retic fraction 26.9 (H) <=16.0 %   Morphology   Result Value Ref Range    RBC Morphology See Below     Polychromasia Mild     Target Cells Few     Stomatocytes Few     Bill-Jolly Bodies Present     Pappenheimer Bodies Present    Type and Screen   Result Value Ref Range    ABO TYPE O     Rh TYPE POS     ANTIBODY SCREEN NEG    LDH,  Lactate dehydrogenase   Result Value Ref Range     (H) 84 - 246 U/L   Comprehensive metabolic panel   Result Value Ref Range    Glucose 84 74 - 99 mg/dL    Sodium 136 136 - 145 mmol/L    Potassium 4.2 3.5 - 5.3 mmol/L    Chloride 104 98 - 107 mmol/L    Bicarbonate 23 21 - 32 mmol/L    Anion Gap 13 10 - 20 mmol/L    Urea Nitrogen 7 6 - 23 mg/dL    Creatinine 0.68 0.50 - 1.30 mg/dL    eGFR >90 >60 mL/min/1.73m*2    Calcium 9.9 8.6 - 10.6 mg/dL    Albumin 4.9 3.4 - 5.0 g/dL    Alkaline Phosphatase 66 33 - 120 U/L    Total Protein 8.9 (H) 6.4 - 8.2 g/dL    AST 35 9 - 39 U/L    Bilirubin, Total 1.3 (H) 0.0 - 1.2 mg/dL    ALT 36 10 - 52 U/L   CBC and Auto Differential   Result Value Ref Range    WBC 5.2 4.4 - 11.3 x10*3/uL    nRBC 23.9 (H) 0.0 - 0.0 /100 WBCs    RBC 1.24 (L) 4.50 - 5.90 x10*6/uL    Hemoglobin 5.7 (LL) 13.5 - 17.5 g/dL    Hematocrit 15.6 (L) 41.0 - 52.0 %     (H) 80 - 100 fL    MCH 46.0 (H) 26.0 - 34.0 pg    MCHC 36.5 (H) 32.0 - 36.0 g/dL    RDW 19.3 (H) 11.5 - 14.5 %    Platelets 268 150 - 450 x10*3/uL    Immature Granulocytes %, Automated 0.4 0.0 - 0.9 %    Immature Granulocytes Absolute, Automated 0.02 0.00 - 0.70 x10*3/uL   Comprehensive Metabolic Panel   Result Value Ref Range    Glucose 115 (H) 74 - 99 mg/dL    Sodium 135 (L) 136 - 145 mmol/L    Potassium 3.4 (L) 3.5 - 5.3 mmol/L    Chloride 103 98 - 107 mmol/L    Bicarbonate 24 21 - 32 mmol/L    Anion Gap 11 10 - 20 mmol/L    Urea Nitrogen 6 6 - 23 mg/dL    Creatinine 0.81 0.50 - 1.30 mg/dL    eGFR >90 >60 mL/min/1.73m*2    Calcium 9.4 8.6 - 10.6 mg/dL    Albumin 4.4 3.4 - 5.0 g/dL    Alkaline Phosphatase 62 33 - 120 U/L    Total Protein 8.1 6.4 - 8.2 g/dL    AST 25 9 - 39 U/L    Bilirubin, Total 1.1 0.0 - 1.2 mg/dL    ALT 32 10 - 52 U/L   Lactate Dehydrogenase   Result Value Ref Range     (H) 84 - 246 U/L   Reticulocytes   Result Value Ref Range    Retic % 5.6 (H) 0.5 - 2.0 %    Retic Absolute 0.070 0.022 - 0.118 x10*6/uL     Reticulocyte Hemoglobin 44 (H) 28 - 38 pg    Immature Retic fraction 37.5 (H) <=16.0 %   Manual Differential   Result Value Ref Range    Neutrophils %, Manual 34.9 40.0 - 80.0 %    Bands %, Manual 0.0 0.0 - 5.0 %    Lymphocytes %, Manual 60.4 13.0 - 44.0 %    Monocytes %, Manual 1.9 2.0 - 10.0 %    Eosinophils %, Manual 0.0 0.0 - 6.0 %    Basophils %, Manual 1.9 0.0 - 2.0 %    Atypical Lymphocytes %, Manual 0.0 0.0 - 2.0 %    Metamyelocytes %, Manual 0.0 0.0 - 0.0 %    Myelocytes %, Manual 0.0 0.0 - 0.0 %    Plasma Cells %, Manual 0.9 0.00 - 0.00 %    Promyelocytes %, Manual 0.0 0.0 - 0.0 %    Blasts %, Manual 0.0 0.0 - 0.0 %    Seg Neutrophils Absolute, Manual 1.81 1.20 - 7.00 x10*3/uL    Bands Absolute, Manual 0.00 0.00 - 0.70 x10*3/uL    Lymphocytes Absolute, Manual 3.14 1.20 - 4.80 x10*3/uL    Monocytes Absolute, Manual 0.10 0.10 - 1.00 x10*3/uL    Eosinophils Absolute, Manual 0.00 0.00 - 0.70 x10*3/uL    Basophils Absolute, Manual 0.10 0.00 - 0.10 x10*3/uL    Atypical Lymphs Absolute, Manual 0.00 0.00 - 0.50 x10*3/uL    Metamyelocytes Absolute, Manual 0.00 0.00 - 0.00 x10*3/uL    Myelocytes Absolute, Manual 0.00 0.00 - 0.00 x10*3/uL    Plasma Cells Absolute, Manual 0.05 0.00 - 0.00 x10*3/uL    Promyelocytes Absolute, Manual 0.00 0.00 - 0.00 x10*3/uL    Blasts Absolute, Manual 0.00 0.00 - 0.00 x10*3/uL    Total Cells Counted 106     Neutrophils Absolute, Manual 1.81 1.20 - 7.70 x10*3/uL    Manual nRBC per 100 Cells 0.0 0.0 - 0.0 %    RBC Morphology See Below     Polychromasia Marked     Sickle Cells Few     Target Cells Many     Ovalocytes Few     Basophilic Stippling Present    Sars-CoV-2 and Influenza A/B PCR   Result Value Ref Range    Flu A Result Not Detected Not Detected    Flu B Result Not Detected Not Detected    Coronavirus 2019, PCR Not Detected Not Detected   CBC and Auto Differential   Result Value Ref Range    WBC 3.7 (L) 4.4 - 11.3 x10*3/uL    nRBC 26.6 (H) 0.0 - 0.0 /100 WBCs    RBC 0.84 (L) 4.50  - 5.90 x10*6/uL    Hemoglobin 4.0 (LL) 13.5 - 17.5 g/dL    Hematocrit 10.6 (L) 41.0 - 52.0 %     (H) 80 - 100 fL    MCH 47.6 (H) 26.0 - 34.0 pg    MCHC 37.7 (H) 32.0 - 36.0 g/dL    RDW 19.1 (H) 11.5 - 14.5 %    Platelets 240 150 - 450 x10*3/uL    Neutrophils % 25.7 40.0 - 80.0 %    Immature Granulocytes %, Automated 0.3 0.0 - 0.9 %    Lymphocytes % 68.5 13.0 - 44.0 %    Monocytes % 5.2 2.0 - 10.0 %    Eosinophils % 0.3 0.0 - 6.0 %    Basophils % 0.0 0.0 - 2.0 %    Neutrophils Absolute 0.94 (L) 1.20 - 7.70 x10*3/uL    Immature Granulocytes Absolute, Automated 0.01 0.00 - 0.70 x10*3/uL    Lymphocytes Absolute 2.50 1.20 - 4.80 x10*3/uL    Monocytes Absolute 0.19 0.10 - 1.00 x10*3/uL    Eosinophils Absolute 0.01 0.00 - 0.70 x10*3/uL    Basophils Absolute 0.00 0.00 - 0.10 x10*3/uL   Reticulocytes   Result Value Ref Range    Retic % 5.6 (H) 0.5 - 2.0 %    Retic Absolute 0.047 0.022 - 0.118 x10*6/uL    Reticulocyte Hemoglobin 39 (H) 28 - 38 pg    Immature Retic fraction 37.9 (H) <=16.0 %   Morphology   Result Value Ref Range    RBC Morphology See Below     Polychromasia Mild     Target Cells Few     Pappenheimer Bodies Present          Assessment & Plan  Sickle cell pain crisis (Multi)      Yakov Jimenez is a 25 y.o. male with PMHx sickle cell disease and small airway disease who presented to the ED (6/10/25) with nausea and vomiting as well as lower back pain typical of his sickle cell pain crisis. Of note, patient admits to running out of home Oxycodone this week and has noted increased pain as a result. ED workup noting anemia (hgb 6.5) but CXR negative for acute cardiopulmonary process. He is to be admitted for further pain control. DC pending improvement pain and anemia.       #HgbSS disease w/ pain crisis    - Follows with Dr. Nelson/Katherine outpatient, very rarely admitted for crisis (last outpatient appt on 3/19/25)   - OARRS reviewed- no aberrant behavior noted. Last filled Oxycodone 10mg tabs (#25) 7 day  supply on 5/10/25   - No active careplan available    - Start 0.4mg IV Dilaudid q2h PRN + 10mg Oxycodone q6h PRN on admit as per last admission (6/11-)   - Start 30mg IV Toradol q6h sched with PPI support x 3 days (6/11-planned stop 6/14)    - CXR (6/10) - No localizing infiltrate. Stigmata of sickle cell disease   - LDH (364) and T. bili (1.3) at BL on admit   - Hgb S 73.7% (6/10)   - Continue home Hydrea 2000mg, folic acid 1mg daily   - Continue Amoxicillin 250mg BID prophylaxis fir asplenia   - Bowel regimen for opioid induced constipation PRN i/s/o diarrhea on admit   - Utox (6/11)+oxy (as expected)   - 1u PRBC ordered for Hgb 5.7; pt declines evidence bleeding, HDS, no overt s/sx bleeding   -Repeat CBC pending       # N/V/D; improved  - Pt reports 2 days of symptoms   - Flu, Covid neg (6/11)   - Zofran IV PRN   -If any persistent symptoms, can plan further evaluation with stool studies and CT imaging        # Hx Small Airway Disease   - Follows pulm outpatient, last on 1/8/25  - CT chest (1/8) - Stable linear nodularity along R major fissure extending to RML and pleural-based opacity in posterior/posterolateral aspect of LLL, likely foci of scarring  - Continue home Advair (Breo Ellipta inpatient) daily, Albuterol PRN         # PPX: Lovenox, encourage ambulation, Protonix           DISPO:  - FULL CODE   - DC home no needs pending improvement in pain and anemia  - NOK: Sulma Warner [phone]   - [facility] 7/30    Patient discussed with Dr. Palak Jacob, PA-C

## 2025-06-11 NOTE — CARE PLAN
The patient's goals for the shift include      The clinical goals for the shift include pt will remain HDS and VSS through end of shift      Problem: Pain - Adult  Goal: Verbalizes/displays adequate comfort level or baseline comfort level  Outcome: Progressing     Problem: Safety - Adult  Goal: Free from fall injury  Outcome: Progressing     Problem: Discharge Planning  Goal: Discharge to home or other facility with appropriate resources  Outcome: Progressing     Problem: Chronic Conditions and Co-morbidities  Goal: Patient's chronic conditions and co-morbidity symptoms are monitored and maintained or improved  Outcome: Progressing     Problem: Nutrition  Goal: Nutrient intake appropriate for maintaining nutritional needs  Outcome: Progressing

## 2025-06-11 NOTE — PROGRESS NOTES
Pharmacy Medication History Review    Yakov Jimenez is a 25 y.o. male admitted for Sickle cell pain crisis (Multi). Pharmacy reviewed the patient's [id number] medications and allergies for accuracy.    Medications ADDED:  None  Medications CHANGED:  Advair Diskus 250-50 mcg / dose inhaler - patient taking differently: PRN  Naproxen 500 mg tablet - updated sig to 1 tablet po Q12H PRN   Medications REMOVED:   None    The list below reflects the updated PTA list.   Prior to Admission Medications   Prescriptions Last Dose Informant   Advair Diskus 250-50 mcg/dose diskus inhaler More than a month Self   Sig: Inhale 1 puff every 12 hours.   Patient taking differently: Inhale 1 puff every 12 hours if needed.   acetaminophen (Tylenol) 500 mg tablet Past Week Self   Sig: Take 1-2 tablets (500-1,000 mg) by mouth once daily as needed.   albuterol 90 mcg/actuation inhaler More than a month Self   Sig: Inhale 1-2 puffs  EVERY 4 TO 6 HOURS if needed for wheezing..   amoxicillin (Amoxil) 250 mg capsule 6/10/2025 Evening Self   Sig: TAKE 1 CAPSULE BY MOUTH EVERY 12 HOURS   folic acid (Folvite) 1 mg tablet 6/10/2025 Evening Self   Sig: Take 1 tablet (1 mg) by mouth once daily.   hydroxyurea (Hydrea) 500 mg capsule 6/10/2025 Evening Self   Sig: Take 4 capsules (2,000 mg total) by mouth once daily.  Take at the same time each day.   naloxone (Narcan) 4 mg/0.1 mL nasal spray  Self   Sig: INSTILL 1 SPRAY IN ONE NOSTRIL AS NEEDED FOR ACCIDENTAL OPIOID OVERDOSE; REPEAT WITH SECOND DOSE IN 5 MINUTES IF NO RESPONSE.   naproxen (Naprosyn) 500 mg tablet Past Month Self   Sig: Take 1 tablet (500 mg) by mouth 2 times a day as needed for mild pain (1 - 3) or moderate pain (4 - 6).  May take one Naproxen with one tylenol for pain if tylenol alone doesn't work for mild to moderate sickle cell pain   oxyCODONE (Roxicodone) 10 mg immediate release tablet Unknown Self   Sig: Take 1 tablet (10 mg) by mouth every 6 hours if needed for severe  "pain (7 - 10).      Facility-Administered Medications: None        The list below reflects the updated allergy list. Please review each documented allergy for additional clarification and justification.  Allergies  Reviewed by Patricia Connor on 6/11/2025        Severity Reactions Comments    Ceftriaxone High Anaphylaxis             Patient accepts M2B at discharge.     Sources:   OARRS - 05/10/2025 Oxycodone Hcl ( ir ) 10 mg tablet 25#/7 days                    - 04/07/2025 Oxycodone Hcl ( ir ) 10 mg tablet 25 # / 7 days   Patient interview  Epic medication dispense hs report    chart review    admission med rec grid  CE     Additional Comments:  No Fill Hx shown for Naproxen 500 mg tablets     Patient reports taking Advair Diskus 250-50 mcg / dose diskus inhaler only on an PRN basis. This inhaler is shown as ordered on 11/19/2024 , no recent fill hx is shown.     Also no recent fill hx for Albuterol 90 mcg / actuation inhaler. Epic shows this inhaler was last ordered on 10/23/2023    Patient is a reliable historian.       Patricia Connor  Pharmacy Technician  06/11/25     Secure Chat preferred   If no response call [phone] or [employer] \"Med Rec\"   "

## 2025-06-12 VITALS
HEIGHT: 72 IN | WEIGHT: 175 LBS | SYSTOLIC BLOOD PRESSURE: 109 MMHG | BODY MASS INDEX: 23.7 KG/M2 | OXYGEN SATURATION: 98 % | DIASTOLIC BLOOD PRESSURE: 65 MMHG | RESPIRATION RATE: 16 BRPM | TEMPERATURE: 98.8 F | HEART RATE: 93 BPM

## 2025-06-12 PROBLEM — J11.1 FLU: Status: RESOLVED | Noted: 2023-12-27 | Resolved: 2025-06-12

## 2025-06-12 PROBLEM — D57.00 SICKLE CELL PAIN CRISIS (MULTI): Status: RESOLVED | Noted: 2023-12-26 | Resolved: 2025-06-12

## 2025-06-12 LAB
ALBUMIN SERPL BCP-MCNC: 4.4 G/DL (ref 3.4–5)
ALP SERPL-CCNC: 73 U/L (ref 33–120)
ALT SERPL W P-5'-P-CCNC: 28 U/L (ref 10–52)
ANION GAP SERPL CALC-SCNC: 9 MMOL/L (ref 10–20)
AST SERPL W P-5'-P-CCNC: 24 U/L (ref 9–39)
BASOPHILS # BLD AUTO: 0.01 X10*3/UL (ref 0–0.1)
BASOPHILS NFR BLD AUTO: 0.2 %
BILIRUB SERPL-MCNC: 1.1 MG/DL (ref 0–1.2)
BUN SERPL-MCNC: 7 MG/DL (ref 6–23)
CALCIUM SERPL-MCNC: 9.1 MG/DL (ref 8.6–10.6)
CHLORIDE SERPL-SCNC: 106 MMOL/L (ref 98–107)
CO2 SERPL-SCNC: 29 MMOL/L (ref 21–32)
CREAT SERPL-MCNC: 0.77 MG/DL (ref 0.5–1.3)
EGFRCR SERPLBLD CKD-EPI 2021: >90 ML/MIN/1.73M*2
EOSINOPHIL # BLD AUTO: 0.04 X10*3/UL (ref 0–0.7)
EOSINOPHIL NFR BLD AUTO: 0.8 %
ERYTHROCYTE [DISTWIDTH] IN BLOOD BY AUTOMATED COUNT: 27.3 % (ref 11.5–14.5)
FOLATE SERPL-MCNC: 14.1 NG/ML
GLUCOSE SERPL-MCNC: 93 MG/DL (ref 74–99)
HCT VFR BLD AUTO: 20.6 % (ref 41–52)
HGB BLD-MCNC: 7.4 G/DL (ref 13.5–17.5)
HGB RETIC QN: 40 PG (ref 28–38)
IMM GRANULOCYTES # BLD AUTO: 0.01 X10*3/UL (ref 0–0.7)
IMM GRANULOCYTES NFR BLD AUTO: 0.2 % (ref 0–0.9)
IMMATURE RETIC FRACTION: 35.5 %
LDH SERPL L TO P-CCNC: 255 U/L (ref 84–246)
LYMPHOCYTES # BLD AUTO: 3.41 X10*3/UL (ref 1.2–4.8)
LYMPHOCYTES NFR BLD AUTO: 65.7 %
MCH RBC QN AUTO: 43.3 PG (ref 26–34)
MCHC RBC AUTO-ENTMCNC: 35.9 G/DL (ref 32–36)
MCV RBC AUTO: 121 FL (ref 80–100)
MONOCYTES # BLD AUTO: 0.24 X10*3/UL (ref 0.1–1)
MONOCYTES NFR BLD AUTO: 4.6 %
NEUTROPHILS # BLD AUTO: 1.48 X10*3/UL (ref 1.2–7.7)
NEUTROPHILS NFR BLD AUTO: 28.5 %
NRBC BLD-RTO: 48.6 /100 WBCS (ref 0–0)
PATH REVIEW-CBC DIFFERENTIAL: NORMAL
PLATELET # BLD AUTO: 408 X10*3/UL (ref 150–450)
POTASSIUM SERPL-SCNC: 3.6 MMOL/L (ref 3.5–5.3)
PROT SERPL-MCNC: 8.1 G/DL (ref 6.4–8.2)
RBC # BLD AUTO: 1.71 X10*6/UL (ref 4.5–5.9)
RETICS #: 0.1 X10*6/UL (ref 0.02–0.12)
RETICS/RBC NFR AUTO: 5.8 % (ref 0.5–2)
SODIUM SERPL-SCNC: 140 MMOL/L (ref 136–145)
VIT B12 SERPL-MCNC: 344 PG/ML (ref 211–911)
WBC # BLD AUTO: 5.2 X10*3/UL (ref 4.4–11.3)

## 2025-06-12 PROCEDURE — 99239 HOSP IP/OBS DSCHRG MGMT >30: CPT | Performed by: PHYSICIAN ASSISTANT

## 2025-06-12 PROCEDURE — 2500000004 HC RX 250 GENERAL PHARMACY W/ HCPCS (ALT 636 FOR OP/ED): Mod: SE

## 2025-06-12 PROCEDURE — 82607 VITAMIN B-12: CPT

## 2025-06-12 PROCEDURE — 85045 AUTOMATED RETICULOCYTE COUNT: CPT

## 2025-06-12 PROCEDURE — 85025 COMPLETE CBC W/AUTO DIFF WBC: CPT

## 2025-06-12 PROCEDURE — 2500000001 HC RX 250 WO HCPCS SELF ADMINISTERED DRUGS (ALT 637 FOR MEDICARE OP): Mod: SE

## 2025-06-12 PROCEDURE — 36415 COLL VENOUS BLD VENIPUNCTURE: CPT

## 2025-06-12 PROCEDURE — 82746 ASSAY OF FOLIC ACID SERUM: CPT

## 2025-06-12 PROCEDURE — 80053 COMPREHEN METABOLIC PANEL: CPT

## 2025-06-12 PROCEDURE — 2500000004 HC RX 250 GENERAL PHARMACY W/ HCPCS (ALT 636 FOR OP/ED): Mod: SE | Performed by: PHYSICIAN ASSISTANT

## 2025-06-12 PROCEDURE — G0378 HOSPITAL OBSERVATION PER HR: HCPCS

## 2025-06-12 PROCEDURE — 83615 LACTATE (LD) (LDH) ENZYME: CPT

## 2025-06-12 RX ORDER — ONDANSETRON 4 MG/1
4 TABLET, FILM COATED ORAL ONCE
Status: COMPLETED | OUTPATIENT
Start: 2025-06-12 | End: 2025-06-12

## 2025-06-12 RX ADMIN — AMOXICILLIN 250 MG: 250 CAPSULE ORAL at 08:05

## 2025-06-12 RX ADMIN — ONDANSETRON HYDROCHLORIDE 4 MG: 4 TABLET, FILM COATED ORAL at 12:57

## 2025-06-12 RX ADMIN — PANTOPRAZOLE SODIUM 40 MG: 40 TABLET, DELAYED RELEASE ORAL at 06:11

## 2025-06-12 RX ADMIN — FOLIC ACID 1 MG: 1 TABLET ORAL at 08:05

## 2025-06-12 RX ADMIN — HYDROXYUREA 2000 MG: 500 CAPSULE ORAL at 08:05

## 2025-06-12 ASSESSMENT — ACTIVITIES OF DAILY LIVING (ADL): LACK_OF_TRANSPORTATION: NO

## 2025-06-12 ASSESSMENT — PAIN SCALES - GENERAL: PAINLEVEL_OUTOF10: 0 - NO PAIN

## 2025-06-12 NOTE — DISCHARGE SUMMARY
Discharge Diagnosis  Sickle cell pain crisis (Multi)    Issues Requiring Follow-Up  None    Test Results Pending At Discharge  Pending Labs       Order Current Status    Pathologist Review-CBC Differential In process    Hemoglobin Identification with Path Review Preliminary result        Hospital Course  Yakov Jimenez is a 25 y.o. male with PMHx sickle cell disease and small airway disease who presented to the ED (6/10/25) with nausea and vomiting as well as lower back pain typical of his sickle cell pain crisis. Of note, patient admits to running out of home Oxycodone this week and has noted increased pain as a result. ED workup noting anemia (hgb 6.5) but CXR negative for acute cardiopulmonary process. He was transfused with 1 unit of PRBC with good instrumentation in his hgb. Today his pain went away, no more nausea/vomiting or diarrhea and is discharged in stable condition. FUV with Dr. Nelson as heeded.   On the day of discharge, the patient reported feeling well and pain was controlled. Vitals and labs were stable.   Attending has reviewed all labs and vitals, and discussed and agreed with the discharge plan prior to patient discharge.  Patient discharged in stable condition. > 30 minutes spent on discharge planning.    Visit Vitals  /73 (BP Location: Left arm, Patient Position: Lying)   Pulse 86   Temp 37.4 °C (99.3 °F) (Temporal)   Resp 16     Vitals:    06/10/25 1719   Weight: 79.4 kg (175 lb)       Immunization History   Administered Date(s) Administered    COVID-19, mRNA, LNP-S, PF, 30 mcg/0.3 mL dose 03/21/2021, 04/11/2021, 01/11/2022    Flu vaccine (IIV4), preservative free *Check age/dose* 09/24/2018, 09/16/2020, 11/22/2021    Hepatitis B vaccine, 19 yrs and under (RECOMBIVAX, ENGERIX) 09/27/2005    Pneumococcal conjugate vaccine, 13-valent (PREVNAR 13) 10/29/2020, 11/22/2021    Varicella vaccine, subcutaneous (VARIVAX) 08/19/2008       XR chest 2 views  Result Date: 6/10/2025  1. No localizing  infiltrate. Stigmata of sickle cell disease   I personally reviewed the image(s)/study and resident interpretation. I agree with the findings as stated by resident Obinna Streling. Data analyzed and images interpreted at [facility], [place], [place].   MACRO: None   Signed by: Clyde Gibson 6/10/2025 11:56 PM Dictation workstation:   [id number]      Pertinent Physical Exam At Time of Discharge  Physical Exam  General: alert, awake, and well-developed  Skin: warm, dry, intact  Head/Neck: NCAT, supple  Eyes: EOMI, no scleral icterus  Mouth: OMM, no erythema or discharge   Lungs: CTA, no adventitious breath sounds  Cardiovascular: RRR,no m/r/g, no edema  Abdomen: +BS, soft, non-tender, non-distended  Neuro: normal movement, normal speech   Psych: normal affect and mood    Home Medications     Medication List      CHANGE how you take these medications     Advair Diskus 250-50 mcg/dose diskus inhaler; Generic drug: fluticasone   propion-salmeteroL; Inhale 1 puff every 12 hours.; What changed: when to   take this, reasons to take this     CONTINUE taking these medications     acetaminophen 500 mg tablet; Commonly known as: Tylenol   albuterol 90 mcg/actuation inhaler; Inhale 1-2 puffs  EVERY 4 TO 6 HOURS   if needed for wheezing..   amoxicillin 250 mg capsule; Commonly known as: Amoxil; TAKE 1 CAPSULE BY   MOUTH EVERY 12 HOURS   folic acid 1 mg tablet; Commonly known as: Folvite; Take 1 tablet (1 mg)   by mouth once daily.   hydroxyurea 500 mg capsule; Commonly known as: Hydrea; Take 4 capsules   (2,000 mg total) by mouth once daily.  Take at the same time each day.   naloxone 4 mg/0.1 mL nasal spray; Commonly known as: Narcan; INSTILL 1   SPRAY IN ONE NOSTRIL AS NEEDED FOR ACCIDENTAL OPIOID OVERDOSE; REPEAT WITH   SECOND DOSE IN 5 MINUTES IF NO RESPONSE.   naproxen 500 mg tablet; Commonly known as: Naprosyn   oxyCODONE 10 mg immediate release tablet; Commonly known as: Roxicodone;    Take 1 tablet (10 mg) by mouth every 6 hours if needed for severe pain (7   - 10).       Outpatient Follow-Up  Future Appointments   Date Time Provider Department Center   7/30/2025  3:00 PM Ashutosh Burnette OD [id number]       Marvin Tracey PA-C

## 2025-06-12 NOTE — PROGRESS NOTES
06/12/25 1100   Discharge Planning   Living Arrangements Parent   Support Systems Parent;Family members   Assistance Needed none   Type of Residence Private residence   Number of Stairs to Enter Residence 3   Number of Stairs Within Residence 17   Do you have animals or pets at home? Yes   Type of Animals or Pets 2 dogs, 2 cats   Home or Post Acute Services None   Expected Discharge Disposition Home   Financial Resource Strain   How hard is it for you to pay for the very basics like food, housing, medical care, and heating? Not hard   Housing Stability   In the last 12 months, was there a time when you were not able to pay the mortgage or rent on time? N   In the past 12 months, how many times have you moved where you were living? 0   At any time in the past 12 months, were you homeless or living in a shelter (including now)? N   Transportation Needs   In the past 12 months, has lack of transportation kept you from medical appointments or from getting medications? no   In the past 12 months, has lack of transportation kept you from meetings, work, or from getting things needed for daily living? No     Care Transitions Note:    This SW/TCC met with pt to introduce self and role.  Per the medical team, this patient has no anticipated discharge needs; full assessment deferred at this time.  Please advise SW/TCC if discharge planning needs arise.   Elizabeth Gunsalus LISW-S  Care Transitions Supervisor

## 2025-06-12 NOTE — CARE PLAN
The patient's goals for the shift include      The clinical goals for the shift include Pt will remain HDS and VSS through end of shift 6/12/2025 1900      Problem: Pain - Adult  Goal: Verbalizes/displays adequate comfort level or baseline comfort level  Outcome: Progressing     Problem: Safety - Adult  Goal: Free from fall injury  Outcome: Progressing     Problem: Discharge Planning  Goal: Discharge to home or other facility with appropriate resources  Outcome: Progressing     Problem: Chronic Conditions and Co-morbidities  Goal: Patient's chronic conditions and co-morbidity symptoms are monitored and maintained or improved  Outcome: Progressing     Problem: Nutrition  Goal: Nutrient intake appropriate for maintaining nutritional needs  Outcome: Progressing

## 2025-06-12 NOTE — CARE PLAN
The clinical goals for the shift include pt will remain HDS through end of shift, 0700 06/12/2025  Problem: Pain - Adult  Goal: Verbalizes/displays adequate comfort level or baseline comfort level  6/11/2025 2154 by Caitlin Mendoza RN  Outcome: Progressing  6/11/2025 2154 by Caitlin Mendoza RN  Outcome: Progressing     Problem: Safety - Adult  Goal: Free from fall injury  6/11/2025 2154 by Caitlin Mendoza RN  Outcome: Progressing  6/11/2025 2154 by Caitlin Mendoza RN  Outcome: Progressing     Problem: Discharge Planning  Goal: Discharge to home or other facility with appropriate resources  6/11/2025 2154 by Caitlin Mendoza RN  Outcome: Progressing  6/11/2025 2154 by Caitlin Mendoza RN  Outcome: Progressing     Problem: Chronic Conditions and Co-morbidities  Goal: Patient's chronic conditions and co-morbidity symptoms are monitored and maintained or improved  6/11/2025 2154 by Caitlin Mendoza RN  Outcome: Progressing  6/11/2025 2154 by Caitlin Mendoza RN  Outcome: Progressing     Problem: Nutrition  Goal: Nutrient intake appropriate for maintaining nutritional needs  6/11/2025 2154 by Caitlin Mendoza RN  Outcome: Progressing  6/11/2025 2154 by Caitlin Mendoza RN  Outcome: Progressing

## 2025-06-13 LAB
HEMOGLOBIN A2: 4.2 % (ref 2–3.5)
HEMOGLOBIN F: 22.1 % (ref 0–2)
HEMOGLOBIN IDENTIFICATION INTERPRETATION: ABNORMAL
HEMOGLOBIN S: 73.7 %
PATH REVIEW-HGB IDENTIFICATION: ABNORMAL

## 2025-06-18 ENCOUNTER — TELEPHONE (OUTPATIENT)
Dept: HEMATOLOGY/ONCOLOGY | Facility: HOSPITAL | Age: 25
End: 2025-06-18
Payer: MEDICAID

## 2025-06-18 DIAGNOSIS — D57.00 SICKLE CELL CRISIS (MULTI): Primary | ICD-10-CM

## 2025-06-18 NOTE — TELEPHONE ENCOUNTER
Pt calls to request lab orders for a CBC to be drawn to recheck his HgB, which had dropped to 4.0 when he was in the ED one week ago. It was 7.2 after transfusion the same day but he was instructed to have labs rechecked in one week. He will be going to [facility] tomorrow.     Message sent to Sickle Cell team.

## 2025-06-19 ENCOUNTER — LAB (OUTPATIENT)
Dept: LAB | Facility: HOSPITAL | Age: 25
End: 2025-06-19
Payer: MEDICAID

## 2025-06-19 ENCOUNTER — SOCIAL WORK (OUTPATIENT)
Dept: CASE MANAGEMENT | Facility: HOSPITAL | Age: 25
End: 2025-06-19
Payer: MEDICAID

## 2025-06-19 DIAGNOSIS — D57.00 SICKLE CELL CRISIS (MULTI): ICD-10-CM

## 2025-06-19 LAB
BASOPHILS # BLD AUTO: 0.02 X10*3/UL (ref 0–0.1)
BASOPHILS NFR BLD AUTO: 0.5 %
DACRYOCYTES BLD QL SMEAR: NORMAL
EOSINOPHIL # BLD AUTO: 0 X10*3/UL (ref 0–0.7)
EOSINOPHIL NFR BLD AUTO: 0 %
ERYTHROCYTE [DISTWIDTH] IN BLOOD BY AUTOMATED COUNT: ABNORMAL %
HCT VFR BLD AUTO: 21.9 % (ref 41–52)
HGB BLD-MCNC: 7.7 G/DL (ref 13.5–17.5)
HGB RETIC QN: 42 PG (ref 28–38)
HOWELL-JOLLY BOD BLD QL SMEAR: PRESENT
HYPOCHROMIA BLD QL SMEAR: NORMAL
IMM GRANULOCYTES # BLD AUTO: 0 X10*3/UL (ref 0–0.7)
IMM GRANULOCYTES NFR BLD AUTO: 0 % (ref 0–0.9)
IMMATURE RETIC FRACTION: 24.2 %
LDH SERPL L TO P-CCNC: 230 U/L (ref 84–246)
LYMPHOCYTES # BLD AUTO: 2.13 X10*3/UL (ref 1.2–4.8)
LYMPHOCYTES NFR BLD AUTO: 58.5 %
MCH RBC QN AUTO: 43.8 PG (ref 26–34)
MCHC RBC AUTO-ENTMCNC: 35.2 G/DL (ref 32–36)
MCV RBC AUTO: 124 FL (ref 80–100)
MONOCYTES # BLD AUTO: 0.23 X10*3/UL (ref 0.1–1)
MONOCYTES NFR BLD AUTO: 6.3 %
NEUTROPHILS # BLD AUTO: 1.26 X10*3/UL (ref 1.2–7.7)
NEUTROPHILS NFR BLD AUTO: 34.7 %
NRBC BLD-RTO: 29.4 /100 WBCS (ref 0–0)
OVALOCYTES BLD QL SMEAR: NORMAL
PLATELET # BLD AUTO: 228 X10*3/UL (ref 150–450)
POLYCHROMASIA BLD QL SMEAR: NORMAL
RBC # BLD AUTO: 1.76 X10*6/UL (ref 4.5–5.9)
RBC MORPH BLD: NORMAL
RETICS #: 0.09 X10*6/UL (ref 0.02–0.12)
RETICS/RBC NFR AUTO: 5.1 % (ref 0.5–2)
SCHISTOCYTES BLD QL SMEAR: NORMAL
TARGETS BLD QL SMEAR: NORMAL
WBC # BLD AUTO: 3.6 X10*3/UL (ref 4.4–11.3)

## 2025-06-19 PROCEDURE — 83615 LACTATE (LD) (LDH) ENZYME: CPT

## 2025-06-19 PROCEDURE — 85025 COMPLETE CBC W/AUTO DIFF WBC: CPT

## 2025-06-19 PROCEDURE — 85045 AUTOMATED RETICULOCYTE COUNT: CPT

## 2025-06-19 PROCEDURE — 36415 COLL VENOUS BLD VENIPUNCTURE: CPT

## 2025-06-19 NOTE — TELEPHONE ENCOUNTER
Per Dr. Nelson:    Orders placed for CBC, retic and LDH     Will call patient after 8:30am this morning to let him know.

## 2025-06-19 NOTE — PROGRESS NOTES
Transportation Referral     Referral Source: S. Callans  Multiple Rides Needed? No  First Date Transportation is needed? 6/19  Ambulation: Independently  Pick-up Address: [address] [place] [address]  Patient Phone: [phone]   Accompaniment: No  Phone Receives Text Messages? Yes  Transportation Service: Roundtrip ( p:[phone]) , Reason: One way      Scheduled Ride(s):     Date: 6/19/2025   Appointment: Labs   Drop off Address: [address] [place] [address]    Time: 4:25pm   Confirmation: Text message      Pt agreeable to plan. SW will continue to follow as needed.

## 2025-06-19 NOTE — TELEPHONE ENCOUNTER
Called Yakov back to let him know that Dr. Nelson entered his lab orders and that he may have them drawn today. Understanding verbalized with teach back. No further needs at this time.

## 2025-06-20 PROCEDURE — RXMED WILLOW AMBULATORY MEDICATION CHARGE

## 2025-06-24 ENCOUNTER — PHARMACY VISIT (OUTPATIENT)
Dept: PHARMACY | Facility: CLINIC | Age: 25
End: 2025-06-24
Payer: MEDICAID

## 2025-07-08 ENCOUNTER — TELEPHONE (OUTPATIENT)
Dept: HEMATOLOGY/ONCOLOGY | Facility: HOSPITAL | Age: 25
End: 2025-07-08
Payer: MEDICAID

## 2025-07-08 DIAGNOSIS — D57.00 SICKLE CELL DISEASE WITH CRISIS (MULTI): ICD-10-CM

## 2025-07-08 NOTE — TELEPHONE ENCOUNTER
Refill requests received for the following medications:    Oxycodone 10mg  Hydroxyurea 500mg  Amoxicillin 250mg    Preferred pharmacy is [facility].    Message sent to Sickle Cell team.

## 2025-07-09 ENCOUNTER — APPOINTMENT (OUTPATIENT)
Dept: HEMATOLOGY/ONCOLOGY | Facility: HOSPITAL | Age: 25
End: 2025-07-09
Payer: MEDICAID

## 2025-07-09 PROCEDURE — RXMED WILLOW AMBULATORY MEDICATION CHARGE

## 2025-07-09 RX ORDER — HYDROXYUREA 500 MG/1
2000 CAPSULE ORAL DAILY
Qty: 120 CAPSULE | Refills: 3 | Status: SHIPPED | OUTPATIENT
Start: 2025-07-09 | End: 2025-11-06

## 2025-07-09 RX ORDER — AMOXICILLIN 250 MG/1
250 CAPSULE ORAL EVERY 12 HOURS
Qty: 60 CAPSULE | Refills: 11 | Status: SHIPPED | OUTPATIENT
Start: 2025-07-09 | End: 2026-07-09

## 2025-07-09 RX ORDER — OXYCODONE HYDROCHLORIDE 10 MG/1
10 TABLET ORAL EVERY 6 HOURS PRN
Qty: 25 TABLET | Refills: 0 | Status: SHIPPED | OUTPATIENT
Start: 2025-07-09

## 2025-07-10 ENCOUNTER — PHARMACY VISIT (OUTPATIENT)
Dept: PHARMACY | Facility: CLINIC | Age: 25
End: 2025-07-10
Payer: MEDICAID

## 2025-07-10 PROCEDURE — RXMED WILLOW AMBULATORY MEDICATION CHARGE

## 2025-07-11 ENCOUNTER — PHARMACY VISIT (OUTPATIENT)
Dept: PHARMACY | Facility: CLINIC | Age: 25
End: 2025-07-11
Payer: MEDICAID

## 2025-07-14 ENCOUNTER — OFFICE VISIT (OUTPATIENT)
Dept: HEMATOLOGY/ONCOLOGY | Facility: HOSPITAL | Age: 25
End: 2025-07-14
Payer: MEDICAID

## 2025-07-14 ENCOUNTER — LAB (OUTPATIENT)
Dept: LAB | Facility: HOSPITAL | Age: 25
End: 2025-07-14
Payer: MEDICAID

## 2025-07-14 VITALS
RESPIRATION RATE: 16 BRPM | DIASTOLIC BLOOD PRESSURE: 64 MMHG | WEIGHT: 172.3 LBS | OXYGEN SATURATION: 98 % | SYSTOLIC BLOOD PRESSURE: 124 MMHG | TEMPERATURE: 98.2 F | HEIGHT: 72 IN | HEART RATE: 78 BPM | BODY MASS INDEX: 23.34 KG/M2

## 2025-07-14 DIAGNOSIS — D57.00 SICKLE CELL PAIN CRISIS (MULTI): ICD-10-CM

## 2025-07-14 DIAGNOSIS — J45.909 ASTHMA, UNSPECIFIED ASTHMA SEVERITY, UNSPECIFIED WHETHER COMPLICATED, UNSPECIFIED WHETHER PERSISTENT (HHS-HCC): Primary | ICD-10-CM

## 2025-07-14 LAB
ABO GROUP (TYPE) IN BLOOD: NORMAL
ALBUMIN SERPL BCP-MCNC: 4.4 G/DL (ref 3.4–5)
ALP SERPL-CCNC: 81 U/L (ref 33–120)
ALT SERPL W P-5'-P-CCNC: 37 U/L (ref 10–52)
ANION GAP SERPL CALC-SCNC: 9 MMOL/L (ref 10–20)
ANTIBODY SCREEN: NORMAL
AST SERPL W P-5'-P-CCNC: 26 U/L (ref 9–39)
BASOPHILS # BLD AUTO: 0.04 X10*3/UL (ref 0–0.1)
BASOPHILS NFR BLD AUTO: 0.8 %
BILIRUB SERPL-MCNC: 1.6 MG/DL (ref 0–1.2)
BUN SERPL-MCNC: 11 MG/DL (ref 6–23)
CALCIUM SERPL-MCNC: 9.5 MG/DL (ref 8.6–10.3)
CHLORIDE SERPL-SCNC: 103 MMOL/L (ref 98–107)
CO2 SERPL-SCNC: 30 MMOL/L (ref 21–32)
CREAT SERPL-MCNC: 0.77 MG/DL (ref 0.5–1.3)
EGFRCR SERPLBLD CKD-EPI 2021: >90 ML/MIN/1.73M*2
EOSINOPHIL # BLD AUTO: 0.03 X10*3/UL (ref 0–0.7)
EOSINOPHIL NFR BLD AUTO: 0.6 %
ERYTHROCYTE [DISTWIDTH] IN BLOOD BY AUTOMATED COUNT: 23.6 % (ref 11.5–14.5)
FERRITIN SERPL-MCNC: 292 NG/ML (ref 20–300)
GLUCOSE SERPL-MCNC: 86 MG/DL (ref 74–99)
HCT VFR BLD AUTO: 25.2 % (ref 41–52)
HGB BLD-MCNC: 8.9 G/DL (ref 13.5–17.5)
HGB RETIC QN: 43 PG (ref 28–38)
HOWELL-JOLLY BOD BLD QL SMEAR: PRESENT
HYPOCHROMIA BLD QL SMEAR: NORMAL
IMM GRANULOCYTES # BLD AUTO: 0.02 X10*3/UL (ref 0–0.7)
IMM GRANULOCYTES NFR BLD AUTO: 0.4 % (ref 0–0.9)
IMMATURE RETIC FRACTION: 27.1 %
LDH SERPL L TO P-CCNC: 249 U/L (ref 84–246)
LYMPHOCYTES # BLD AUTO: 2.43 X10*3/UL (ref 1.2–4.8)
LYMPHOCYTES NFR BLD AUTO: 46.2 %
MCH RBC QN AUTO: 43.4 PG (ref 26–34)
MCHC RBC AUTO-ENTMCNC: 35.3 G/DL (ref 32–36)
MCV RBC AUTO: 123 FL (ref 80–100)
MONOCYTES # BLD AUTO: 0.2 X10*3/UL (ref 0.1–1)
MONOCYTES NFR BLD AUTO: 3.8 %
NEUTROPHILS # BLD AUTO: 2.54 X10*3/UL (ref 1.2–7.7)
NEUTROPHILS NFR BLD AUTO: 48.2 %
NRBC BLD-RTO: 25.3 /100 WBCS (ref 0–0)
OVALOCYTES BLD QL SMEAR: NORMAL
PAPPENHEIMER BOD BLD QL SMEAR: PRESENT
PLATELET # BLD AUTO: 178 X10*3/UL (ref 150–450)
POLYCHROMASIA BLD QL SMEAR: NORMAL
POTASSIUM SERPL-SCNC: 4.2 MMOL/L (ref 3.5–5.3)
PROT SERPL-MCNC: 8.1 G/DL (ref 6.4–8.2)
RBC # BLD AUTO: 2.05 X10*6/UL (ref 4.5–5.9)
RBC MORPH BLD: NORMAL
RETICS #: 0.12 X10*6/UL (ref 0.02–0.12)
RETICS/RBC NFR AUTO: 5.7 % (ref 0.5–2)
RH FACTOR (ANTIGEN D): NORMAL
SCHISTOCYTES BLD QL SMEAR: NORMAL
SICKLE CELLS BLD QL SMEAR: NORMAL
SODIUM SERPL-SCNC: 138 MMOL/L (ref 136–145)
TARGETS BLD QL SMEAR: NORMAL
WBC # BLD AUTO: 5.3 X10*3/UL (ref 4.4–11.3)

## 2025-07-14 PROCEDURE — 99214 OFFICE O/P EST MOD 30 MIN: CPT | Performed by: NURSE PRACTITIONER

## 2025-07-14 PROCEDURE — 36415 COLL VENOUS BLD VENIPUNCTURE: CPT

## 2025-07-14 PROCEDURE — 85045 AUTOMATED RETICULOCYTE COUNT: CPT

## 2025-07-14 PROCEDURE — 86850 RBC ANTIBODY SCREEN: CPT

## 2025-07-14 PROCEDURE — 3008F BODY MASS INDEX DOCD: CPT | Performed by: NURSE PRACTITIONER

## 2025-07-14 PROCEDURE — 82728 ASSAY OF FERRITIN: CPT

## 2025-07-14 PROCEDURE — 80053 COMPREHEN METABOLIC PANEL: CPT

## 2025-07-14 PROCEDURE — 83615 LACTATE (LD) (LDH) ENZYME: CPT

## 2025-07-14 PROCEDURE — 85025 COMPLETE CBC W/AUTO DIFF WBC: CPT

## 2025-07-14 ASSESSMENT — PAIN SCALES - GENERAL: PAINLEVEL_OUTOF10: 0-NO PAIN

## 2025-07-14 NOTE — PROGRESS NOTES
"Patient ID: Yakov Jimenez is a 25 y.o. male.  Referring Physician: Lillian Jacob PA-C  [id number] [place] [address]  Primary Care Provider: Jose Guadalupe Nelson MD  Visit Type: Follow Up      Subjective  25 year old old black male presents for hospital follow up. He has Sickle Cell Hb SS. He was admitted for 2 days in June 2025 for pain and anemia. He was transfused with 1 unit PRBC. He has oxygen at home but does not use it. He denies smoking. He uses Oxycodone for pain. He denies pain presently. OARRS reviewed. He uses Hydroxyurea as DMT. He takes Folic Acid daily. He denies constipation and priapism. He is unemployed and looking for work and lives with his grandparents. States he has asthma but it is very mild and he uses a rescue inhaler intermittently and rarely uses Advair Diskus.    HPI    Review of Systems - Oncology     Objective   BSA: 2 meters squared  /64 (BP Location: Left arm, Patient Position: Sitting, BP Cuff Size: Adult)   Pulse 78   Temp 36.8 °C (98.2 °F) (Skin)   Resp 16   Ht (S) 1.835 m (6' 0.24\")   Wt 78.2 kg (172 lb 4.8 oz)   SpO2 98%   BMI 23.21 kg/m²      has a past medical history of History of panretinal photocoagulation (2022) and Sickle cell anemia (Multi).   has a past surgical history that includes Other surgical history (07/14/2022) and MR angio head wo IV contrast (7/26/2019).  Family History[1]  Oncology History    No history exists.       Yakov Jimenez  reports that he has never smoked. He has never used smokeless tobacco.  He  reports no history of alcohol use.  He  reports no history of drug use.    Physical Exam  Vitals reviewed.   Constitutional:       Appearance: Normal appearance. He is normal weight.   HENT:      Head: Normocephalic and atraumatic.      Nose: Nose normal.      Mouth/Throat:      Mouth: Mucous membranes are moist.   Eyes:      General: Scleral icterus present.   Cardiovascular:      Rate and Rhythm: Normal rate.   Pulmonary:      Effort: " Pulmonary effort is normal.      Breath sounds: Normal breath sounds.   Abdominal:      General: Abdomen is flat. Bowel sounds are normal.      Palpations: Abdomen is soft.   Musculoskeletal:         General: Normal range of motion.      Cervical back: Normal range of motion and neck supple.   Skin:     General: Skin is warm and dry.      Capillary Refill: Capillary refill takes less than 2 seconds.   Neurological:      General: No focal deficit present.      Mental Status: He is oriented to person, place, and time.   Psychiatric:         Mood and Affect: Mood normal.     WBC   Date/Time Value Ref Range Status   06/19/2025 04:07 PM 3.6 (L) 4.4 - 11.3 x10*3/uL Final   06/12/2025 06:59 AM 5.2 4.4 - 11.3 x10*3/uL Final   06/11/2025 04:33 PM 3.0 (L) 4.4 - 11.3 x10*3/uL Final     nRBC   Date Value Ref Range Status   06/19/2025 29.4 (H) 0.0 - 0.0 /100 WBCs Final   06/12/2025 48.6 (H) 0.0 - 0.0 /100 WBCs Final   06/11/2025 51.8 (H) 0.0 - 0.0 /100 WBCs Final     RBC   Date Value Ref Range Status   06/19/2025 1.76 (L) 4.50 - 5.90 x10*6/uL Final   06/12/2025 1.71 (L) 4.50 - 5.90 x10*6/uL Final   06/11/2025 1.63 (L) 4.50 - 5.90 x10*6/uL Final     Hemoglobin   Date Value Ref Range Status   06/19/2025 7.7 (L) 13.5 - 17.5 g/dL Final   06/12/2025 7.4 (L) 13.5 - 17.5 g/dL Final   06/11/2025 6.9 (L) 13.5 - 17.5 g/dL Final     Hematocrit   Date Value Ref Range Status   06/19/2025 21.9 (L) 41.0 - 52.0 % Final   06/12/2025 20.6 (L) 41.0 - 52.0 % Final   06/11/2025 19.8 (L) 41.0 - 52.0 % Final     MCV   Date/Time Value Ref Range Status   06/19/2025 04:07  (H) 80 - 100 fL Final   06/12/2025 06:59  (H) 80 - 100 fL Final   06/11/2025 04:33  (H) 80 - 100 fL Final     MCH   Date/Time Value Ref Range Status   06/19/2025 04:07 PM 43.8 (H) 26.0 - 34.0 pg Final   06/12/2025 06:59 AM 43.3 (H) 26.0 - 34.0 pg Final   06/11/2025 04:33 PM 42.3 (H) 26.0 - 34.0 pg Final     MCHC   Date/Time Value Ref Range Status   06/19/2025 04:07 PM  "35.2 32.0 - 36.0 g/dL Final   06/12/2025 06:59 AM 35.9 32.0 - 36.0 g/dL Final   06/11/2025 04:33 PM 34.8 32.0 - 36.0 g/dL Final     RDW   Date/Time Value Ref Range Status   06/19/2025 04:07 PM   Final     Comment:     Not Measured   06/12/2025 06:59 AM 27.3 (H) 11.5 - 14.5 % Final   06/11/2025 04:33 PM   Final     Comment:     Not Measured     Platelets   Date/Time Value Ref Range Status   06/19/2025 04:07  150 - 450 x10*3/uL Final   06/12/2025 06:59  150 - 450 x10*3/uL Final   06/11/2025 04:33  150 - 450 x10*3/uL Final     No results found for: \"MPV\"  Neutrophils %   Date/Time Value Ref Range Status   06/19/2025 04:07 PM 34.7 40.0 - 80.0 % Final   06/12/2025 06:59 AM 28.5 40.0 - 80.0 % Final   06/11/2025 05:53 AM 25.7 40.0 - 80.0 % Final     Immature Granulocytes %, Automated   Date/Time Value Ref Range Status   06/19/2025 04:07 PM 0.0 0.0 - 0.9 % Final     Comment:     Immature Granulocyte Count (IG) includes promyelocytes, myelocytes and metamyelocytes but does not include bands. Percent differential counts (%) should be interpreted in the context of the absolute cell counts (cells/UL).   06/12/2025 06:59 AM 0.2 0.0 - 0.9 % Final     Comment:     Immature Granulocyte Count (IG) includes promyelocytes, myelocytes and metamyelocytes but does not include bands. Percent differential counts (%) should be interpreted in the context of the absolute cell counts (cells/UL).   06/11/2025 04:33 PM 0.3 0.0 - 0.9 % Final     Comment:     Immature Granulocyte Count (IG) includes promyelocytes, myelocytes and metamyelocytes but does not include bands. Percent differential counts (%) should be interpreted in the context of the absolute cell counts (cells/UL).     Lymphocytes %, Manual   Date/Time Value Ref Range Status   06/11/2025 04:33 PM 64.3 13.0 - 44.0 % Final   06/11/2025 01:47 PM 65.8 13.0 - 44.0 % Final   06/11/2025 01:04 AM 60.4 13.0 - 44.0 % Final     Lymphocytes %   Date/Time Value Ref Range Status "   06/19/2025 04:07 PM 58.5 13.0 - 44.0 % Final   06/12/2025 06:59 AM 65.7 13.0 - 44.0 % Final   06/11/2025 05:53 AM 68.5 13.0 - 44.0 % Final     Monocytes %, Manual   Date/Time Value Ref Range Status   06/11/2025 04:33 PM 3.5 2.0 - 10.0 % Final   06/11/2025 01:47 PM 6.1 2.0 - 10.0 % Final   06/11/2025 01:04 AM 1.9 2.0 - 10.0 % Final     Monocytes %   Date/Time Value Ref Range Status   06/19/2025 04:07 PM 6.3 2.0 - 10.0 % Final   06/12/2025 06:59 AM 4.6 2.0 - 10.0 % Final   06/11/2025 05:53 AM 5.2 2.0 - 10.0 % Final     Eosinophils %, Manual   Date/Time Value Ref Range Status   06/11/2025 04:33 PM 0.0 0.0 - 6.0 % Final   06/11/2025 01:47 PM 0.0 0.0 - 6.0 % Final   06/11/2025 01:04 AM 0.0 0.0 - 6.0 % Final     Eosinophils %   Date/Time Value Ref Range Status   06/19/2025 04:07 PM 0.0 0.0 - 6.0 % Final   06/12/2025 06:59 AM 0.8 0.0 - 6.0 % Final   06/11/2025 05:53 AM 0.3 0.0 - 6.0 % Final     Basophils %, Manual   Date/Time Value Ref Range Status   06/11/2025 04:33 PM 0.0 0.0 - 2.0 % Final   06/11/2025 01:47 PM 1.8 0.0 - 2.0 % Final   06/11/2025 01:04 AM 1.9 0.0 - 2.0 % Final     Basophils %   Date/Time Value Ref Range Status   06/19/2025 04:07 PM 0.5 0.0 - 2.0 % Final   06/12/2025 06:59 AM 0.2 0.0 - 2.0 % Final   06/11/2025 05:53 AM 0.0 0.0 - 2.0 % Final     Neutrophils Absolute   Date/Time Value Ref Range Status   06/19/2025 04:07 PM 1.26 1.20 - 7.70 x10*3/uL Final     Comment:     Percent differential counts (%) should be interpreted in the context of the absolute cell counts (cells/uL).   06/12/2025 06:59 AM 1.48 1.20 - 7.70 x10*3/uL Final     Comment:     Percent differential counts (%) should be interpreted in the context of the absolute cell counts (cells/uL).   06/11/2025 05:53 AM 0.94 (L) 1.20 - 7.70 x10*3/uL Final     Comment:     Percent differential counts (%) should be interpreted in the context of the absolute cell counts (cells/uL).     Immature Granulocytes Absolute, Automated   Date/Time Value Ref  "Range Status   06/19/2025 04:07 PM 0.00 0.00 - 0.70 x10*3/uL Final   06/12/2025 06:59 AM 0.01 0.00 - 0.70 x10*3/uL Final   06/11/2025 04:33 PM 0.01 0.00 - 0.70 x10*3/uL Final     Lymphocytes Absolute   Date/Time Value Ref Range Status   06/19/2025 04:07 PM 2.13 1.20 - 4.80 x10*3/uL Final   06/12/2025 06:59 AM 3.41 1.20 - 4.80 x10*3/uL Final   06/11/2025 05:53 AM 2.50 1.20 - 4.80 x10*3/uL Final     Monocytes Absolute   Date/Time Value Ref Range Status   06/19/2025 04:07 PM 0.23 0.10 - 1.00 x10*3/uL Final   06/12/2025 06:59 AM 0.24 0.10 - 1.00 x10*3/uL Final   06/11/2025 05:53 AM 0.19 0.10 - 1.00 x10*3/uL Final     Eosinophils Absolute   Date/Time Value Ref Range Status   06/19/2025 04:07 PM 0.00 0.00 - 0.70 x10*3/uL Final   06/12/2025 06:59 AM 0.04 0.00 - 0.70 x10*3/uL Final   06/11/2025 05:53 AM 0.01 0.00 - 0.70 x10*3/uL Final     Eosinophils Absolute, Manual   Date/Time Value Ref Range Status   06/11/2025 04:33 PM 0.00 0.00 - 0.70 x10*3/uL Final   06/11/2025 01:47 PM 0.00 0.00 - 0.70 x10*3/uL Final   06/11/2025 01:04 AM 0.00 0.00 - 0.70 x10*3/uL Final     Basophils Absolute   Date/Time Value Ref Range Status   06/19/2025 04:07 PM 0.02 0.00 - 0.10 x10*3/uL Final   06/12/2025 06:59 AM 0.01 0.00 - 0.10 x10*3/uL Final   06/11/2025 05:53 AM 0.00 0.00 - 0.10 x10*3/uL Final     Basophils Absolute, Manual   Date/Time Value Ref Range Status   06/11/2025 04:33 PM 0.00 0.00 - 0.10 x10*3/uL Final   06/11/2025 01:47 PM 0.06 0.00 - 0.10 x10*3/uL Final   06/11/2025 01:04 AM 0.10 0.00 - 0.10 x10*3/uL Final       No components found for: \"PT\"  aPTT   Date/Time Value Ref Range Status   01/01/2024 08:54 AM 31 27 - 38 seconds Final       Assessment/Plan    3 month follow up  Do blood work today  Continue with Oxycodone 10 mg for pain  Continue taking Hydroxyurea 2,000 mg daily  Continue taking daily Folic Acid  Use home oxygen as needed.                      [1]   Family History  Problem Relation Name Age of Onset    Asthma Mother   "    Glaucoma Neg Hx      Macular degeneration Neg Hx

## 2025-07-17 ENCOUNTER — SOCIAL WORK (OUTPATIENT)
Dept: HEMATOLOGY/ONCOLOGY | Facility: HOSPITAL | Age: 25
End: 2025-07-17
Payer: MEDICAID

## 2025-07-18 NOTE — PROGRESS NOTES
Medical Certificate for Medical Necessity    Date Received: 7/17/2025  Utility Company: [employer] (P: [phone]/F: [phone])  Completed by: Jose Guadalupe Nelson MD   Submitted via Fax: on 07/17/25  Resources Offered: SUYAPA/BRANDEN  Additional Information: n/a    SW will continue to follow as needed.

## 2025-07-30 ENCOUNTER — APPOINTMENT (OUTPATIENT)
Dept: OPHTHALMOLOGY | Facility: CLINIC | Age: 25
End: 2025-07-30
Payer: MEDICAID

## 2025-07-30 DIAGNOSIS — H52.203 MYOPIA OF BOTH EYES WITH ASTIGMATISM: ICD-10-CM

## 2025-07-30 DIAGNOSIS — D57.09 SICKLE CELL DISEASE WITH CRISIS, WITH PROLIFERATIVE SICKLE CELL RETINOPATHY OF BOTH EYES (MULTI): Primary | ICD-10-CM

## 2025-07-30 DIAGNOSIS — H36.823 SICKLE CELL DISEASE WITH CRISIS, WITH PROLIFERATIVE SICKLE CELL RETINOPATHY OF BOTH EYES (MULTI): Primary | ICD-10-CM

## 2025-07-30 DIAGNOSIS — H52.13 MYOPIA OF BOTH EYES WITH ASTIGMATISM: ICD-10-CM

## 2025-07-30 PROCEDURE — 92015 DETERMINE REFRACTIVE STATE: CPT | Performed by: OPTOMETRIST

## 2025-07-30 PROCEDURE — 99214 OFFICE O/P EST MOD 30 MIN: CPT | Performed by: OPTOMETRIST

## 2025-07-30 PROCEDURE — 92250 FUNDUS PHOTOGRAPHY W/I&R: CPT | Performed by: OPTOMETRIST

## 2025-07-30 ASSESSMENT — ENCOUNTER SYMPTOMS
PSYCHIATRIC NEGATIVE: 0
MUSCULOSKELETAL NEGATIVE: 0
GASTROINTESTINAL NEGATIVE: 0
HEMATOLOGIC/LYMPHATIC NEGATIVE: 0
ENDOCRINE NEGATIVE: 0
CARDIOVASCULAR NEGATIVE: 0
ALLERGIC/IMMUNOLOGIC NEGATIVE: 0
CONSTITUTIONAL NEGATIVE: 0
EYES NEGATIVE: 1
NEUROLOGICAL NEGATIVE: 0
RESPIRATORY NEGATIVE: 0

## 2025-07-30 ASSESSMENT — REFRACTION_MANIFEST
OD_SPHERE: -0.75
OD_CYLINDER: -1.50
OD_AXIS: 145
OS_AXIS: 045
OD_CYLINDER: -1.25
OD_AXIS: 145
OS_CYLINDER: -0.50
OS_CYLINDER: -0.50
OD_SPHERE: -0.75
METHOD_AUTOREFRACTION: 1
OS_SPHERE: -0.75
OS_AXIS: 045
OS_SPHERE: -0.75

## 2025-07-30 ASSESSMENT — TONOMETRY
IOP_METHOD: GOLDMANN APPLANATION
OD_IOP_MMHG: 12
OS_IOP_MMHG: 12

## 2025-07-30 ASSESSMENT — REFRACTION_WEARINGRX
OS_AXIS: 035
OS_CYLINDER: -0.75
OS_SPHERE: -0.50
OD_AXIS: 140
OD_SPHERE: -0.50
SPECS_TYPE: DNB
OD_CYLINDER: -1.25

## 2025-07-30 ASSESSMENT — VISUAL ACUITY
OS_SC: 20/40
OD_SC: 20/50
METHOD: SNELLEN - LINEAR

## 2025-07-30 ASSESSMENT — EXTERNAL EXAM - LEFT EYE: OS_EXAM: NORMAL

## 2025-07-30 ASSESSMENT — CUP TO DISC RATIO
OD_RATIO: .50
OS_RATIO: .50

## 2025-07-30 ASSESSMENT — EXTERNAL EXAM - RIGHT EYE: OD_EXAM: NORMAL

## 2025-07-30 NOTE — PROGRESS NOTES
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    Retinal multimodal imaging including photography was completed, and the findings are described in the examination. Round dark multiple scars inferior and temporal retina OD and a focal tuft of old regressed constantin at 10 not active constantin seen. OS mild RPE change and intraretinal microvascular abnormalities (PAUL) at 3 oclock no constantin. No new findings. The patient was asked to return to our clinic or seek out eye care ASAP if new flashes of light or floaters are noted.      A spectacle prescription was dispensed to be used as needed.     The patient was asked to return to our clinic in one year or sooner if ocular or vision changes occur.   1 year manifest refraction (MR) DFE and OPTOS

## 2025-08-06 DIAGNOSIS — D57.00 SICKLE CELL DISEASE WITH CRISIS (MULTI): ICD-10-CM

## 2025-08-06 PROCEDURE — RXMED WILLOW AMBULATORY MEDICATION CHARGE

## 2025-08-07 ENCOUNTER — PHARMACY VISIT (OUTPATIENT)
Dept: PHARMACY | Facility: CLINIC | Age: 25
End: 2025-08-07
Payer: MEDICAID

## 2025-08-07 RX ORDER — OXYCODONE HYDROCHLORIDE 10 MG/1
10 TABLET ORAL EVERY 6 HOURS PRN
Qty: 25 TABLET | Refills: 0 | OUTPATIENT
Start: 2025-08-07

## 2025-08-08 ENCOUNTER — TELEPHONE (OUTPATIENT)
Dept: ADMISSION | Facility: HOSPITAL | Age: 25
End: 2025-08-08
Payer: MEDICAID

## 2025-08-11 DIAGNOSIS — D57.00 SICKLE CELL DISEASE WITH CRISIS (MULTI): ICD-10-CM

## 2025-08-11 PROCEDURE — RXMED WILLOW AMBULATORY MEDICATION CHARGE

## 2025-08-11 RX ORDER — OXYCODONE HYDROCHLORIDE 10 MG/1
10 TABLET ORAL EVERY 6 HOURS PRN
Qty: 25 TABLET | Refills: 0 | Status: SHIPPED | OUTPATIENT
Start: 2025-08-11

## 2025-08-12 ENCOUNTER — PHARMACY VISIT (OUTPATIENT)
Dept: PHARMACY | Facility: CLINIC | Age: 25
End: 2025-08-12
Payer: MEDICAID

## 2026-08-03 ENCOUNTER — APPOINTMENT (OUTPATIENT)
Dept: OPHTHALMOLOGY | Facility: CLINIC | Age: 26
End: 2026-08-03
Payer: MEDICAID